# Patient Record
Sex: FEMALE | Race: WHITE | NOT HISPANIC OR LATINO
[De-identification: names, ages, dates, MRNs, and addresses within clinical notes are randomized per-mention and may not be internally consistent; named-entity substitution may affect disease eponyms.]

---

## 2020-01-29 ENCOUNTER — APPOINTMENT (OUTPATIENT)
Dept: HOME HEALTH SERVICES | Facility: HOME HEALTH | Age: 85
End: 2020-01-29
Payer: MEDICARE

## 2020-01-29 VITALS
HEART RATE: 80 BPM | SYSTOLIC BLOOD PRESSURE: 150 MMHG | TEMPERATURE: 99 F | RESPIRATION RATE: 16 BRPM | DIASTOLIC BLOOD PRESSURE: 90 MMHG | OXYGEN SATURATION: 96 %

## 2020-01-29 DIAGNOSIS — Z82.49 FAMILY HISTORY OF ISCHEMIC HEART DISEASE AND OTHER DISEASES OF THE CIRCULATORY SYSTEM: ICD-10-CM

## 2020-01-29 DIAGNOSIS — Z85.3 PERSONAL HISTORY OF MALIGNANT NEOPLASM OF BREAST: ICD-10-CM

## 2020-01-29 DIAGNOSIS — Z80.1 FAMILY HISTORY OF MALIGNANT NEOPLASM OF TRACHEA, BRONCHUS AND LUNG: ICD-10-CM

## 2020-01-29 PROCEDURE — 99497 ADVNCD CARE PLAN 30 MIN: CPT

## 2020-01-29 PROCEDURE — 99343: CPT | Mod: 25

## 2020-01-29 PROCEDURE — G0506: CPT

## 2020-01-31 ENCOUNTER — NON-APPOINTMENT (OUTPATIENT)
Age: 85
End: 2020-01-31

## 2020-02-15 PROBLEM — Z85.3 HISTORY OF BILATERAL MALIGNANT NEOPLASM OF BREAST IN FEMALE, UNSPECIFIED ESTROGEN RECEPTOR STATUS, UNSPECIFIED SITE OF BREAST: Status: RESOLVED | Noted: 2020-02-15 | Resolved: 2020-02-15

## 2020-02-15 PROBLEM — Z82.49 FAMILY HISTORY OF MYOCARDIAL INFARCTION: Status: ACTIVE | Noted: 2020-02-15

## 2020-02-15 PROBLEM — Z80.1 FAMILY HISTORY OF LUNG CANCER: Status: ACTIVE | Noted: 2020-02-15

## 2020-02-15 PROBLEM — Z85.3 HISTORY OF MALIGNANT NEOPLASM OF BREAST: Status: RESOLVED | Noted: 2020-02-15 | Resolved: 2020-02-15

## 2020-02-15 NOTE — COUNSELING
[Completed Medical Orders for Life-Sustaining Treatment] : completed medical orders for life-sustaining treatment [ - New patient with 2 or more chronic conditions; CCM discussed and patient-centered care plan established] : New patient with 2 or more chronic conditions; CCM discussed and patient-centered care plan established [Full Code] : Code Status: Full Code [Limited] : Treatment Guidelines: Limited [Trial of Bipap] : Intubation: Trial of Bipap [[facility] Date: _____] [facility] Completion/last verification date: [unfilled] [_____] : HCP: [unfilled]

## 2020-02-15 NOTE — PHYSICAL EXAM
[No Acute Distress] : no acute distress [Well Nourished] : well nourished [Well Developed] : well developed [Normal Voice/Communication] : normal voice communication [Normal Sclera/Conjunctiva] : normal sclera/conjunctiva [EOMI] : extra ocular movement intact [Normal Outer Ear/Nose] : the ears and nose were normal in appearance [Normal Oropharynx] : the oropharynx was normal [No JVD] : no jugular venous distention [Supple] : the neck was supple [No LAD] : no lymphadenopathy [No Respiratory Distress] : no respiratory distress [Clear to Auscultation] : lungs were clear to auscultation bilaterally [No Accessory Muscle Use] : no accessory muscle use [Normal Rate] : heart rate was normal  [Regular Rhythm] : with a regular rhythm [Normal S1, S2] : normal S1 and S2 [No Murmurs] : no murmurs heard [No Edema] : there was no peripheral edema [Breast Exam Declined] : patient declined to have breast exam done [Normal Bowel Sounds] : normal bowel sounds [Non Tender] : non-tender [Soft] : abdomen soft [Not Distended] : not distended [Normal Post Cervical Nodes] : no posterior cervical lymphadenopathy [Normal Anterior Cervical Nodes] : no anterior cervical lymphadenopathy [No CVA Tenderness] : no ~M costovertebral angle tenderness [No Spinal Tenderness] : no spinal tenderness [No Clubbing, Cyanosis] : no clubbing  or cyanosis of the fingernails [No Rash] : no rash [Cranial Nerves Intact] : cranial nerves 2-12 were intact [No Motor Deficits] : the motor exam was normal [No Gross Sensory Deficits] : no gross sensory deficits [Oriented x3] : oriented to person, place, and time [Normal Affect] : the affect was normal [Normal Mood] : the mood was normal [de-identified] : has significant kyphoscoliosis.

## 2020-02-15 NOTE — REASON FOR VISIT
[Initial Eval - Existing Diagnosis] : an initial evaluation of an existing diagnosis [Spouse] : spouse [Formal Caregiver] : formal caregiver [Pre-Visit Preparation] : pre-visit preparation was done [Intercurrent Specialty/Sub-specialty Visits] : the patient has no intercurrent specialty/sub-specialty visits [FreeTextEntry1] : kyphoscoliosis

## 2020-02-15 NOTE — HISTORY OF PRESENT ILLNESS
[FreeTextEntry1] : kyphoscoliosis [FreeTextEntry2] : Patient is a 83 yo woman with a history of kyphoscoliosis, and history of localized breast cancer s/p mastectomy, who is being seen today as an initial visit to the house calls program.\par \par Patient has a past medical history of breast cancer, localized.  She has a past surgical history of bilateral mastectomy - she had her R breast removed at 40 yo (s/p abnormal mmamogram); Left removed at 75 yo (s/p abnormal mammogram, pt wanted it removed) - pt is unsure if it was with lymph node removal.  \par \par Family history: mother  of mi at 75; dad  of lung ca at 80.\par Social history: never smoked; never drank alcohol, no drugs.\par [employer] [place]  6 yrs.\par 63 years  - anniversary .\par adopted son, but son is not involved.\par \par eats/drinks - fish and canned vegetable soup, turkey, sandwiches.

## 2020-02-15 NOTE — HEALTH RISK ASSESSMENT
[HRA Reviewed] : Health risk assessment reviewed [Independent] : managing finances [Some assistance needed] : housekeeping [Full assistance needed] : doing laundry [No] : The patient does not have visual impairment [No falls in past year] : Patient reported no falls in the past year [TimeGetUpGo] : 25 [de-identified] : has a walker in case she needs it.  knows to use it.

## 2020-03-18 ENCOUNTER — APPOINTMENT (OUTPATIENT)
Dept: HOME HEALTH SERVICES | Facility: HOME HEALTH | Age: 85
End: 2020-03-18

## 2020-04-07 ENCOUNTER — APPOINTMENT (OUTPATIENT)
Dept: HOME HEALTH SERVICES | Facility: HOME HEALTH | Age: 85
End: 2020-04-07

## 2020-06-09 ENCOUNTER — APPOINTMENT (OUTPATIENT)
Dept: HOME HEALTH SERVICES | Facility: HOME HEALTH | Age: 85
End: 2020-06-09

## 2020-06-23 ENCOUNTER — APPOINTMENT (OUTPATIENT)
Dept: HOME HEALTH SERVICES | Facility: HOME HEALTH | Age: 85
End: 2020-06-23
Payer: MEDICARE

## 2020-06-23 ENCOUNTER — APPOINTMENT (OUTPATIENT)
Dept: HOME HEALTH SERVICES | Facility: HOME HEALTH | Age: 85
End: 2020-06-23

## 2020-06-23 VITALS
HEART RATE: 74 BPM | OXYGEN SATURATION: 97 % | SYSTOLIC BLOOD PRESSURE: 146 MMHG | RESPIRATION RATE: 18 BRPM | DIASTOLIC BLOOD PRESSURE: 62 MMHG

## 2020-06-23 PROCEDURE — 99348 HOME/RES VST EST LOW MDM 30: CPT | Mod: 25

## 2020-06-23 PROCEDURE — 99497 ADVNCD CARE PLAN 30 MIN: CPT

## 2020-06-24 NOTE — HISTORY OF PRESENT ILLNESS
[Patient] : patient [FreeTextEntry1] : kyphoscoliosis [FreeTextEntry2] : Patient is a 85 yo woman with a history of kyphoscoliosis, and history of localized breast cancer s/p mastectomy, who is being seen today for follow-up visit.\par \par re: kyphoscoliosis - pt feels like she is bending over more.  Sometimes develops pain, and tylenol works well with the pain.  Her pain from earlier in April has significantly improved.\par \par No SOB, CP, N/V, or abd pain. \par \par eats/drinks - 2 meals a day, 2 drinks a day (she forces herself to drink).  \par U/o - normal in color, but less in frequency due to drinking less.  Urinary incontinence resolved.\par BM still regular. has 1/day or 1 every other day.\par \par no hematochezia/melena.  no dysuria/hematuria.  \par \par Pt has intermittent itchiness @ bedtime, that she scratches a lot.  It is on her arms and her legs.

## 2020-06-24 NOTE — HEALTH RISK ASSESSMENT
[HRA Reviewed] : Health risk assessment reviewed [Independent] : preparing meals [Some assistance needed] : housekeeping [Full assistance needed] : using transportation [No falls in past year] : Patient reported no falls in the past year [No] : The patient does not have visual impairment [TimeGetUpGo] : 25 [de-identified] : has a walker in case she needs it.  knows to use it.

## 2020-06-24 NOTE — REASON FOR VISIT
[Follow-Up] : a follow-up visit [Formal Caregiver] : formal caregiver [Spouse] : spouse [Pre-Visit Preparation] : pre-visit preparation was done [Intercurrent Specialty/Sub-specialty Visits] : the patient has no intercurrent specialty/sub-specialty visits [FreeTextEntry1] : kyphoscoliosis

## 2020-06-24 NOTE — PHYSICAL EXAM
[Well Developed] : well developed [Well Nourished] : well nourished [No Acute Distress] : no acute distress [Normal Voice/Communication] : normal voice communication [Normal Sclera/Conjunctiva] : normal sclera/conjunctiva [EOMI] : extra ocular movement intact [Normal Outer Ear/Nose] : the ears and nose were normal in appearance [No JVD] : no jugular venous distention [No Respiratory Distress] : no respiratory distress [Clear to Auscultation] : lungs were clear to auscultation bilaterally [Supple] : the neck was supple [Regular Rhythm] : with a regular rhythm [No Accessory Muscle Use] : no accessory muscle use [Normal Rate] : heart rate was normal  [No Murmurs] : no murmurs heard [Normal S1, S2] : normal S1 and S2 [No Edema] : there was no peripheral edema [Normal Bowel Sounds] : normal bowel sounds [Breast Exam Declined] : patient declined to have breast exam done [Soft] : abdomen soft [Non Tender] : non-tender [Not Distended] : not distended [No CVA Tenderness] : no ~M costovertebral angle tenderness [No Clubbing, Cyanosis] : no clubbing  or cyanosis of the fingernails [No Spinal Tenderness] : no spinal tenderness [No Gross Sensory Deficits] : no gross sensory deficits [No Motor Deficits] : the motor exam was normal [Cranial Nerves Intact] : cranial nerves 2-12 were intact [Oriented x3] : oriented to person, place, and time [Normal Mood] : the mood was normal [Normal Affect] : the affect was normal [de-identified] : has small red rashes - urticarial in appearance - that are about 5 mm in diameter interspersed on her b/l arms and her b/l thighs. [de-identified] : has significant kyphoscoliosis.

## 2020-06-24 NOTE — COUNSELING
[Completed Medical Orders for Life-Sustaining Treatment] : completed medical orders for life-sustaining treatment [DNR] : Code Status: DNR [Annual Discussion and review of: ___] : Annual discussion and review of [unfilled] [Limited] : Treatment Guidelines: Limited [DNI] : Intubation: DNI [[facility]: _____] [facility] Com[facility]/last verification date: [unfilled]

## 2020-08-04 ENCOUNTER — APPOINTMENT (OUTPATIENT)
Dept: HOME HEALTH SERVICES | Facility: HOME HEALTH | Age: 85
End: 2020-08-04

## 2020-09-15 ENCOUNTER — APPOINTMENT (OUTPATIENT)
Dept: HOME HEALTH SERVICES | Facility: HOME HEALTH | Age: 85
End: 2020-09-15
Payer: MEDICARE

## 2020-09-15 VITALS
OXYGEN SATURATION: 99 % | SYSTOLIC BLOOD PRESSURE: 130 MMHG | DIASTOLIC BLOOD PRESSURE: 86 MMHG | TEMPERATURE: 98.9 F | HEART RATE: 74 BPM | RESPIRATION RATE: 18 BRPM

## 2020-09-15 PROCEDURE — 99349 HOME/RES VST EST MOD MDM 40: CPT

## 2020-09-15 NOTE — COUNSELING
[Annual Discussion and review of: ___] : Annual discussion and review of [unfilled] [Completed Medical Orders for Life-Sustaining Treatment] : completed medical orders for life-sustaining treatment [DNR] : Code Status: DNR [Limited] : Treatment Guidelines: Limited [DNI] : Intubation: DNI [[facility]: _____] [facility]/last verification date: [unfilled]

## 2020-09-22 NOTE — PHYSICAL EXAM
[No Acute Distress] : no acute distress [Well Nourished] : well nourished [Well Developed] : well developed [Normal Voice/Communication] : normal voice communication [Normal Sclera/Conjunctiva] : normal sclera/conjunctiva [EOMI] : extra ocular movement intact [Normal Outer Ear/Nose] : the ears and nose were normal in appearance [No JVD] : no jugular venous distention [Supple] : the neck was supple [No Respiratory Distress] : no respiratory distress [Clear to Auscultation] : lungs were clear to auscultation bilaterally [No Accessory Muscle Use] : no accessory muscle use [Normal Rate] : heart rate was normal  [Regular Rhythm] : with a regular rhythm [Normal S1, S2] : normal S1 and S2 [No Murmurs] : no murmurs heard [No Edema] : there was no peripheral edema [Breast Exam Declined] : patient declined to have breast exam done [Normal Bowel Sounds] : normal bowel sounds [Non Tender] : non-tender [Soft] : abdomen soft [Not Distended] : not distended [No CVA Tenderness] : no ~M costovertebral angle tenderness [No Spinal Tenderness] : no spinal tenderness [No Clubbing, Cyanosis] : no clubbing  or cyanosis of the fingernails [Cranial Nerves Intact] : cranial nerves 2-12 were intact [No Motor Deficits] : the motor exam was normal [No Gross Sensory Deficits] : no gross sensory deficits [Oriented x3] : oriented to person, place, and time [Normal Affect] : the affect was normal [Normal Mood] : the mood was normal [de-identified] : severe kyphoscoliosis [de-identified] : has significant kyphoscoliosis. [de-identified] : near end of exam, pt pointed out a patch of a rash near the acute angle of her scoliosis on her R side which was mild-moderately painful.  The rash is dermatomal in nature on the R side of her back traveling to R side.  Also, b/l LEs show a yellowish plaque like rash on her bilateral shins, not painful.

## 2020-09-22 NOTE — HISTORY OF PRESENT ILLNESS
[Patient] : patient [FreeTextEntry1] : kyphoscoliosis [FreeTextEntry2] : Patient denies fever, cough, trouble breathing, rash, vomiting, diarrhea. Patient has not been in close contact with someone who is COVID positive.\par N95 mask, gloves, eye wear and gown (if indicated) used during visit: Y. \par Total face to face time with patient is 40 min.\par \par Patient is a 84 yo woman with a history of kyphoscoliosis, and history of localized breast cancer s/p mastectomy, who is being seen today for follow-up visit.\par \par re: kyphoscoliosis - pt feels like she is bending over more.  Sometimes develops pain, and tylenol works well with the pain.  Her pain from earlier in April has significantly improved.  (her neck and upper back hurts due to the kyphoscoliosis)\par \par No SOB, CP, N/V, or abd pain. \par \par eats/drinks - 2 meals a day, 2 drinks a day (she forces herself to drink).  eats canned soups\par U/o - normal in color, but less in frequency due to drinking less.  Urinary incontinence resolved.\par BM still regular. has 1/day or 1 every other day.\par \par used to have sinus headaches.  \par vision stable.\par \par no hematochezia/melena.  no dysuria/hematuria.  \par \par has sores on legs - needed to get  for bugs.  rashes on legs itch terribly, but overall getting better.

## 2020-09-22 NOTE — HEALTH RISK ASSESSMENT
[HRA Reviewed] : Health risk assessment reviewed [Independent] : managing finances [Some assistance needed] : housekeeping [Full assistance needed] : using transportation [No falls in past year] : Patient reported no falls in the past year [No] : The patient does not have visual impairment [TimeGetUpGo] : 25 [de-identified] : has a walker in case she needs it.  knows to use it.

## 2020-09-22 NOTE — REASON FOR VISIT
[Follow-Up] : a follow-up visit [Spouse] : spouse [Formal Caregiver] : formal caregiver [Pre-Visit Preparation] : pre-visit preparation was done [Intercurrent Specialty/Sub-specialty Visits] : the patient has no intercurrent specialty/sub-specialty visits [FreeTextEntry1] : kyphoscoliosis

## 2020-09-27 ENCOUNTER — TRANSCRIPTION ENCOUNTER (OUTPATIENT)
Age: 85
End: 2020-09-27

## 2020-10-27 ENCOUNTER — APPOINTMENT (OUTPATIENT)
Dept: HOME HEALTH SERVICES | Facility: HOME HEALTH | Age: 85
End: 2020-10-27

## 2020-12-10 ENCOUNTER — APPOINTMENT (OUTPATIENT)
Dept: HOME HEALTH SERVICES | Facility: HOME HEALTH | Age: 85
End: 2020-12-10

## 2021-01-14 ENCOUNTER — APPOINTMENT (OUTPATIENT)
Dept: HOME HEALTH SERVICES | Facility: HOME HEALTH | Age: 86
End: 2021-01-14

## 2021-01-15 RX ORDER — CAPSAICIN 0.1 G/100G
0.1 CREAM TOPICAL
Qty: 1 | Refills: 3 | Status: DISCONTINUED | COMMUNITY
Start: 2020-09-18 | End: 2021-01-15

## 2021-01-15 RX ORDER — VALACYCLOVIR 1 G/1
1 TABLET, FILM COATED ORAL 3 TIMES DAILY
Qty: 21 | Refills: 0 | Status: DISCONTINUED | COMMUNITY
Start: 2020-09-18 | End: 2021-01-15

## 2021-01-21 ENCOUNTER — NON-APPOINTMENT (OUTPATIENT)
Age: 86
End: 2021-01-21

## 2021-02-25 ENCOUNTER — APPOINTMENT (OUTPATIENT)
Dept: HOME HEALTH SERVICES | Facility: HOME HEALTH | Age: 86
End: 2021-02-25

## 2021-03-18 ENCOUNTER — NON-APPOINTMENT (OUTPATIENT)
Age: 86
End: 2021-03-18

## 2021-03-30 ENCOUNTER — NON-APPOINTMENT (OUTPATIENT)
Age: 86
End: 2021-03-30

## 2021-04-28 ENCOUNTER — NON-APPOINTMENT (OUTPATIENT)
Age: 86
End: 2021-04-28

## 2021-04-29 ENCOUNTER — APPOINTMENT (OUTPATIENT)
Dept: HOME HEALTH SERVICES | Facility: HOME HEALTH | Age: 86
End: 2021-04-29
Payer: MEDICARE

## 2021-04-29 VITALS
SYSTOLIC BLOOD PRESSURE: 162 MMHG | OXYGEN SATURATION: 97 % | TEMPERATURE: 98.6 F | HEART RATE: 75 BPM | RESPIRATION RATE: 18 BRPM | DIASTOLIC BLOOD PRESSURE: 78 MMHG

## 2021-04-29 DIAGNOSIS — Z86.19 PERSONAL HISTORY OF OTHER INFECTIOUS AND PARASITIC DISEASES: ICD-10-CM

## 2021-04-29 PROCEDURE — 99497 ADVNCD CARE PLAN 30 MIN: CPT

## 2021-04-29 PROCEDURE — 99349 HOME/RES VST EST MOD MDM 40: CPT | Mod: 25

## 2021-05-10 ENCOUNTER — APPOINTMENT (OUTPATIENT)
Dept: HOME HEALTH SERVICES | Facility: HOME HEALTH | Age: 86
End: 2021-05-10

## 2021-05-29 ENCOUNTER — TRANSCRIPTION ENCOUNTER (OUTPATIENT)
Age: 86
End: 2021-05-29

## 2021-05-29 ENCOUNTER — NON-APPOINTMENT (OUTPATIENT)
Age: 86
End: 2021-05-29

## 2021-06-01 ENCOUNTER — NON-APPOINTMENT (OUTPATIENT)
Age: 86
End: 2021-06-01

## 2021-06-02 ENCOUNTER — TRANSCRIPTION ENCOUNTER (OUTPATIENT)
Age: 86
End: 2021-06-02

## 2021-06-04 ENCOUNTER — TRANSCRIPTION ENCOUNTER (OUTPATIENT)
Age: 86
End: 2021-06-04

## 2021-06-04 ENCOUNTER — NON-APPOINTMENT (OUTPATIENT)
Age: 86
End: 2021-06-04

## 2021-06-04 ENCOUNTER — APPOINTMENT (OUTPATIENT)
Dept: HOME HEALTH SERVICES | Facility: HOME HEALTH | Age: 86
End: 2021-06-04
Payer: MEDICARE

## 2021-06-04 VITALS
OXYGEN SATURATION: 96 % | HEART RATE: 88 BPM | RESPIRATION RATE: 16 BRPM | DIASTOLIC BLOOD PRESSURE: 72 MMHG | TEMPERATURE: 98.8 F | SYSTOLIC BLOOD PRESSURE: 144 MMHG

## 2021-06-04 PROCEDURE — 99348 HOME/RES VST EST LOW MDM 30: CPT | Mod: 25

## 2021-06-04 PROCEDURE — 69210 REMOVE IMPACTED EAR WAX UNI: CPT

## 2021-06-10 ENCOUNTER — APPOINTMENT (OUTPATIENT)
Dept: HOME HEALTH SERVICES | Facility: HOME HEALTH | Age: 86
End: 2021-06-10

## 2021-06-12 NOTE — COUNSELING
[Advanced Directives discussed: ____] : Advanced directives discussed: [unfilled] [Annual Discussion and review of: ___] : Annual discussion and review of [unfilled] [Full Code] : Code Status: Full Code [Limited] : Treatment Guidelines: Limited [Trial of Bipap] : Intubation: Trial of Bipap [[facility]: _____[facility]/last verification date: [unfilled] [_____] : HCP: [unfilled]

## 2021-06-12 NOTE — HEALTH RISK ASSESSMENT
[HRA Reviewed] : Health risk assessment reviewed [Some assistance needed] : bathing [Independent] : preparing meals [Full assistance needed] : managing finances [No falls in past year] : Patient reported no falls in the past year [Yes] : The patient does have visual impairment [FreeTextEntry1] : has shower chair; can do front, but needs help with back. [TimeGetUpGo] : 15

## 2021-06-12 NOTE — HISTORY OF PRESENT ILLNESS
[Patient] : patient [FreeTextEntry1] : kyphoscoliosis [FreeTextEntry2] : Patient denies fever, cough, trouble breathing, rash, vomiting, diarrhea. Patient has not been in close contact with someone who is COVID positive.\par N95 mask, gloves, eye wear and gown (if indicated) used during visit: Y. \par Total face to face time with patient is 40 min.\par \par Patient is a 87 yo woman with a history of kyphoscoliosis, and history of localized breast cancer s/p mastectomy, who is being seen today for follow-up visit.\par \par re: kyphoscoliosis - pt feels like she is bending over more.  Sometimes develops pain, and tylenol works well with the pain.  (her neck and upper back hurts due to the kyphoscoliosis).  "I want it fixed"  discussed this in detail, but pt doesn't want to leave home and doesn't want to see specialist.  discussed the chronic nature of this.  \par per  - runs to the couch less than before.  "yeah, but now it's getting worse!" which led to disagreements between her and her .\par Pt revisited her recliner: she didn't want recliner in the past (see january) but wanted a new one, but an adjustable one.  Advised that she needs to purchase one on her own.\par \par No SOB, CP, N/V, or abd pain. \par \par eats/drinks - 2 meals a day, 2 drinks a day (she forces herself to drink).  eats canned soups\par U/o - normal in color, but less in frequency due to drinking less.  Urinary incontinence resolved.\par BM still regular. has 1/day or 1 every other day.\par \par vision stable.  glares.  ? cataracts.\par \par sleeps - 7 pm - 7am (about 8 hrs straight).\par \par no hematochezia/melena.  no dysuria/hematuria.  \par \par has sores on legs - needed to get  for bugs.  rashes on legs improved, but intermittently itches - however, overall getting better.  washes, but puts no cream on them.

## 2021-06-14 NOTE — COUNSELING
[Advanced Directives discussed: ____] : Advanced directives discussed: [unfilled] [Annual Discussion and review of: ___] : Annual discussion and review of [unfilled] [Full Code] : Code Status: Full Code [Limited] : Treatment Guidelines: Limited [Trial of Bipap] : Intubation: Trial of Bipap [Last Ver[facility] Date: _____] [facility] Completion/last verification date: [unfilled] [_____] : HCP: [unfilled]

## 2021-06-27 NOTE — HISTORY OF PRESENT ILLNESS
[Patient] : patient [FreeTextEntry1] : kyphoscoliosis [FreeTextEntry2] : Patient is a 85 yo woman with a history of kyphoscoliosis, and history of localized breast cancer s/p mastectomy, who is being seen today for acute visit to clean ears. \par \par Patient states she is having trouble hearing - feels her ears are clogged. She is usually able to hear her  when he is yelling to her on the couch. \par \par No SOB, CP, N/V, or abd pain. \par \par Patient denies fever, cough, trouble breathing, rash, vomiting and diarrhea. Patient has not been in close contact with someone covid positive. \par N95 mask, gloves, eye wear and gown used during visit: Y. Total face to face time with patient is 25min.\par \par

## 2021-06-27 NOTE — PHYSICAL EXAM
[No Acute Distress] : no acute distress [Well Nourished] : well nourished [Well Developed] : well developed [Normal Voice/Communication] : normal voice communication [Normal Sclera/Conjunctiva] : normal sclera/conjunctiva [EOMI] : extra ocular movement intact [Normal Outer Ear/Nose] : the ears and nose were normal in appearance [No JVD] : no jugular venous distention [Supple] : the neck was supple [No Respiratory Distress] : no respiratory distress [Clear to Auscultation] : lungs were clear to auscultation bilaterally [No Accessory Muscle Use] : no accessory muscle use [Normal Rate] : heart rate was normal  [Regular Rhythm] : with a regular rhythm [Normal S1, S2] : normal S1 and S2 [No Murmurs] : no murmurs heard [No Edema] : there was no peripheral edema [Breast Exam Declined] : patient declined to have breast exam done [Normal Bowel Sounds] : normal bowel sounds [Non Tender] : non-tender [Soft] : abdomen soft [Not Distended] : not distended [No CVA Tenderness] : no ~M costovertebral angle tenderness [No Spinal Tenderness] : no spinal tenderness [No Clubbing, Cyanosis] : no clubbing  or cyanosis of the fingernails [Cranial Nerves Intact] : cranial nerves 2-12 were intact [No Motor Deficits] : the motor exam was normal [No Gross Sensory Deficits] : no gross sensory deficits [Oriented x3] : oriented to person, place, and time [Normal Affect] : the affect was normal [Normal Mood] : the mood was normal [de-identified] : severe kyphoscoliosis [de-identified] : bilateral cerumen impaction [de-identified] : has severe kyphoscoliosis. [de-identified] : b/l LEs show a yellowish plaque like rash on her bilateral shins, not painful.  Pt had rashes in her legs before but not at this exam.

## 2021-06-27 NOTE — ASSESSMENT
[FreeTextEntry1] : ACP - pt would want to die at home with hospice.  wants resuscitation and NIPPV attempted but not prolonged.\par even IVF would not be long term just short term fixes.\par if something could be fixed in the hospital, she'd want to go there.  if incurable status or can't be fixed in the hospital, then would want to stay home.

## 2021-07-28 ENCOUNTER — NON-APPOINTMENT (OUTPATIENT)
Age: 86
End: 2021-07-28

## 2021-07-30 ENCOUNTER — APPOINTMENT (OUTPATIENT)
Dept: HOME HEALTH SERVICES | Facility: HOME HEALTH | Age: 86
End: 2021-07-30
Payer: MEDICARE

## 2021-07-30 VITALS
RESPIRATION RATE: 16 BRPM | SYSTOLIC BLOOD PRESSURE: 122 MMHG | TEMPERATURE: 97.4 F | OXYGEN SATURATION: 97 % | DIASTOLIC BLOOD PRESSURE: 82 MMHG | HEART RATE: 70 BPM

## 2021-07-30 PROCEDURE — 99349 HOME/RES VST EST MOD MDM 40: CPT

## 2021-09-03 ENCOUNTER — EMERGENCY (EMERGENCY)
Facility: HOSPITAL | Age: 86
LOS: 1 days | Discharge: HOME | End: 2021-09-03
Attending: EMERGENCY MEDICINE | Admitting: EMERGENCY MEDICINE
Payer: MEDICARE

## 2021-09-03 ENCOUNTER — TRANSCRIPTION ENCOUNTER (OUTPATIENT)
Age: 86
End: 2021-09-03

## 2021-09-03 VITALS
SYSTOLIC BLOOD PRESSURE: 186 MMHG | TEMPERATURE: 98 F | WEIGHT: 100.09 LBS | HEART RATE: 74 BPM | RESPIRATION RATE: 16 BRPM | HEIGHT: 60 IN | OXYGEN SATURATION: 98 % | DIASTOLIC BLOOD PRESSURE: 91 MMHG

## 2021-09-03 VITALS
RESPIRATION RATE: 16 BRPM | SYSTOLIC BLOOD PRESSURE: 174 MMHG | HEART RATE: 80 BPM | OXYGEN SATURATION: 97 % | DIASTOLIC BLOOD PRESSURE: 88 MMHG | TEMPERATURE: 98 F

## 2021-09-03 PROCEDURE — 72125 CT NECK SPINE W/O DYE: CPT | Mod: 26,MH

## 2021-09-03 PROCEDURE — 99284 EMERGENCY DEPT VISIT MOD MDM: CPT

## 2021-09-03 PROCEDURE — 70450 CT HEAD/BRAIN W/O DYE: CPT | Mod: 26,MH

## 2021-09-03 PROCEDURE — 70450 CT HEAD/BRAIN W/O DYE: CPT

## 2021-09-03 PROCEDURE — 99284 EMERGENCY DEPT VISIT MOD MDM: CPT | Mod: 25

## 2021-09-03 PROCEDURE — 72125 CT NECK SPINE W/O DYE: CPT

## 2021-09-03 NOTE — ED ADULT NURSE NOTE - OBJECTIVE STATEMENT
Patient brought in by ambulance from home as reported fell from standing complaining of upper rib pain has history of scoliosis no on blood thinner waiting for MD evaluation. Patient noted to have dried scab to mid sacral area and right lower leg.

## 2021-09-03 NOTE — ED PROVIDER NOTE - CARE PROVIDER_API CALL
Jose Croft  ORTHOPAEDIC SURGERY  [facility], [address] [place] [address]  Phone: [phone]  Fax: [phone]  Follow Up Time:

## 2021-09-03 NOTE — ED PROVIDER NOTE - OBJECTIVE STATEMENT
85 yo white female trip mechanical non syncopal trip and fall short while ago at home here now via EMS for evaluation of mild headache and lower posterior neck pain. No LOC. No weakness. No paraesthesia. No chest/abdominal/back or hip pains. Has been feeling well recently.

## 2021-09-03 NOTE — ED PROVIDER NOTE - NSFOLLOWUPINSTRUCTIONS_ED_ALL_ED_FT
Rest  Take MOTRIN or TYLENOL for pain if needed  Follow-up with your doctor this week  Return here if needed

## 2021-09-03 NOTE — ED CLERICAL - NS ED CLERK NOTE PRE-ARRIVAL INFORMATION; ADDITIONAL PRE-ARRIVAL INFORMATION

## 2021-09-03 NOTE — ED PROVIDER NOTE - CONSTITUTIONAL, MLM
normal... Well appearing, thin elderly white female, awake, alert, oriented to person, place, time/situation and in no apparent distress.

## 2021-09-03 NOTE — ED PROVIDER NOTE - PATIENT PORTAL LINK FT
You can access the FollowMyHealth Patient Portal offered by [facility] by registering at the following website: [email or web address]. By joining [facility]’s FollowMyHealth portal, you will also be able to view your health information using other applications (apps) compatible with our system.

## 2021-09-03 NOTE — ED ADULT NURSE NOTE - MODE OF DISCHARGE
Mother reports that patient has an ear infection but is on an antibiotic, patient has not had a fever in 2 days, notified Dr Rebeca Oliveira and he advised if patient only has ear infection he should be OK for procedure, mother confirmed her understanding.
Ambulatory

## 2021-09-03 NOTE — ED ADULT NURSE REASSESSMENT NOTE - NS ED NURSE REASSESS COMMENT FT1
Received pt alert pt is in stretcher. Pt denies SOB abd pain and chest pain. Call bell within reach. Freq rounding performed. Will continue to monitor. Safety/Comfort maintained. Pt went to CT scan.

## 2021-09-03 NOTE — ED ADULT TRIAGE NOTE - CHIEF COMPLAINT QUOTE
Patient brought in by ambulance from home as reported had mechanical fall from standing complaining of right rib pain on Aspirin

## 2021-09-05 ENCOUNTER — TRANSCRIPTION ENCOUNTER (OUTPATIENT)
Age: 86
End: 2021-09-05

## 2021-09-05 ENCOUNTER — NON-APPOINTMENT (OUTPATIENT)
Age: 86
End: 2021-09-05

## 2021-09-05 ENCOUNTER — APPOINTMENT (OUTPATIENT)
Dept: HOME HEALTH SERVICES | Facility: HOME HEALTH | Age: 86
End: 2021-09-05

## 2021-09-05 ENCOUNTER — APPOINTMENT (OUTPATIENT)
Dept: HOME HEALTH SERVICES | Facility: HOME HEALTH | Age: 86
End: 2021-09-05
Payer: MEDICARE

## 2021-09-05 DIAGNOSIS — R73.9 HYPERGLYCEMIA, UNSPECIFIED: ICD-10-CM

## 2021-09-05 DIAGNOSIS — I49.8 OTHER SPECIFIED CARDIAC ARRHYTHMIAS: ICD-10-CM

## 2021-09-05 PROCEDURE — 99349 HOME/RES VST EST MOD MDM 40: CPT | Mod: 95

## 2021-09-07 ENCOUNTER — FORM ENCOUNTER (OUTPATIENT)
Age: 86
End: 2021-09-07

## 2021-09-07 PROBLEM — I10 ESSENTIAL (PRIMARY) HYPERTENSION: Chronic | Status: ACTIVE | Noted: 2021-09-03

## 2021-09-09 LAB
ALBUMIN SERPL ELPH-MCNC: 3.9 G/DL
ALP BLD-CCNC: 77 U/L
ALT SERPL-CCNC: 11 U/L
ANION GAP SERPL CALC-SCNC: 10 MMOL/L
AST SERPL-CCNC: 17 U/L
BASOPHILS # BLD AUTO: 0.03 K/UL
BASOPHILS NFR BLD AUTO: 0.4 %
BILIRUB SERPL-MCNC: 0.3 MG/DL
BUN SERPL-MCNC: 23 MG/DL
CALCIUM SERPL-MCNC: 9 MG/DL
CHLORIDE SERPL-SCNC: 104 MMOL/L
CO2 SERPL-SCNC: 29 MMOL/L
CREAT SERPL-MCNC: 0.48 MG/DL
EOSINOPHIL # BLD AUTO: 0.2 K/UL
EOSINOPHIL NFR BLD AUTO: 2.5 %
ESTIMATED AVERAGE GLUCOSE: 114 MG/DL
GLUCOSE SERPL-MCNC: 213 MG/DL
HBA1C MFR BLD HPLC: 5.6 %
HCT VFR BLD CALC: 35.5 %
HGB BLD-MCNC: 11.5 G/DL
IMM GRANULOCYTES NFR BLD AUTO: 0.4 %
LYMPHOCYTES # BLD AUTO: 1.12 K/UL
LYMPHOCYTES NFR BLD AUTO: 14.3 %
MAN DIFF?: NORMAL
MCHC RBC-ENTMCNC: 32.4 GM/DL
MCHC RBC-ENTMCNC: 34.6 PG
MCV RBC AUTO: 106.9 FL
MONOCYTES # BLD AUTO: 0.45 K/UL
MONOCYTES NFR BLD AUTO: 5.7 %
NEUTROPHILS # BLD AUTO: 6.02 K/UL
NEUTROPHILS NFR BLD AUTO: 76.7 %
PLATELET # BLD AUTO: 205 K/UL
POTASSIUM SERPL-SCNC: 3.5 MMOL/L
PROT SERPL-MCNC: 6.4 G/DL
RBC # BLD: 3.32 M/UL
RBC # FLD: 12.4 %
SODIUM SERPL-SCNC: 143 MMOL/L
WBC # FLD AUTO: 7.85 K/UL

## 2021-09-10 ENCOUNTER — NON-APPOINTMENT (OUTPATIENT)
Age: 86
End: 2021-09-10

## 2021-09-13 ENCOUNTER — APPOINTMENT (OUTPATIENT)
Dept: HOME HEALTH SERVICES | Facility: HOME HEALTH | Age: 86
End: 2021-09-13

## 2021-09-13 NOTE — HISTORY OF PRESENT ILLNESS
[FreeTextEntry2] : Patient denies fever, cough, trouble breathing, rash, vomiting, diarrhea. Patient has not been in close contact with someone who is COVID positive.\par N95 mask, gloves, eye wear and gown (if indicated) used during visit: Y. \par Total face to face time with patient is 40 min.\par \par Patient is a 87 yo woman with a history of kyphoscoliosis, and history of localized breast cancer s/p mastectomy, who is being seen today for follow-up visit.\par \par scoliosis is still an area of concern.\par feels better except for that.  pain is better, and only intermittent.\par sleeps supine in bed.\par no trouble breathing, no CP.  no insomnia.\par doesn't take any pain kills.\par no falls\par appetite is good \par urinating and BM - goes regularly, no issues.\par \par scoliosis - unsure if getting worse or same.\par before would sleep in couch, now sleeps in bed.\par sometimes gets pain - shoulder and hips.\par \par sleeps - 7 pm - 7am (about 8 hrs straight).\par \par no hematochezia/melena. no dysuria/hematuria. \par \par has sores on legs - needed to get  for bugs. rashes on legs improved, but intermittently itches - however, overall getting better. washes, but puts no cream on them. \par

## 2021-09-17 PROBLEM — R73.9 BLOOD GLUCOSE ELEVATED: Status: ACTIVE | Noted: 2021-09-05

## 2021-09-17 PROBLEM — I49.8 SINUS ARRHYTHMIA: Status: ACTIVE | Noted: 2021-09-05

## 2021-10-01 ENCOUNTER — TRANSCRIPTION ENCOUNTER (OUTPATIENT)
Age: 86
End: 2021-10-01

## 2021-10-01 ENCOUNTER — NON-APPOINTMENT (OUTPATIENT)
Age: 86
End: 2021-10-01

## 2021-10-11 ENCOUNTER — TRANSCRIPTION ENCOUNTER (OUTPATIENT)
Age: 86
End: 2021-10-11

## 2021-10-13 ENCOUNTER — NON-APPOINTMENT (OUTPATIENT)
Age: 86
End: 2021-10-13

## 2021-10-13 DIAGNOSIS — K11.7 DISTURBANCES OF SALIVARY SECRETION: ICD-10-CM

## 2021-10-16 ENCOUNTER — NON-APPOINTMENT (OUTPATIENT)
Age: 86
End: 2021-10-16

## 2021-10-19 ENCOUNTER — NON-APPOINTMENT (OUTPATIENT)
Age: 86
End: 2021-10-19

## 2021-10-20 ENCOUNTER — NON-APPOINTMENT (OUTPATIENT)
Age: 86
End: 2021-10-20

## 2021-10-21 ENCOUNTER — APPOINTMENT (OUTPATIENT)
Dept: HOME HEALTH SERVICES | Facility: HOME HEALTH | Age: 86
End: 2021-10-21
Payer: MEDICARE

## 2021-10-21 VITALS
SYSTOLIC BLOOD PRESSURE: 110 MMHG | TEMPERATURE: 98.4 F | OXYGEN SATURATION: 100 % | DIASTOLIC BLOOD PRESSURE: 70 MMHG | RESPIRATION RATE: 18 BRPM | HEART RATE: 98 BPM

## 2021-10-21 DIAGNOSIS — Z23 ENCOUNTER FOR IMMUNIZATION: ICD-10-CM

## 2021-10-21 DIAGNOSIS — Z20.822 CONTACT WITH AND (SUSPECTED) EXPOSURE TO COVID-19: ICD-10-CM

## 2021-10-21 DIAGNOSIS — M54.9 DORSALGIA, UNSPECIFIED: ICD-10-CM

## 2021-10-21 PROCEDURE — 99349 HOME/RES VST EST MOD MDM 40: CPT

## 2021-11-09 PROBLEM — M54.9 ACUTE BACK PAIN: Status: ACTIVE | Noted: 2021-11-09

## 2021-11-09 PROBLEM — Z20.822 SUSPECTED COVID-19 VIRUS INFECTION: Status: RESOLVED | Noted: 2021-10-13 | Resolved: 2021-11-09

## 2021-11-09 PROBLEM — Z23 NEED FOR INFLUENZA VACCINATION: Status: ACTIVE | Noted: 2020-09-22

## 2021-11-09 NOTE — HEALTH RISK ASSESSMENT
[HRA Reviewed] : Health risk assessment reviewed [Independent] : using telephone [Some assistance needed] : managing medications [Full assistance needed] : managing finances [Any fall with injury in past year] : Patient reported fall with injury in the past year [Yes] : The patient does have visual impairment [TimeGetUpGo] : 20 [de-identified] : needs assistance with walker and holding on to walls.

## 2021-11-09 NOTE — PHYSICAL EXAM
[No Acute Distress] : no acute distress [Well Nourished] : well nourished [Well Developed] : well developed [Normal Voice/Communication] : normal voice communication [Normal Sclera/Conjunctiva] : normal sclera/conjunctiva [EOMI] : extra ocular movement intact [Normal Outer Ear/Nose] : the ears and nose were normal in appearance [No JVD] : no jugular venous distention [Supple] : the neck was supple [No Respiratory Distress] : no respiratory distress [Clear to Auscultation] : lungs were clear to auscultation bilaterally [No Accessory Muscle Use] : no accessory muscle use [Normal Rate] : heart rate was normal  [Regular Rhythm] : with a regular rhythm [Normal S1, S2] : normal S1 and S2 [No Murmurs] : no murmurs heard [No Edema] : there was no peripheral edema [Breast Exam Declined] : patient declined to have breast exam done [Normal Bowel Sounds] : normal bowel sounds [Non Tender] : non-tender [Soft] : abdomen soft [Not Distended] : not distended [No CVA Tenderness] : no ~M costovertebral angle tenderness [No Spinal Tenderness] : no spinal tenderness [No Clubbing, Cyanosis] : no clubbing  or cyanosis of the fingernails [Cranial Nerves Intact] : cranial nerves 2-12 were intact [No Motor Deficits] : the motor exam was normal [No Gross Sensory Deficits] : no gross sensory deficits [Oriented x3] : oriented to person, place, and time [Normal Affect] : the affect was normal [Normal Mood] : the mood was normal [de-identified] : severe kyphoscoliosis [de-identified] : has severe kyphoscoliosis. [de-identified] : has keratotic horn of RLE that's pruritic.

## 2021-11-09 NOTE — HISTORY OF PRESENT ILLNESS
[FreeTextEntry2] : Patient denies fever, cough, trouble breathing, rash, vomiting, diarrhea. Patient has not been in close contact with someone who is COVID positive.\par N95 mask, gloves, eye wear and gown (if indicated) used during visit: Y. \par Total face to face time with patient is 40 min.\par \par Patient is a 85 yo woman with a history of kyphoscoliosis, and history of localized breast cancer s/p mastectomy, who is being seen today for follow-up visit.\par \par pt doesn't feel well.  fell and now has pain in R side.\par was doing well with hospital bed x 2-3 days, but now hurts\par \par scoliosis is still an area of concern.  feels better except for that.  pain is better, and only intermittent.\par sleeps supine in bed.\par no trouble breathing, no CP.  no insomnia.\par doesn't take any pain meds.\par appetite is good \par \par urinating and BM - goes regularly, no issues.\par no hematochezia/melena. no dysuria/hematuria.

## 2021-11-09 NOTE — COMPREHENSIVE ASSESSMENT
[Comprehensive Assessment Reviewed] : Comprehensive assessment reviewed [No Action Needed] : No action needed High Dose Vitamin A Pregnancy And Lactation Text: High dose vitamin A therapy is contraindicated during pregnancy and breast feeding.

## 2021-11-09 NOTE — COUNSELING
[Full Code] : Code Status: Full Code [Limited] : Treatment Guidelines: Limited [Trial of Bipap] : Intubation: Trial of Bipap [Last Verification Date: _____] : [facility] Completion/last verification date: [unfilled]

## 2021-11-29 ENCOUNTER — APPOINTMENT (OUTPATIENT)
Dept: HOME HEALTH SERVICES | Facility: HOME HEALTH | Age: 86
End: 2021-11-29

## 2022-01-18 ENCOUNTER — NON-APPOINTMENT (OUTPATIENT)
Age: 87
End: 2022-01-18

## 2022-01-19 ENCOUNTER — APPOINTMENT (OUTPATIENT)
Dept: HOME HEALTH SERVICES | Facility: HOME HEALTH | Age: 87
End: 2022-01-19
Payer: MEDICARE

## 2022-01-19 VITALS
OXYGEN SATURATION: 97 % | RESPIRATION RATE: 18 BRPM | TEMPERATURE: 98.6 F | DIASTOLIC BLOOD PRESSURE: 70 MMHG | SYSTOLIC BLOOD PRESSURE: 140 MMHG | HEART RATE: 84 BPM

## 2022-01-19 DIAGNOSIS — L60.2 ONYCHOGRYPHOSIS: ICD-10-CM

## 2022-01-19 PROCEDURE — 99349 HOME/RES VST EST MOD MDM 40: CPT

## 2022-02-23 ENCOUNTER — APPOINTMENT (OUTPATIENT)
Dept: HOME HEALTH SERVICES | Facility: HOME HEALTH | Age: 87
End: 2022-02-23

## 2022-03-14 NOTE — ASSESSMENT
-- DO NOT REPLY / DO NOT REPLY ALL --  -- Message is from the [facility]--    General Patient Message      Reason for Call: Patient would like a call back regarding the call she received today.    Caller Information       Type Contact Phone    03/14/2022 03:30 PM CDT Phone (Incoming) Sakina Bacon (Self) [phone] (M)          Alternative phone number: NONE    Turnaround time given to caller:   \"This message will be sent to [state Provider's name]. The clinical team will fulfill your request as soon as they review your message.\"     [FreeTextEntry1] : ACP - pt would want to die at home with hospice.  wants resuscitation and NIPPV attempted but not prolonged.\par even IVF would not be long term just short term fixes.\par if something could be fixed in the hospital, she'd want to go there.  if incurable status or can't be fixed in the hospital, then would want to stay home.

## 2022-03-15 PROBLEM — L60.2 ONYCHOGRYPHOSIS: Status: ACTIVE | Noted: 2022-03-15

## 2022-03-15 NOTE — COUNSELING
[Full Code] : Code Status: Full Code [Limited] : Treatment Guidelines: Limited [Trial of Bipap] : Intubation: Trial of Bipap [[facility] Date: _____] [facility] Completion/last verification date: [unfilled]

## 2022-03-15 NOTE — PHYSICAL EXAM
[No Acute Distress] : no acute distress [Well Nourished] : well nourished [Well Developed] : well developed [Normal Voice/Communication] : normal voice communication [Normal Sclera/Conjunctiva] : normal sclera/conjunctiva [EOMI] : extra ocular movement intact [Normal Outer Ear/Nose] : the ears and nose were normal in appearance [No JVD] : no jugular venous distention [Supple] : the neck was supple [No Respiratory Distress] : no respiratory distress [Clear to Auscultation] : lungs were clear to auscultation bilaterally [No Accessory Muscle Use] : no accessory muscle use [Normal Rate] : heart rate was normal  [Regular Rhythm] : with a regular rhythm [Normal S1, S2] : normal S1 and S2 [No Murmurs] : no murmurs heard [No Edema] : there was no peripheral edema [Breast Exam Declined] : patient declined to have breast exam done [Normal Bowel Sounds] : normal bowel sounds [Non Tender] : non-tender [Soft] : abdomen soft [Not Distended] : not distended [No CVA Tenderness] : no ~M costovertebral angle tenderness [No Spinal Tenderness] : no spinal tenderness [No Clubbing, Cyanosis] : no clubbing  or cyanosis of the fingernails [Cranial Nerves Intact] : cranial nerves 2-12 were intact [No Motor Deficits] : the motor exam was normal [No Gross Sensory Deficits] : no gross sensory deficits [Oriented x3] : oriented to person, place, and time [Normal Mood] : the mood was normal [Normal Affect] : the affect was normal [de-identified] : severe kyphoscoliosis [de-identified] : has severe kyphoscoliosis. [de-identified] : legs - onychogryphosis b/l all toenails; R leg has an open sore on shin.  sore comes and goes.

## 2022-03-15 NOTE — HISTORY OF PRESENT ILLNESS
[FreeTextEntry2] : Patient denies fever, cough, trouble breathing, rash, vomiting, diarrhea. Patient has not been in close contact with someone who is COVID positive.\par N95 mask, gloves, eye wear and gown (if indicated) used during visit: Y. \par Total face to face time with patient is 40 min.\par \par Patient is a 87 yo woman with a history of kyphoscoliosis, and history of localized breast cancer s/p mastectomy, who is being seen today for follow-up visit.\par \par updates: \par same old scoliosis problem. feels better except for that.  pain is better, and only intermittent.\par sleeps supine in bed, and sleeps well.\par sore on R leg\par \par eating and drinking - ok\par \par adls - assistance with toilet (commode) and showering and dressing\par no falls since last visit\par \par R leg has an open sore on shin.  sore comes and goes.  no neosporin in house, so use A+D that they have.\par \par no trouble breathing, no CP.  no insomnia.\par doesn't take any pain meds.\par appetite is good \par \par urinating and BM - goes regularly, no issues.\par no hematochezia/melena. no dysuria/hematuria.

## 2022-03-15 NOTE — HEALTH RISK ASSESSMENT
[HRA Reviewed] : Health risk assessment reviewed [Independent] : using telephone [Some assistance needed] : managing medications [Full assistance needed] : managing finances [Any fall with injury in past year] : Patient reported fall with injury in the past year [Yes] : The patient does have visual impairment [TimeGetUpGo] : 20 [de-identified] : needs assistance with walker and holding on to walls.

## 2022-03-19 ENCOUNTER — NON-APPOINTMENT (OUTPATIENT)
Age: 87
End: 2022-03-19

## 2022-03-19 LAB
ALBUMIN SERPL ELPH-MCNC: 4.3 G/DL
ANION GAP SERPL CALC-SCNC: 11 MMOL/L
BASOPHILS # BLD AUTO: 0.04 K/UL
BASOPHILS NFR BLD AUTO: 0.6 %
BUN SERPL-MCNC: 21 MG/DL
CALCIUM SERPL-MCNC: 9.7 MG/DL
CHLORIDE SERPL-SCNC: 102 MMOL/L
CO2 SERPL-SCNC: 30 MMOL/L
CREAT SERPL-MCNC: 0.5 MG/DL
EGFR: 91 ML/MIN/1.73M2
EOSINOPHIL # BLD AUTO: 0.08 K/UL
EOSINOPHIL NFR BLD AUTO: 1.2 %
ESTIMATED AVERAGE GLUCOSE: 111 MG/DL
GLUCOSE SERPL-MCNC: 101 MG/DL
HBA1C MFR BLD HPLC: 5.5 %
HCT VFR BLD CALC: 41.1 %
HGB BLD-MCNC: 13 G/DL
IMM GRANULOCYTES NFR BLD AUTO: 0.4 %
LYMPHOCYTES # BLD AUTO: 1.09 K/UL
LYMPHOCYTES NFR BLD AUTO: 16 %
MAN DIFF?: NORMAL
MCHC RBC-ENTMCNC: 31.6 GM/DL
MCHC RBC-ENTMCNC: 34.9 PG
MCV RBC AUTO: 110.2 FL
MONOCYTES # BLD AUTO: 0.52 K/UL
MONOCYTES NFR BLD AUTO: 7.6 %
NEUTROPHILS # BLD AUTO: 5.07 K/UL
NEUTROPHILS NFR BLD AUTO: 74.2 %
PHOSPHATE SERPL-MCNC: 3.7 MG/DL
PLATELET # BLD AUTO: 224 K/UL
POTASSIUM SERPL-SCNC: 4.4 MMOL/L
RBC # BLD: 3.73 M/UL
RBC # FLD: 11.7 %
SODIUM SERPL-SCNC: 144 MMOL/L
TSH SERPL-ACNC: 1.01 UIU/ML
WBC # FLD AUTO: 6.83 K/UL

## 2022-05-04 ENCOUNTER — APPOINTMENT (OUTPATIENT)
Dept: HOME HEALTH SERVICES | Facility: HOME HEALTH | Age: 87
End: 2022-05-04
Payer: MEDICARE

## 2022-05-04 VITALS
DIASTOLIC BLOOD PRESSURE: 72 MMHG | HEART RATE: 84 BPM | OXYGEN SATURATION: 98 % | RESPIRATION RATE: 16 BRPM | SYSTOLIC BLOOD PRESSURE: 128 MMHG | TEMPERATURE: 98.2 F

## 2022-05-04 DIAGNOSIS — L28.2 OTHER PRURIGO: ICD-10-CM

## 2022-05-04 PROCEDURE — 99348 HOME/RES VST EST LOW MDM 30: CPT

## 2022-05-05 ENCOUNTER — EMERGENCY (EMERGENCY)
Facility: HOSPITAL | Age: 87
LOS: 1 days | Discharge: HOME | End: 2022-05-05
Attending: EMERGENCY MEDICINE | Admitting: EMERGENCY MEDICINE
Payer: MEDICARE

## 2022-05-05 ENCOUNTER — TRANSCRIPTION ENCOUNTER (OUTPATIENT)
Age: 87
End: 2022-05-05

## 2022-05-05 VITALS
TEMPERATURE: 97 F | RESPIRATION RATE: 16 BRPM | HEIGHT: 60 IN | HEART RATE: 72 BPM | DIASTOLIC BLOOD PRESSURE: 88 MMHG | SYSTOLIC BLOOD PRESSURE: 160 MMHG | WEIGHT: 110.01 LBS | OXYGEN SATURATION: 97 %

## 2022-05-05 VITALS
OXYGEN SATURATION: 100 % | DIASTOLIC BLOOD PRESSURE: 80 MMHG | HEART RATE: 80 BPM | TEMPERATURE: 98 F | SYSTOLIC BLOOD PRESSURE: 138 MMHG | RESPIRATION RATE: 18 BRPM

## 2022-05-05 LAB
ALBUMIN SERPL ELPH-MCNC: 3.6 G/DL — SIGNIFICANT CHANGE UP (ref 3.3–5)
ALP SERPL-CCNC: 82 U/L — SIGNIFICANT CHANGE UP (ref 40–120)
ALT FLD-CCNC: 20 U/L — SIGNIFICANT CHANGE UP (ref 12–78)
ANION GAP SERPL CALC-SCNC: 6 MMOL/L — SIGNIFICANT CHANGE UP (ref 5–17)
APPEARANCE UR: CLEAR — SIGNIFICANT CHANGE UP
AST SERPL-CCNC: 18 U/L — SIGNIFICANT CHANGE UP (ref 15–37)
BASOPHILS # BLD AUTO: 0.05 K/UL — SIGNIFICANT CHANGE UP (ref 0–0.2)
BASOPHILS NFR BLD AUTO: 0.3 % — SIGNIFICANT CHANGE UP (ref 0–2)
BILIRUB SERPL-MCNC: 0.5 MG/DL — SIGNIFICANT CHANGE UP (ref 0.2–1.2)
BILIRUB UR-MCNC: NEGATIVE — SIGNIFICANT CHANGE UP
BUN SERPL-MCNC: 18 MG/DL — SIGNIFICANT CHANGE UP (ref 7–23)
CALCIUM SERPL-MCNC: 9.4 MG/DL — SIGNIFICANT CHANGE UP (ref 8.5–10.1)
CHLORIDE SERPL-SCNC: 105 MMOL/L — SIGNIFICANT CHANGE UP (ref 96–108)
CO2 SERPL-SCNC: 31 MMOL/L — SIGNIFICANT CHANGE UP (ref 22–31)
COLOR SPEC: SIGNIFICANT CHANGE UP
CREAT SERPL-MCNC: 0.48 MG/DL — LOW (ref 0.5–1.3)
DIFF PNL FLD: ABNORMAL
EGFR: 92 ML/MIN/1.73M2 — SIGNIFICANT CHANGE UP
EOSINOPHIL # BLD AUTO: 0.01 K/UL — SIGNIFICANT CHANGE UP (ref 0–0.5)
EOSINOPHIL NFR BLD AUTO: 0.1 % — SIGNIFICANT CHANGE UP (ref 0–6)
GLUCOSE SERPL-MCNC: 147 MG/DL — HIGH (ref 70–99)
GLUCOSE UR QL: NEGATIVE — SIGNIFICANT CHANGE UP
HCT VFR BLD CALC: 40.9 % — SIGNIFICANT CHANGE UP (ref 34.5–45)
HGB BLD-MCNC: 13.9 G/DL — SIGNIFICANT CHANGE UP (ref 11.5–15.5)
IMM GRANULOCYTES NFR BLD AUTO: 0.4 % — SIGNIFICANT CHANGE UP (ref 0–1.5)
KETONES UR-MCNC: ABNORMAL
LEUKOCYTE ESTERASE UR-ACNC: NEGATIVE — SIGNIFICANT CHANGE UP
LYMPHOCYTES # BLD AUTO: 0.6 K/UL — LOW (ref 1–3.3)
LYMPHOCYTES # BLD AUTO: 3.8 % — LOW (ref 13–44)
MCHC RBC-ENTMCNC: 34 GM/DL — SIGNIFICANT CHANGE UP (ref 32–36)
MCHC RBC-ENTMCNC: 34.8 PG — HIGH (ref 27–34)
MCV RBC AUTO: 102.3 FL — HIGH (ref 80–100)
MONOCYTES # BLD AUTO: 0.53 K/UL — SIGNIFICANT CHANGE UP (ref 0–0.9)
MONOCYTES NFR BLD AUTO: 3.3 % — SIGNIFICANT CHANGE UP (ref 2–14)
NEUTROPHILS # BLD AUTO: 14.61 K/UL — HIGH (ref 1.8–7.4)
NEUTROPHILS NFR BLD AUTO: 92.1 % — HIGH (ref 43–77)
NITRITE UR-MCNC: NEGATIVE — SIGNIFICANT CHANGE UP
NRBC # BLD: 0 /100 WBCS — SIGNIFICANT CHANGE UP (ref 0–0)
PH UR: 6.5 — SIGNIFICANT CHANGE UP (ref 5–8)
PLATELET # BLD AUTO: 258 K/UL — SIGNIFICANT CHANGE UP (ref 150–400)
POTASSIUM SERPL-MCNC: 3.6 MMOL/L — SIGNIFICANT CHANGE UP (ref 3.5–5.3)
POTASSIUM SERPL-SCNC: 3.6 MMOL/L — SIGNIFICANT CHANGE UP (ref 3.5–5.3)
PROT SERPL-MCNC: 7.4 G/DL — SIGNIFICANT CHANGE UP (ref 6–8.3)
PROT UR-MCNC: 15
RBC # BLD: 4 M/UL — SIGNIFICANT CHANGE UP (ref 3.8–5.2)
RBC # FLD: 11.1 % — SIGNIFICANT CHANGE UP (ref 10.3–14.5)
SODIUM SERPL-SCNC: 142 MMOL/L — SIGNIFICANT CHANGE UP (ref 135–145)
SP GR SPEC: 1.01 — SIGNIFICANT CHANGE UP (ref 1.01–1.02)
UROBILINOGEN FLD QL: NEGATIVE — SIGNIFICANT CHANGE UP
WBC # BLD: 15.87 K/UL — HIGH (ref 3.8–10.5)
WBC # FLD AUTO: 15.87 K/UL — HIGH (ref 3.8–10.5)

## 2022-05-05 PROCEDURE — 70486 CT MAXILLOFACIAL W/O DYE: CPT | Mod: MA

## 2022-05-05 PROCEDURE — 70450 CT HEAD/BRAIN W/O DYE: CPT | Mod: 26,MA

## 2022-05-05 PROCEDURE — 80053 COMPREHEN METABOLIC PANEL: CPT

## 2022-05-05 PROCEDURE — 99284 EMERGENCY DEPT VISIT MOD MDM: CPT

## 2022-05-05 PROCEDURE — 81001 URINALYSIS AUTO W/SCOPE: CPT

## 2022-05-05 PROCEDURE — 87086 URINE CULTURE/COLONY COUNT: CPT

## 2022-05-05 PROCEDURE — 97162 PT EVAL MOD COMPLEX 30 MIN: CPT

## 2022-05-05 PROCEDURE — 70450 CT HEAD/BRAIN W/O DYE: CPT | Mod: MA

## 2022-05-05 PROCEDURE — 70486 CT MAXILLOFACIAL W/O DYE: CPT | Mod: 26,MA

## 2022-05-05 PROCEDURE — 85025 COMPLETE CBC W/AUTO DIFF WBC: CPT

## 2022-05-05 PROCEDURE — 36415 COLL VENOUS BLD VENIPUNCTURE: CPT

## 2022-05-05 PROCEDURE — 93010 ELECTROCARDIOGRAM REPORT: CPT

## 2022-05-05 PROCEDURE — 72125 CT NECK SPINE W/O DYE: CPT | Mod: MA

## 2022-05-05 PROCEDURE — 72125 CT NECK SPINE W/O DYE: CPT | Mod: 26,MA

## 2022-05-05 PROCEDURE — 99285 EMERGENCY DEPT VISIT HI MDM: CPT | Mod: 25

## 2022-05-05 PROCEDURE — 93005 ELECTROCARDIOGRAM TRACING: CPT

## 2022-05-05 RX ORDER — SODIUM CHLORIDE 9 MG/ML
1000 INJECTION INTRAMUSCULAR; INTRAVENOUS; SUBCUTANEOUS ONCE
Refills: 0 | Status: COMPLETED | OUTPATIENT
Start: 2022-05-05 | End: 2022-05-05

## 2022-05-05 RX ADMIN — SODIUM CHLORIDE 1000 MILLILITER(S): 9 INJECTION INTRAMUSCULAR; INTRAVENOUS; SUBCUTANEOUS at 08:13

## 2022-05-05 NOTE — ED PROVIDER NOTE - NSFOLLOWUPINSTRUCTIONS_ED_ALL_ED_FT
-- Return to the ER for worsening or persistent symptoms, and/or ANY NEW OR CONCERNING SYMPTOMS.    -- If you have difficulty following up, please call: [phone] to obtain a [facility] doctor or specialist who takes your insurance in your area.

## 2022-05-05 NOTE — ED CLERICAL - NS ED CLERK NOTE PRE-ARRIVAL INFORMATION; ADDITIONAL PRE-ARRIVAL INFORMATION

## 2022-05-05 NOTE — ED ADULT TRIAGE NOTE - CHIEF COMPLAINT QUOTE
witnessed fall out of bed, head injury. NO LOC. NO blood thinners. left shoulder and side pain witnessed fall out of bed, head injury. NO LOC. NO blood thinners. left shoulder and side pain + dizziness and nausea

## 2022-05-05 NOTE — ED ADULT NURSE REASSESSMENT NOTE - NS ED NURSE REASSESS COMMENT FT1
Pt received in bed alert and oriented in report JAROD Rinaldi. Pt s/p fall this am and c/o left side face swelling and pain. Pt also has left rib bruising and tenderness. Pt s/p testing and awaiting re-eval. Nursing care ongoing and safety maintained.

## 2022-05-05 NOTE — PHYSICAL THERAPY INITIAL EVALUATION ADULT - ADDITIONAL COMMENTS
Patient reports that she lives with her  in a private house with no MICHAEL, bed/bath on main level. Reports having a 24/7 aide that she shares with her . Has assist for all ADLs, typically ambulates without device however has RW available for use at home.

## 2022-05-05 NOTE — ED PROVIDER NOTE - OBJECTIVE STATEMENT
pt states that she fell out of bed this morning and c/o swelling L the L face around the eye.  denies any headaches, not on any blood thinners, denies any neck pain. pt followed by house calls service.  pt reports having some mild dizziness after the fall. no vomiting. usually walks without assistance as per pt.

## 2022-05-05 NOTE — ED PROVIDER NOTE - PHYSICAL EXAMINATION
Gen: alert, NAD  HEENT:  swelling and hematoma around the L eye with eyelids swollen shut, no pain with EOM  CV:  well perfused, rrr   Pulm:  normal RR, I/E ratio and chest excursion  Abd: s/nt/nd  MSK: moving all extremities, some mild bruising to L knee but otherwise FROM without pain.  Neuro:  non-focal  Skin:  visualized areas intact  Psych: AOx3

## 2022-05-05 NOTE — ED ADULT NURSE NOTE - NSSUHOSCREENINGYN_ED_ALL_ED

## 2022-05-05 NOTE — PHYSICAL THERAPY INITIAL EVALUATION ADULT - ACTIVE RANGE OF MOTION EXAMINATION, REHAB EVAL
kyphotic posture noted, +scoliosis/bilateral upper extremity Active ROM was WFL (within functional limits)/bilateral  lower extremity Active ROM was WFL (within functional limits)

## 2022-05-05 NOTE — ED PROVIDER NOTE - PATIENT PORTAL LINK FT
You can access the FollowMyHealth Patient Portal offered by [facility] by registering at the following website: [email or web address]. By joining [employer]’s FollowMyHealth portal, you will also be able to view your health information using other applications (apps) compatible with our system.

## 2022-05-05 NOTE — ED ADULT NURSE NOTE - OBJECTIVE STATEMENT
Pt BIBEMS c/o dizziness and pain to left ribs and face s/p falling out of bed at approx. 3am.  Pt noted to have bruising and swelling around left orbit.  Fall witnessed by private aide.  No LOC, Pt denies taking blood thinners.  Pt c/o nausea and heartburn at this time, dry heaving at times.  Denies any chest pain or SOB.  Will continue to monitor.

## 2022-05-06 PROBLEM — L28.2 PRURITIC RASH: Status: ACTIVE | Noted: 2020-06-24

## 2022-05-06 LAB
CULTURE RESULTS: NO GROWTH — SIGNIFICANT CHANGE UP
SPECIMEN SOURCE: SIGNIFICANT CHANGE UP

## 2022-05-06 NOTE — COUNSELING
[Full Code] : Code Status: Full Code [Limited] : Treatment Guidelines: Limited [Trial of Bipap] : Intubation: Trial of Bipap [Last Verification Date: _____] : [facility] Completion/last verification date: [unfilled] [Sodium restriction 2gm recommended] : sodium restriction 2 gm recommended [Non - Smoker] : non-smoker [Use assistive device to avoid falls] : use assistive device to avoid falls [Remove clutter and unsafe carpeting to avoid falls] : remove clutter and unsafe carpeting to avoid falls

## 2022-05-06 NOTE — PHYSICAL EXAM
[No Acute Distress] : no acute distress [Well Nourished] : well nourished [Well Developed] : well developed [Normal Voice/Communication] : normal voice communication [Normal Sclera/Conjunctiva] : normal sclera/conjunctiva [EOMI] : extra ocular movement intact [Normal Outer Ear/Nose] : the ears and nose were normal in appearance [No JVD] : no jugular venous distention [Supple] : the neck was supple [No Respiratory Distress] : no respiratory distress [Clear to Auscultation] : lungs were clear to auscultation bilaterally [No Accessory Muscle Use] : no accessory muscle use [Normal Rate] : heart rate was normal  [Regular Rhythm] : with a regular rhythm [Normal S1, S2] : normal S1 and S2 [No Murmurs] : no murmurs heard [No Edema] : there was no peripheral edema [Breast Exam Declined] : patient declined to have breast exam done [Normal Bowel Sounds] : normal bowel sounds [Non Tender] : non-tender [Soft] : abdomen soft [Not Distended] : not distended [No CVA Tenderness] : no ~M costovertebral angle tenderness [No Spinal Tenderness] : no spinal tenderness [No Clubbing, Cyanosis] : no clubbing  or cyanosis of the fingernails [Cranial Nerves Intact] : cranial nerves 2-12 were intact [No Motor Deficits] : the motor exam was normal [No Gross Sensory Deficits] : no gross sensory deficits [Oriented x3] : oriented to person, place, and time [Normal Affect] : the affect was normal [Normal Mood] : the mood was normal [de-identified] : severe kyphoscoliosis [de-identified] : has severe kyphoscoliosis. [de-identified] : legs - onychogryphosis b/l all toenails; R leg has an open sore on shin.  sore comes and goes.

## 2022-05-06 NOTE — HEALTH RISK ASSESSMENT
[HRA Reviewed] : Health risk assessment reviewed [Independent] : using telephone [Some assistance needed] : managing medications [Full assistance needed] : managing finances [Any fall with injury in past year] : Patient reported fall with injury in the past year [Yes] : The patient does have visual impairment [TimeGetUpGo] : 20 [de-identified] : needs assistance with walker and holding on to walls.

## 2022-05-06 NOTE — HISTORY OF PRESENT ILLNESS
[Patient] : patient [FreeTextEntry2] : Patient denies fever, cough, trouble breathing, rash, vomiting, diarrhea. Patient has not been in close contact with someone who is COVID positive.\par N95 mask, gloves, eye wear and gown (if indicated) used during visit: Y. \par Total face to face time with patient is 30 min.\par \par Patient is a 86 yo woman with a history of kyphoscoliosis, and history of localized breast cancer s/p mastectomy, who is being seen today for follow-up visit.\par \par updates: \par Patient lives with her  and HHA. Patient's  can be difficult - she is not sleeping well because he is yelling out and waking her up. \par same old scoliosis problem. feels better except for that.  pain is better, and only intermittent.\par sleeps supine in bed, and sleeps well.\par \par \par eating and drinking - ok\par \par adls - assistance with toilet (commode) and showering and dressing\par no falls since last visit\par no trouble breathing, no CP.  no insomnia.\par doesn't take any pain meds.\par appetite is good \par \par urinating and BM - goes regularly, no issues.\par no hematochezia/melena. no dysuria/hematuria.

## 2022-05-17 ENCOUNTER — APPOINTMENT (OUTPATIENT)
Dept: HOME HEALTH SERVICES | Facility: HOME HEALTH | Age: 87
End: 2022-05-17

## 2022-05-17 ENCOUNTER — NON-APPOINTMENT (OUTPATIENT)
Age: 87
End: 2022-05-17

## 2022-05-19 ENCOUNTER — NON-APPOINTMENT (OUTPATIENT)
Age: 87
End: 2022-05-19

## 2022-06-07 ENCOUNTER — APPOINTMENT (OUTPATIENT)
Dept: HOME HEALTH SERVICES | Facility: HOME HEALTH | Age: 87
End: 2022-06-07
Payer: MEDICARE

## 2022-06-07 VITALS
OXYGEN SATURATION: 100 % | DIASTOLIC BLOOD PRESSURE: 60 MMHG | SYSTOLIC BLOOD PRESSURE: 152 MMHG | TEMPERATURE: 97.2 F | RESPIRATION RATE: 14 BRPM | HEART RATE: 62 BPM

## 2022-06-07 DIAGNOSIS — Z87.898 PERSONAL HISTORY OF OTHER SPECIFIED CONDITIONS: ICD-10-CM

## 2022-06-07 PROCEDURE — 99348 HOME/RES VST EST LOW MDM 30: CPT

## 2022-06-07 NOTE — PHYSICAL EXAM
[No Acute Distress] : no acute distress [Well Nourished] : well nourished [Well Developed] : well developed [Normal Voice/Communication] : normal voice communication [Normal Sclera/Conjunctiva] : normal sclera/conjunctiva [EOMI] : extra ocular movement intact [Normal Outer Ear/Nose] : the ears and nose were normal in appearance [No JVD] : no jugular venous distention [Supple] : the neck was supple [No Respiratory Distress] : no respiratory distress [Clear to Auscultation] : lungs were clear to auscultation bilaterally [No Accessory Muscle Use] : no accessory muscle use [Normal Rate] : heart rate was normal  [Regular Rhythm] : with a regular rhythm [Normal S1, S2] : normal S1 and S2 [No Murmurs] : no murmurs heard [No Edema] : there was no peripheral edema [Breast Exam Declined] : patient declined to have breast exam done [Normal Bowel Sounds] : normal bowel sounds [Non Tender] : non-tender [Soft] : abdomen soft [Not Distended] : not distended [No CVA Tenderness] : no ~M costovertebral angle tenderness [No Spinal Tenderness] : no spinal tenderness [No Clubbing, Cyanosis] : no clubbing  or cyanosis of the fingernails [Cranial Nerves Intact] : cranial nerves 2-12 were intact [No Motor Deficits] : the motor exam was normal [No Gross Sensory Deficits] : no gross sensory deficits [Oriented x3] : oriented to person, place, and time [Normal Affect] : the affect was normal [Normal Mood] : the mood was normal [de-identified] : severe kyphoscoliosis [de-identified] : has severe kyphoscoliosis. [de-identified] : legs - onychogryphosis b/l all toenails; R leg has an open sore on shin.  sore comes and goes.

## 2022-06-07 NOTE — HISTORY OF PRESENT ILLNESS
[Patient] : patient [FreeTextEntry1] : scoliosis/kyphoscoliosis  [FreeTextEntry2] : Patient denies fever, cough, trouble breathing, rash, vomiting, diarrhea. Patient has not been in close contact with someone who is COVID positive.\par N95 mask, gloves, eye wear and gown (if indicated) used during visit: Y. \par Total face to face time with patient is 30 min.\par \par Patient is a 88 yo woman with a history of kyphoscoliosis, and history of localized breast cancer s/p mastectomy, who is being seen today for acute visit. \par \par updates: \par Patient lives with her  and HHA. \par same old scoliosis problem. feels better except for that.  pain is better, and only intermittent. Patient is now looking for comfortable chair - there is no comfortable place to sit in the house. When she is tired of walking/standing - she will lay in the bed \par sleeps supine in bed, and sleeps well.\par \par \par eating and drinking - ok. makes her own meals.  Lisa huggins - who helps coordinate the aides, will do the food shopping. \par She did have a fall recently- went to ED, imaging negative. \par \par adls - assistance with toilet (commode) and showering and dressing\par \par no trouble breathing, no CP.  no insomnia.\par doesn't take any pain meds - will take tylenol if needed. \par appetite is good \par \par urinating and BM - goes regularly, no issues.\par no hematochezia/melena. no dysuria/hematuria.

## 2022-06-09 ENCOUNTER — TRANSCRIPTION ENCOUNTER (OUTPATIENT)
Age: 87
End: 2022-06-09

## 2022-06-30 ENCOUNTER — NON-APPOINTMENT (OUTPATIENT)
Age: 87
End: 2022-06-30

## 2022-07-19 ENCOUNTER — NON-APPOINTMENT (OUTPATIENT)
Age: 87
End: 2022-07-19

## 2022-07-21 ENCOUNTER — NON-APPOINTMENT (OUTPATIENT)
Age: 87
End: 2022-07-21

## 2022-07-26 ENCOUNTER — NON-APPOINTMENT (OUTPATIENT)
Age: 87
End: 2022-07-26

## 2022-07-27 ENCOUNTER — NON-APPOINTMENT (OUTPATIENT)
Age: 87
End: 2022-07-27

## 2022-08-05 ENCOUNTER — APPOINTMENT (OUTPATIENT)
Dept: HOME HEALTH SERVICES | Facility: HOME HEALTH | Age: 87
End: 2022-08-05

## 2022-10-10 NOTE — PHYSICAL THERAPY INITIAL EVALUATION ADULT - PHYSICAL ASSIST/NONPHYSICAL ASSIST: SIT/SUPINE, REHAB EVAL
[facility]   HOSPITALIST HISTORY AND PHYSICAL  Date: 10/10/2022   Patient Name: Wood Desai  : 1941  MRN: [id number]  Primary Care Physician:  Lea Fallon MD  Date of admission: 10/9/2022    Subjective   Subjective     Chief Complaint: Fatigue    HPI:    Wood Desai is a 81 y.o. 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  Denies any chest pain, shortness of breath, nausea, vomiting, diarrhea, dysuria.  Patient was drowsy and sleepy during my interview and has to wake him up multiple times.  So history is limited due to that.  I spoke to the nurse taking care of the patient in the ED and as per her he is very awake and talking but when he sleeps is difficult to make make him awake.    In the ED, ABG showed 7.4  on room air, normal troponin, normal ammonia, lactic acid, no significant abnormalities on CMP, slightly elevated white count of 11.54 chest x-ray showed small left pleural effusion and left basilar opacity concerning for atelectasis or pneumonia.  When compared with the previous chest x-ray from 2022 it has improved.  During that time patient has been treated for left lower lobe pneumonia, these findings could be from the previous pneumonia.  CT head did not show any acute abnormalities.      Personal History     Past Medical History:   Diagnosis Date   • Acute cystitis 2018   • BPH (benign prostatic hyperplasia)    • Brain bleed (HCC)    • Broken ankle    • Broken neck (HCC)    • Coronary artery disease involving native heart without angina pectoris 2022   • Gout    • High blood pressure    • Myocardial infarction (HCC)          Past Surgical History:   Procedure Laterality Date   • COLON SURGERY      • CRANIOTOMY     • CYSTOSCOPY     • EXTERNAL EAR SURGERY     • HERNIA REPAIR     • NECK SURGERY     • NOSE SURGERY     • SHOULDER SURGERY     • SKIN CANCER EXCISION     • SPLENECTOMY           Family History   Problem Relation Age of Onset   • Ulcers Mother    • Lung cancer Father          Social History     Socioeconomic History   • Marital status:    Tobacco Use   • Smoking status: Never   • Smokeless tobacco: Never   Substance and Sexual Activity   • Alcohol use: Never         Home Medications:  Metoprolol Tartrate, QUEtiapine, aspirin, finasteride, gabapentin, lactulose, levothyroxine, melatonin, and primidone    Allergies:  No Known Allergies    Review of Systems   Complete review of systems was unable to obtain due to the patient drowsiness    Objective   Objective     Vitals:   Temp:  [97.8 °F (36.6 °C)] 97.8 °F (36.6 °C)  Heart Rate:  [53-72] 72  Resp:  [18] 18  BP: (111-130)/(55-66) 130/55    Physical Exam    Constitutional: Awake, drowsy, lethargic, on nasal cannula   Eyes: Pupils equal, sclerae anicteric, no conjunctival injection   HENT: NCAT, mucous membranes dry   Neck: Supple, no thyromegaly, no lymphadenopathy, trachea midline   Respiratory: Clear to auscultation bilaterally, nonlabored respirations    Cardiovascular: RRR, no murmurs, rubs, or gallops   Gastrointestinal: Positive bowel sounds, soft, nontender, nondistended   Musculoskeletal: No bilateral ankle edema, no clubbing or cyanosis to extremities   Psychiatric: Appropriate affect, cooperative   Neurologic: Unable to evaluate for orientation, patient was able to open his eyes with voice and occasionally with touch.  Drowsy and sleepy during my evaluation, strength    Skin: No rashes     Result Review    Result Review:  I have personally reviewed the results from the time of this admission to 10/10/2022 01:58 EDT and agree with these findings:  [x]  Laboratory  []  Microbiology  [x]  Radiology  [x]  EKG/Telemetry   []   Cardiology/Vascular   []  Pathology  []  Old records  []  Other:    Imaging Results (Last 24 Hours)     Procedure Component Value Units Date/Time    CT Head Without Contrast [[id number]] Collected: 10/09/22 2240     Updated: 10/09/22 2243    Narrative:      PROCEDURE: CT HEAD WO CONTRAST     COMPARISONS: 9/15/2022; 9/14/2022; 2/15/2022.     INDICATIONS: headache, today.     PROTOCOL:   Standard CT imaging protocol performed.      RADIATION:   Total DLP: 1,714.2 mGy*cm.    MA and/or KV were/was adjusted to minimize radiation dose.       TECHNIQUE: After obtaining the patient's consent, 126 CT images were obtained without non-ionic   intravenous contrast material.      FINDINGS:   No acute intracranial hemorrhage is seen.  No acute skull fracture.  No midline shift or acute   intracranial herniation syndrome.  No acute infarct.  The other incidental nonemergent findings are   as described in prior recent CT exam reports.  Please see those reports for further detail.       Impression:         No acute brain abnormality is appreciated.             Please note that portions of this note were completed with a voice recognition program.     ORESTES MALCOLM JR, MD         Electronically Signed and Approved By: ORESTES MALCOLM JR, MD on 10/09/2022 at 22:39                        XR Chest 1 View [[id number]] Collected: 10/09/22 1619     Updated: 10/09/22 1622    Narrative:      PROCEDURE: XR CHEST 1 VW     COMPARISON: [facility], CT, CT CHEST W CONTRAST DIAGNOSTIC, 9/14/2022, 22:12.  [facility], CR, XR CHEST 1 VW, 9/14/2022, 20:17.     INDICATIONS: Weak/Dizzy/AMS triage protocol     FINDINGS:   There is a small left pleural effusion with left basilar opacity.  The heart and mediastinal   contours appear normal.  The pulmonary vasculature appears normal.  Surgical hardware is noted   within the visualized neck.       Impression:         1. Small left pleural effusion.  2. Left basilar opacity, which  could reflect atelectasis or pneumonia.                  ELOISA CHEUNG MD         Electronically Signed and Approved By: ELOISA CHEUNG MD on 10/09/2022 at 16:19                            Assessment & Plan   Assessment / Plan     Assessment/Plan:   Diagnosis  Generalized weakness/fatigue  Dehydration due to poor p.o. intake  Small left pleural effusion  Left lower lobe consolidation, questionable new finding versus previous lesion from pneumonia  BPH  Hypothyroidism  History of TBI  History of constipation    Plan  Continue IV fluids at 100 cc/h for next 12 hours  Check TSH and T4 in the a.m.  Less likely pneumonia and the findings on the chest x-ray could be likely secondary to the previous infection.  Gave 1 dose of IV ceftriaxone, will check Pro-Won if elevated will continue treatment for pneumonia  Aspirin 81 mg daily, levothyroxine, primidone  Close as needed for constipation  N.p.o. sips with meds  SLP eval in the a.m.  PT OT      DVT prophylaxis:  No DVT prophylaxis order currently exists.    CODE STATUS:         Admission Status:  I believe this patient meets observation status.    Chey Salinas MD              verbal cues/1 person assist

## 2022-10-31 ENCOUNTER — APPOINTMENT (OUTPATIENT)
Dept: HOME HEALTH SERVICES | Facility: HOME HEALTH | Age: 87
End: 2022-10-31

## 2022-12-09 ENCOUNTER — NON-APPOINTMENT (OUTPATIENT)
Age: 87
End: 2022-12-09

## 2022-12-14 ENCOUNTER — APPOINTMENT (OUTPATIENT)
Dept: HOME HEALTH SERVICES | Facility: HOME HEALTH | Age: 87
End: 2022-12-14

## 2022-12-14 VITALS
OXYGEN SATURATION: 97 % | RESPIRATION RATE: 14 BRPM | DIASTOLIC BLOOD PRESSURE: 64 MMHG | HEART RATE: 89 BPM | SYSTOLIC BLOOD PRESSURE: 122 MMHG

## 2022-12-14 DIAGNOSIS — R32 UNSPECIFIED URINARY INCONTINENCE: ICD-10-CM

## 2022-12-14 PROCEDURE — 99348 HOME/RES VST EST LOW MDM 30: CPT

## 2022-12-14 NOTE — HISTORY OF PRESENT ILLNESS
[Patient] : patient [FreeTextEntry2] : Patient denies fever, cough, trouble breathing, rash, vomiting, diarrhea. Patient has not been in close contact with someone who is COVID positive.\par N95 mask, gloves, eye wear and gown (if indicated) used during visit: Y. \par Total face to face time with patient is 30 min.\par \par Patient is a 86 yo woman with a history of kyphoscoliosis, and history of localized breast cancer s/p mastectomy, who is being seen today for follow-up visit.\par \par updates: \par patient's  passed this year. \par patient also called regarding her ongoing scoliosis pain. \par \par At today's visit, patient seen sitting at kitchen table. Patient has aides 24HHA - she is never alone. she prepares her own meals -- doesn’t cook, but will make sandwiches Aides help her shower. \par eating and drinking - ok\par \par adls - assistance with toilet (commode) and showering and dressing\par no falls since last visit\par no trouble breathing, no CP.  no insomnia.\par will sometimes take Tylenol for her scoliosis \par appetite is good \par \par urinating and BM - goes regularly, no issues.\par no hematochezia/melena. no dysuria/hematuria.

## 2022-12-14 NOTE — PHYSICAL EXAM
[No Acute Distress] : no acute distress [Well Nourished] : well nourished [Well Developed] : well developed [Normal Voice/Communication] : normal voice communication [Normal Sclera/Conjunctiva] : normal sclera/conjunctiva [EOMI] : extra ocular movement intact [Normal Outer Ear/Nose] : the ears and nose were normal in appearance [No JVD] : no jugular venous distention [Supple] : the neck was supple [No Respiratory Distress] : no respiratory distress [Clear to Auscultation] : lungs were clear to auscultation bilaterally [No Accessory Muscle Use] : no accessory muscle use [Normal Rate] : heart rate was normal  [Regular Rhythm] : with a regular rhythm [Normal S1, S2] : normal S1 and S2 [No Murmurs] : no murmurs heard [No Edema] : there was no peripheral edema [Breast Exam Declined] : patient declined to have breast exam done [Normal Bowel Sounds] : normal bowel sounds [Non Tender] : non-tender [Soft] : abdomen soft [Not Distended] : not distended [No CVA Tenderness] : no ~M costovertebral angle tenderness [No Spinal Tenderness] : no spinal tenderness [No Clubbing, Cyanosis] : no clubbing  or cyanosis of the fingernails [Cranial Nerves Intact] : cranial nerves 2-12 were intact [No Motor Deficits] : the motor exam was normal [No Gross Sensory Deficits] : no gross sensory deficits [Oriented x3] : oriented to person, place, and time [Normal Affect] : the affect was normal [Normal Mood] : the mood was normal [de-identified] : severe kyphoscoliosis [de-identified] : has severe kyphoscoliosis. [de-identified] : legs - onychogryphosis b/l all toenails; R leg has an open sore on shin.  sore comes and goes.

## 2022-12-14 NOTE — HEALTH RISK ASSESSMENT
[HRA Reviewed] : Health risk assessment reviewed [Independent] : using telephone [Some assistance needed] : managing medications [Full assistance needed] : managing finances [Any fall with injury in past year] : Patient reported fall with injury in the past year [Yes] : The patient does have visual impairment [TimeGetUpGo] : 20 [de-identified] : needs assistance with walker and holding on to walls.

## 2022-12-14 NOTE — COUNSELING
[Sodium restriction 2gm recommended] : sodium restriction 2 gm recommended [Non - Smoker] : non-smoker [Use assistive device to avoid falls] : use assistive device to avoid falls [Remove clutter and unsafe carpeting to avoid falls] : remove clutter and unsafe carpeting to avoid falls [Limited] : Treatment Guidelines: Limited [[facility] Ver[facility] Date: _____] [facility] [place] Completion/last verification date: [unfilled] [Decrease hospital use] : decrease hospital use [Minimize unnecessary interventions] : minimize unnecessary interventions [Completed DNR] : completed DNR [Completed Medical Orders for Life-Sustaining Treatment] : completed medical orders for life-sustaining treatment [DNR] : Code Status: DNR [DNI] : Intubation: DNI

## 2023-03-07 ENCOUNTER — APPOINTMENT (OUTPATIENT)
Dept: HOME HEALTH SERVICES | Facility: HOME HEALTH | Age: 88
End: 2023-03-07
Payer: MEDICARE

## 2023-03-07 VITALS
DIASTOLIC BLOOD PRESSURE: 70 MMHG | TEMPERATURE: 98.78 F | OXYGEN SATURATION: 98 % | HEART RATE: 88 BPM | SYSTOLIC BLOOD PRESSURE: 118 MMHG | RESPIRATION RATE: 17 BRPM

## 2023-03-07 DIAGNOSIS — H91.90 UNSPECIFIED HEARING LOSS, UNSPECIFIED EAR: ICD-10-CM

## 2023-03-07 PROCEDURE — 99348 HOME/RES VST EST LOW MDM 30: CPT

## 2023-03-07 NOTE — HEALTH RISK ASSESSMENT
[HRA Reviewed] : Health risk assessment reviewed [Independent] : using telephone [Some assistance needed] : managing medications [Full assistance needed] : managing finances [Any fall with injury in past year] : Patient reported fall with injury in the past year [Yes] : The patient does have visual impairment [TimeGetUpGo] : 20 [de-identified] : needs assistance with walker and holding on to walls.

## 2023-03-07 NOTE — PHYSICAL EXAM
[No Acute Distress] : no acute distress [Well Nourished] : well nourished [Well Developed] : well developed [Normal Voice/Communication] : normal voice communication [Normal Sclera/Conjunctiva] : normal sclera/conjunctiva [EOMI] : extra ocular movement intact [Normal Outer Ear/Nose] : the ears and nose were normal in appearance [No JVD] : no jugular venous distention [Supple] : the neck was supple [No Respiratory Distress] : no respiratory distress [Clear to Auscultation] : lungs were clear to auscultation bilaterally [No Accessory Muscle Use] : no accessory muscle use [Normal Rate] : heart rate was normal  [Regular Rhythm] : with a regular rhythm [Normal S1, S2] : normal S1 and S2 [No Murmurs] : no murmurs heard [No Edema] : there was no peripheral edema [Breast Exam Declined] : patient declined to have breast exam done [Normal Bowel Sounds] : normal bowel sounds [Non Tender] : non-tender [Soft] : abdomen soft [Not Distended] : not distended [No CVA Tenderness] : no ~M costovertebral angle tenderness [No Spinal Tenderness] : no spinal tenderness [No Clubbing, Cyanosis] : no clubbing  or cyanosis of the fingernails [Cranial Nerves Intact] : cranial nerves 2-12 were intact [No Motor Deficits] : the motor exam was normal [No Gross Sensory Deficits] : no gross sensory deficits [Oriented x3] : oriented to person, place, and time [Normal Affect] : the affect was normal [Normal Mood] : the mood was normal [de-identified] : severe kyphoscoliosis [de-identified] : has severe kyphoscoliosis. [de-identified] : legs - onychogryphosis b/l all toenails; R leg has an open sore on shin.  sore comes and goes.

## 2023-03-07 NOTE — COUNSELING
[Sodium restriction 2gm recommended] : sodium restriction 2 gm recommended [Non - Smoker] : non-smoker [Use assistive device to avoid falls] : use assistive device to avoid falls [Remove clutter and unsafe carpeting to avoid falls] : remove clutter and unsafe carpeting to avoid falls [Decrease hospital use] : decrease hospital use [Minimize unnecessary interventions] : minimize unnecessary interventions [Completed DNR] : completed DNR [Completed Medical Orders for Life-Sustaining Treatment] : completed medical orders for life-sustaining treatment [DNR] : Code Status: DNR [Limited] : Treatment Guidelines: Limited [DNI] : Intubation: DNI [facility]: [facility]/last verification date: [unfilled]

## 2023-03-07 NOTE — REVIEW OF SYSTEMS
[Negative] : Heme/Lymph [Hearing Loss] : hearing loss [Joint Stiffness] : joint stiffness [Back Pain] : back pain

## 2023-03-15 ENCOUNTER — TRANSCRIPTION ENCOUNTER (OUTPATIENT)
Age: 88
End: 2023-03-15

## 2023-03-20 ENCOUNTER — APPOINTMENT (OUTPATIENT)
Dept: HOME HEALTH SERVICES | Facility: HOME HEALTH | Age: 88
End: 2023-03-20
Payer: MEDICARE

## 2023-03-20 VITALS
RESPIRATION RATE: 16 BRPM | HEART RATE: 84 BPM | OXYGEN SATURATION: 98 % | SYSTOLIC BLOOD PRESSURE: 126 MMHG | TEMPERATURE: 97.16 F | DIASTOLIC BLOOD PRESSURE: 72 MMHG

## 2023-03-20 PROCEDURE — 69210 REMOVE IMPACTED EAR WAX UNI: CPT

## 2023-03-20 PROCEDURE — 99348 HOME/RES VST EST LOW MDM 30: CPT | Mod: 25

## 2023-03-20 NOTE — REVIEW OF SYSTEMS
[Hearing Loss] : hearing loss [Joint Stiffness] : joint stiffness [Back Pain] : back pain [Negative] : Heme/Lymph

## 2023-04-17 ENCOUNTER — APPOINTMENT (OUTPATIENT)
Dept: HOME HEALTH SERVICES | Facility: HOME HEALTH | Age: 88
End: 2023-04-17

## 2023-04-18 NOTE — PHYSICAL EXAM
[No Acute Distress] : no acute distress [Well Nourished] : well nourished [Well Developed] : well developed [Normal Voice/Communication] : normal voice communication [Normal Sclera/Conjunctiva] : normal sclera/conjunctiva [EOMI] : extra ocular movement intact [Normal Outer Ear/Nose] : the ears and nose were normal in appearance [No JVD] : no jugular venous distention [Supple] : the neck was supple [No Respiratory Distress] : no respiratory distress [Clear to Auscultation] : lungs were clear to auscultation bilaterally [No Accessory Muscle Use] : no accessory muscle use [Normal Rate] : heart rate was normal  [Regular Rhythm] : with a regular rhythm [Normal S1, S2] : normal S1 and S2 [No Murmurs] : no murmurs heard [No Edema] : there was no peripheral edema [Breast Exam Declined] : patient declined to have breast exam done [Normal Bowel Sounds] : normal bowel sounds [Non Tender] : non-tender [Soft] : abdomen soft [Not Distended] : not distended [No CVA Tenderness] : no ~M costovertebral angle tenderness [No Spinal Tenderness] : no spinal tenderness [No Clubbing, Cyanosis] : no clubbing  or cyanosis of the fingernails [Cranial Nerves Intact] : cranial nerves 2-12 were intact [No Motor Deficits] : the motor exam was normal [No Gross Sensory Deficits] : no gross sensory deficits [Oriented x3] : oriented to person, place, and time [Normal Affect] : the affect was normal [Normal Mood] : the mood was normal [de-identified] : severe kyphoscoliosis [de-identified] : has severe kyphoscoliosis. [de-identified] : legs - onychogryphosis b/l all toenails; R leg has an open sore on shin.  sore comes and goes.

## 2023-04-18 NOTE — HISTORY OF PRESENT ILLNESS
[Patient] : patient [FreeTextEntry1] : scoliosis [FreeTextEntry2] : \par Patient is a 87 yo woman with a history of kyphoscoliosis, and history of localized breast cancer s/p mastectomy, who is being seen today for acute visit to clean her ears. \par \par \par At today's visit, patient seen sitting at kitchen table. Patient has aides [employer] - she is never alone. she prepares her own meals -- doesn’t cook, but will make sandwiches Aides help her shower. \par eating and drinking - ok\par \par Patient does not have comfortable chair to relax in the home - just has hard chairs.. advised to but recliner. patient agrees she needs comfortable chair to sit/relax in. \par \par adls - assistance with toilet (commode) and showering and dressing\par no falls since last visit\par no trouble breathing, no CP.  no insomnia.\par will sometimes take Tylenol for her scoliosis \par appetite is good \par \par urinating and BM - goes regularly, no issues.\par no hematochezia/melena. no dysuria/hematuria.

## 2023-04-18 NOTE — ADDENDUM
[FreeTextEntry1] :  A Geriatric Chair is needed in the home as patient needs a more substantial seating platform than is provided by a conventional wheelchair. Wheelchair is no longer adequate and is ruled out completely as patient condition has deteriorated. She is homebound due to vascular dementia  and dependent on all ADL’s. Pt has caregiver willing and able to assist with the geriatric chair . Pt. home provides adequate access for the use of the chair in the home. Will order Radha chair\par \par

## 2023-05-17 ENCOUNTER — APPOINTMENT (OUTPATIENT)
Dept: HOME HEALTH SERVICES | Facility: HOME HEALTH | Age: 88
End: 2023-05-17

## 2023-05-18 VITALS
TEMPERATURE: 206.6 F | OXYGEN SATURATION: 98 % | DIASTOLIC BLOOD PRESSURE: 60 MMHG | SYSTOLIC BLOOD PRESSURE: 120 MMHG | HEART RATE: 86 BPM

## 2023-05-23 ENCOUNTER — NON-APPOINTMENT (OUTPATIENT)
Age: 88
End: 2023-05-23

## 2023-05-24 ENCOUNTER — APPOINTMENT (OUTPATIENT)
Dept: HOME HEALTH SERVICES | Facility: HOME HEALTH | Age: 88
End: 2023-05-24
Payer: MEDICARE

## 2023-05-24 VITALS
DIASTOLIC BLOOD PRESSURE: 60 MMHG | HEART RATE: 81 BPM | OXYGEN SATURATION: 94 % | SYSTOLIC BLOOD PRESSURE: 120 MMHG | RESPIRATION RATE: 16 BRPM | TEMPERATURE: 98.24 F
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
## 2023-05-24 PROCEDURE — 99495 TRANSJ CARE MGMT MOD F2F 14D: CPT

## 2023-05-24 NOTE — HISTORY OF PRESENT ILLNESS
[Patient] : patient [FreeTextEntry1] : scoliosis [FreeTextEntry2] : Patient is a 87 yo woman with a history of kyphoscoliosis, and history of localized breast cancer s/p mastectomy, who is being seen today for follow-up visit/post hospital discharge. \par \par \par KAM FUENTES is being seen after discharge home from [facility]. She  was admitted on 05/10/2023 for fall and discharged home on 05/16/23. \par Post-discharge contact was made within 2 days of discharge home.\par Discharge medications were reviewed and reconciled with the current medication list and medications in home\par \par Hospital course: Pt arrived late 5/10 s/p falling out of bed and was found to head a right forehead lac and right thigh hematoma. She was admitted to [facility] and downgraded the same day to the \[facility]". She tolerated diet and had herpain managed. As she was preparing to leave 5/12, he hg dropped and HR went up,so we got a repeat CT pelvis w/ contrast to r/o extravasation. Her read was stable and we continued to monitor over the weekend. On 5/15, she started vomiting all PO intake and was monitored overnight. On 5/16, she was tolerating\par food, doing well, had pain well managed, and was ready to go home with PT. She was given appropriate follow up and meds sent to pharmacy.\par \par At today's visit, patient seen sitting at kitchen table in her wheelchair. Patient has some bruising s/p fall out of the bed, overall feels okay - "stiff" She is getting OOB with assistance and walking with walker - mostly, she sits on the wheelchair. \par She is eating - feeds herself. eating well \par GI/: she is urinating. she was constipated in the hospital - but, did have large BM. Was given enema yesterday, small result. \par \par adls - assistance with toilet (commode) and showering and dressing\par no trouble breathing, no CP.  no insomnia.\par appetite is good \par \par no hematochezia/melena. no dysuria/hematuria.

## 2023-05-24 NOTE — HEALTH RISK ASSESSMENT
[HRA Reviewed] : Health risk assessment reviewed [Independent] : using telephone [Some assistance needed] : managing medications [Full assistance needed] : managing finances [Any fall with injury in past year] : Patient reported fall with injury in the past year [Yes] : The patient does have visual impairment [TimeGetUpGo] : 20 [de-identified] : needs assistance with walker and holding on to walls.

## 2023-05-24 NOTE — COUNSELING
[Sodium restriction 2gm recommended] : sodium restriction 2 gm recommended [Non - Smoker] : non-smoker [Use assistive device to avoid falls] : use assistive device to avoid falls [Remove clutter and unsafe carpeting to avoid falls] : remove clutter and unsafe carpeting to avoid falls [Decrease hospital use] : decrease hospital use [Minimize unnecessary interventions] : minimize unnecessary interventions [Completed DNR] : completed DNR [Completed Medical Orders for Life-Sustaining Treatment] : completed medical orders for life-sustaining treatment [DNR] : Code Status: DNR [Limited] : Treatment Guidelines: Limited [DNI] : Intubation: DNI [[facility]: [facility]/last verification date: [unfilled]

## 2023-05-24 NOTE — PHYSICAL EXAM
[No Acute Distress] : no acute distress [Well Nourished] : well nourished [Well Developed] : well developed [Normal Voice/Communication] : normal voice communication [Normal Sclera/Conjunctiva] : normal sclera/conjunctiva [EOMI] : extra ocular movement intact [Normal Outer Ear/Nose] : the ears and nose were normal in appearance [No JVD] : no jugular venous distention [Supple] : the neck was supple [No Respiratory Distress] : no respiratory distress [Clear to Auscultation] : lungs were clear to auscultation bilaterally [No Accessory Muscle Use] : no accessory muscle use [Normal Rate] : heart rate was normal  [Regular Rhythm] : with a regular rhythm [Normal S1, S2] : normal S1 and S2 [No Murmurs] : no murmurs heard [No Edema] : there was no peripheral edema [Normal Bowel Sounds] : normal bowel sounds [Breast Exam Declined] : patient declined to have breast exam done [Non Tender] : non-tender [Soft] : abdomen soft [Not Distended] : not distended [No CVA Tenderness] : no ~M costovertebral angle tenderness [No Spinal Tenderness] : no spinal tenderness [No Clubbing, Cyanosis] : no clubbing  or cyanosis of the fingernails [Cranial Nerves Intact] : cranial nerves 2-12 were intact [No Motor Deficits] : the motor exam was normal [No Gross Sensory Deficits] : no gross sensory deficits [Oriented x3] : oriented to person, place, and time [Normal Affect] : the affect was normal [Normal Mood] : the mood was normal [de-identified] : severe kyphoscoliosis [de-identified] : has severe kyphoscoliosis. [de-identified] : legs - onychogryphosis b/l all toenails; R leg has an open sore on shin.  sore comes and goes.

## 2023-05-24 NOTE — ADDENDUM
[FreeTextEntry1] : Missy Morales requires positioning of the body to alleviate pain and pressure in ways not feasible in an ordinary bed. Missy Morales requires frequent changes in body position due to limited mobility wuth scoliosis/kyphoscoliosis.  Missy Morales also requires the head of the bed to be elevated more than 30 degrees due to hiatal hernia and avoid problems with aspiration. Will order patient a semi-electric hospital bed. \par \par Missy Morales requires the use of a standard wheelchair for her/his mobility. Missy Morales has the diagnosis of scoliosis/kyphoscoliosis which severely limits their ability to ambulate and thus a wheelchair will greatly benefit the patient and assist them with activities of daily living in their home such as toileting, dressing, bathing and grooming. A cane or walker has been ruled out. Missy Morales has not expressed an unwillingness to use the wheelchair in their home, which provides adequate space for maneuvering the mobility device. She have sufficient upper extremity strength and the mental capacity to safely propel a standard wheelchair.\par \par \par \par

## 2023-05-31 ENCOUNTER — NON-APPOINTMENT (OUTPATIENT)
Age: 88
End: 2023-05-31

## 2023-06-07 ENCOUNTER — TRANSCRIPTION ENCOUNTER (OUTPATIENT)
Age: 88
End: 2023-06-07

## 2023-06-07 ENCOUNTER — NON-APPOINTMENT (OUTPATIENT)
Age: 88
End: 2023-06-07

## 2023-06-13 ENCOUNTER — TRANSCRIPTION ENCOUNTER (OUTPATIENT)
Age: 88
End: 2023-06-13

## 2023-06-14 ENCOUNTER — NON-APPOINTMENT (OUTPATIENT)
Age: 88
End: 2023-06-14

## 2023-07-15 ENCOUNTER — NON-APPOINTMENT (OUTPATIENT)
Age: 88
End: 2023-07-15

## 2023-07-15 ENCOUNTER — TRANSCRIPTION ENCOUNTER (OUTPATIENT)
Age: 88
End: 2023-07-15

## 2023-07-18 ENCOUNTER — APPOINTMENT (OUTPATIENT)
Dept: HOME HEALTH SERVICES | Facility: HOME HEALTH | Age: 88
End: 2023-07-18

## 2023-07-22 NOTE — HISTORY OF PRESENT ILLNESS
[Patient] : patient [FreeTextEntry1] : scoliosis [FreeTextEntry2] : Patient denies fever, cough, trouble breathing, rash, vomiting, diarrhea. Patient has not been in close contact with someone who is COVID positive.\par N95 mask, gloves, eye wear and gown (if indicated) used during visit: Y. \par Total face to face time with patient is 30 min.\par \par Patient is a 87 yo woman with a history of kyphoscoliosis, and history of localized breast cancer s/p mastectomy, who is being seen today for follow-up visit.\par \par updates: reports decreased hearing, states "my ears have wax and need to be cleaned", will return to clean ears. \par patient's  passed this year. \par patient also called regarding her ongoing scoliosis pain. \par \par At today's visit, patient seen sitting at kitchen table. Patient has aides [employer] - she is never alone. she prepares her own meals -- doesn’t cook, but will make sandwiches Aides help her shower. \par eating and drinking - ok\par \par adls - assistance with toilet (commode) and showering and dressing\par no falls since last visit\par no trouble breathing, no CP.  no insomnia.\par will sometimes take Tylenol for her scoliosis \par appetite is good \par \par urinating and BM - goes regularly, no issues.\par no hematochezia/melena. no dysuria/hematuria.  Patient AOx3, ambulatory, IV removed prior to leaving, patient stated she doesn't want to wait for the paperwork and needs to go home now.

## 2023-08-02 NOTE — REASON FOR VISIT
[phone] [Follow-Up] : a follow-up visit [Spouse] : spouse [Formal Caregiver] : formal caregiver

## 2023-08-08 ENCOUNTER — APPOINTMENT (OUTPATIENT)
Dept: HOME HEALTH SERVICES | Facility: HOME HEALTH | Age: 88
End: 2023-08-08
Payer: MEDICARE

## 2023-08-08 VITALS
SYSTOLIC BLOOD PRESSURE: 114 MMHG | DIASTOLIC BLOOD PRESSURE: 60 MMHG | RESPIRATION RATE: 14 BRPM | HEART RATE: 78 BPM | OXYGEN SATURATION: 97 % | TEMPERATURE: 97.7 F

## 2023-08-08 PROCEDURE — 99349 HOME/RES VST EST MOD MDM 40: CPT

## 2023-08-09 RX ORDER — TRAMADOL HYDROCHLORIDE 50 MG/1
50 TABLET, COATED ORAL
Qty: 12 | Refills: 0 | Status: DISCONTINUED | COMMUNITY
Start: 2023-05-16

## 2023-08-09 NOTE — PHYSICAL EXAM
[Well Nourished] : well nourished [Well Developed] : well developed [Normal Voice/Communication] : normal voice communication [EOMI] : extra ocular movement intact [Normal S1, S2] : normal S1 and S2 [No Murmurs] : no murmurs heard [Breast Exam Declined] : patient declined to have breast exam done [Not Distended] : not distended [No CVA Tenderness] : no ~M costovertebral angle tenderness [No Spinal Tenderness] : no spinal tenderness [No Clubbing, Cyanosis] : no clubbing  or cyanosis of the fingernails [No Motor Deficits] : the motor exam was normal [de-identified] : severe kyphoscoliosis [de-identified] : legs - onychogryphosis b/l all toenails; R leg has an open sore on shin.  sore comes and goes.  [No Acute Distress] : no acute distress [Normal Sclera/Conjunctiva] : normal sclera/conjunctiva [PERRL] : pupils equal, round and reactive to light [Normal Outer Ear/Nose] : the ears and nose were normal in appearance [Normal Oropharynx] : the oropharynx was normal [No JVD] : no jugular venous distention [Supple] : the neck was supple [No Respiratory Distress] : no respiratory distress [Clear to Auscultation] : lungs were clear to auscultation bilaterally [No Accessory Muscle Use] : no accessory muscle use [Normal Rate] : heart rate was normal  [Regular Rhythm] : with a regular rhythm [Pedal Pulses Present] : the pedal pulses are present [No Edema] : there was no peripheral edema [Normal Bowel Sounds] : normal bowel sounds [Non Tender] : non-tender [Soft] : abdomen soft [No HSM] : no hepato-splenomegaly [Kyphosis] :  kyphosis present [Scoliosis] : scoliosis present [No Rash] : no rash [No Skin Lesions] : no skin lesions [Acne] : no acne [Cranial Nerves Intact] : cranial nerves 2-12 were intact [No Gross Sensory Deficits] : no gross sensory deficits [Oriented x3] : oriented to person, place, and time [Normal Affect] : the affect was normal [Normal Mood] : the mood was normal [de-identified] : has severe kyphoscoliosis.

## 2023-08-09 NOTE — HISTORY OF PRESENT ILLNESS
[Patient] : patient [FreeTextEntry1] : scoliosis [FreeTextEntry2] : Patient is a 89 yo woman with a history of kyphoscoliosis, and history of localized breast cancer s/p mastectomy, who is being seen today for acute visit to clean ears.    At today's visit, patient seen sitting at kitchen table in her wheelchair. Patient has HHA for assistance with some ADLs, patient is alert & oriented. She is c/o of  bilateral hearing loss -- she has been using the debrox with god effect.. wax is softer than previous. Overall feels okay - "stiff" She is getting OOB with assistance and walking with walker - mostly, she sits on the wheelchair.  She is eating - feeds herself. eating well  GI/: she is urinating. She has intermittent constpation - when she has trouble having a BM she use milk of magnesia.   adls - assistance with toilet (commode) and showering and dressing no trouble breathing, no CP.  no insomnia. appetite is good   no hematochezia/melena. no dysuria/hematuria.

## 2023-08-31 ENCOUNTER — APPOINTMENT (OUTPATIENT)
Dept: HOME HEALTH SERVICES | Facility: HOME HEALTH | Age: 88
End: 2023-08-31
Payer: MEDICARE

## 2023-08-31 VITALS
OXYGEN SATURATION: 96 % | DIASTOLIC BLOOD PRESSURE: 68 MMHG | HEART RATE: 72 BPM | TEMPERATURE: 98.24 F | SYSTOLIC BLOOD PRESSURE: 132 MMHG | RESPIRATION RATE: 16 BRPM

## 2023-08-31 DIAGNOSIS — K44.9 DIAPHRAGMATIC HERNIA W/OUT OBSTRUCTION OR GANGRENE: ICD-10-CM

## 2023-08-31 PROCEDURE — 99348 HOME/RES VST EST LOW MDM 30: CPT

## 2023-10-01 ENCOUNTER — NON-APPOINTMENT (OUTPATIENT)
Age: 88
End: 2023-10-01

## 2023-10-02 ENCOUNTER — APPOINTMENT (OUTPATIENT)
Dept: HOME HEALTH SERVICES | Facility: HOME HEALTH | Age: 88
End: 2023-10-02

## 2023-11-03 PROBLEM — K44.9 HIATAL HERNIA: Status: ACTIVE | Noted: 2023-05-24

## 2023-11-03 RX ORDER — BISACODYL 10 MG/30ML
10 ENEMA RECTAL
Qty: 1 | Refills: 0 | Status: ACTIVE | COMMUNITY
Start: 2023-05-23

## 2023-11-03 RX ORDER — LORATADINE 5 MG
17 TABLET,CHEWABLE ORAL DAILY
Qty: 1 | Refills: 3 | Status: ACTIVE | COMMUNITY
Start: 2023-05-24

## 2023-11-03 RX ORDER — TRIAMCINOLONE ACETONIDE 1 MG/G
0.1 OINTMENT TOPICAL
Qty: 1 | Refills: 0 | Status: ACTIVE | COMMUNITY
Start: 2020-09-18

## 2023-11-03 RX ORDER — SENNOSIDES 8.6 MG TABLETS 8.6 MG/1
8.6 TABLET ORAL
Qty: 60 | Refills: 3 | Status: ACTIVE | COMMUNITY
Start: 2023-05-24

## 2023-11-22 ENCOUNTER — APPOINTMENT (OUTPATIENT)
Dept: HOME HEALTH SERVICES | Facility: HOME HEALTH | Age: 88
End: 2023-11-22
Payer: MEDICARE

## 2023-11-22 VITALS
SYSTOLIC BLOOD PRESSURE: 157 MMHG | DIASTOLIC BLOOD PRESSURE: 99 MMHG | TEMPERATURE: 98 F | HEART RATE: 85 BPM | OXYGEN SATURATION: 99 % | RESPIRATION RATE: 18 BRPM

## 2023-11-22 DIAGNOSIS — H35.3123 NONEXUDATIVE AGE-RELATED MACULAR DEGENERATION, LEFT EYE, ADVANCED ATROPHIC WITHOUT SUBFOVEAL INVOLVEMENT: ICD-10-CM

## 2023-11-22 DIAGNOSIS — M25.511 PAIN IN RIGHT SHOULDER: ICD-10-CM

## 2023-11-22 DIAGNOSIS — R03.0 ELEVATED BLOOD-PRESSURE READING, W/OUT DIAGNOSIS OF HYPERTENSION: ICD-10-CM

## 2023-11-22 DIAGNOSIS — M25.512 PAIN IN RIGHT SHOULDER: ICD-10-CM

## 2023-11-22 PROCEDURE — 69210 REMOVE IMPACTED EAR WAX UNI: CPT

## 2023-11-22 PROCEDURE — 99349 HOME/RES VST EST MOD MDM 40: CPT | Mod: 25

## 2023-11-22 RX ORDER — CHLORHEXIDINE GLUCONATE 4 %
LIQUID (ML) TOPICAL DAILY
Qty: 90 | Refills: 3 | Status: ACTIVE | COMMUNITY
Start: 2023-11-22 | End: 1900-01-01

## 2023-11-22 RX ORDER — KRILL/OM-3/DHA/EPA/PHOSPHO/AST 1000-230MG
81 CAPSULE ORAL DAILY
Qty: 90 | Refills: 0 | Status: COMPLETED | COMMUNITY
Start: 2020-01-29 | End: 2023-11-22

## 2023-11-22 RX ORDER — IBUPROFEN 600 MG/1
600 TABLET, FILM COATED ORAL
Qty: 28 | Refills: 0 | Status: DISCONTINUED | COMMUNITY
Start: 2023-05-16 | End: 2023-11-22

## 2023-11-24 ENCOUNTER — LABORATORY RESULT (OUTPATIENT)
Age: 88
End: 2023-11-24

## 2023-11-28 ENCOUNTER — LABORATORY RESULT (OUTPATIENT)
Age: 88
End: 2023-11-28

## 2023-11-30 ENCOUNTER — NON-APPOINTMENT (OUTPATIENT)
Age: 88
End: 2023-11-30

## 2024-02-21 NOTE — ED ADULT TRIAGE NOTE - CCCP TRG CHIEF CMPLNT
[facility]  Everardo Renae 73 y.o. male MRN: [id number]       CC: EGD consult    HPI: Jaswinder is a pleasant 72-year-old male with history of type 2 diabetes, Graves' disease, COPD, previous lung cancer status post chemoradiation, previous prostate cancer, CKD and rheumatoid arthritis on Rituxan.  Patient presents the office for chronic cough, worsening over the last 2 weeks.  He had similar complaints in December, had negative chest x-ray.  Patient reports that his symptoms are worse at night, and are better when he is sitting upwards.  He denies heartburn or regurgitation.  Patient reports that he does have mild dysphagia especially to course consistency foods like bread. Last CT chest was in November revealing stable multifocal bilateral pulmonary nodules.    Patient's last endoscopy was performed by Dr. Roman in 2016 revealing an esophageal ring that was dilated, and exudates in the esophagus consistent with Candida. Patient's colonoscopy was performed in March 2023 by Dr. Ross.  Diverticulosis noted, colon mucosa appeared normal, and 4 colon polyps removed.  These biopsied as tubular adenoma without dysplasia.  Random colon biopsy was negative for microscopic colitis.  He denies family history of colon cancer       Review of Systems:    CONSTITUTIONAL: Denies any fever, chills, or rigors. Good appetite, and no recent weight loss.  HEENT: No earache or tinnitus. Denies hearing loss or visual disturbances.  CARDIOVASCULAR: No chest pain or palpitations.   RESPIRATORY: Denies any cough, hemoptysis, shortness of breath or dyspnea on exertion.  GASTROINTESTINAL: As noted in the History of Present Illness.   GENITOURINARY: No problems with urination. Denies any hematuria or dysuria.  NEUROLOGIC: No dizziness or vertigo, denies headaches.   MUSCULOSKELETAL: Denies any muscle or joint pain.   SKIN: Denies skin rashes or itching.   ENDOCRINE: Denies excessive thirst. Denies intolerance to heat or  cold.  PSYCHOSOCIAL: Denies depression or anxiety. Denies any recent memory loss.       Current Outpatient Medications   Medication Sig Dispense Refill    albuterol (PROVENTIL HFA,VENTOLIN HFA) 90 mcg/act inhaler INHALE 2 PUFFS BY MOUTH EVERY 4 HOURS AS NEEDED FOR WHEEZING 18 g 1    amLODIPine (NORVASC) 5 mg tablet TAKE 1 TABLET (5 MG TOTAL) BY MOUTH DAILY. 90 tablet 0    ascorbic acid (VITAMIN C) 250 mg tablet Take 250 mg by mouth 2 (two) times a day      aspirin (ECOTRIN LOW STRENGTH) 81 mg EC tablet 1 tab(s)      atenolol (TENORMIN) 25 mg tablet TAKE 1 TABLET (25 MG TOTAL) BY MOUTH DAILY. 90 tablet 0    B-D ULTRAFINE III SHORT PEN 31G X 8 MM MISC Inject under the skin in the morning 30 each 0    Blood Glucose Monitoring Suppl (OneTouch Verio Reflect) w/Device KIT Check blood sugars twice daily. Please substitute with appropriate alternative as covered by patient's insurance. Dx: E11.65 1 kit 0    fenofibrate (TRICOR) 145 mg tablet TAKE 1 TABLET BY MOUTH EVERY DAY 90 tablet 0    ferrous sulfate 325 (65 Fe) mg tablet Take 325 mg by mouth 2 (two) times a day with meals      gabapentin (NEURONTIN) 600 MG tablet TAKE 1 TABLET BY MOUTH THREE TIMES A DAY 90 tablet 0    glimepiride (AMARYL) 1 mg tablet TAKE 1 TABLET BY MOUTH DAILY WITH BREAKFAST. (Patient taking differently: Take 1 mg by mouth daily with breakfast 2 tabs daily) 90 tablet 0    glucose blood (OneTouch Verio) test strip Use as instructed 100 strip 3    glucose blood (OneTouch Verio) test strip Check blood sugars twice daily. Please substitute with appropriate alternative as covered by patient's insurance. Dx: E11.65 200 each 3    metFORMIN (GLUCOPHAGE) 1000 MG tablet TAKE 1 TABLET (1,000 MG TOTAL) BY MOUTH 2 (TWO) TIMES A DAY WITH MEALS FOR 5000 DOSES 180 tablet 0    omeprazole (PriLOSEC) 40 MG capsule TAKE 1 CAPSULE BY MOUTH TWICE A  capsule 0    OneTouch Delica Lancets 33G MISC Check blood sugars twice daily. Please substitute with appropriate  alternative as covered by patient's insurance. Dx: E11.65 200 each 0    riTUXimab (Rituxan) injection       rosuvastatin (CRESTOR) 40 MG tablet TAKE 1 TABLET BY MOUTH EVERY DAY IN THE EVENING 90 tablet 0    tamsulosin (FLOMAX) 0.4 mg Take 1 capsule (0.4 mg total) by mouth every evening 30 capsule 3    Tresiba FlexTouch 100 units/mL injection pen INJECT UP TO 80 UNITS DAILY 30 mL 4    umeclidinium-vilanterol (Anoro Ellipta) 62.5-25 mcg/actuation inhaler Inhale 1 puff daily 180 blister 0    diphenoxylate-atropine (LOMOTIL) 2.5-0.025 mg per tablet Take 1 tablet by mouth daily at bedtime Skip dose if constipation occurs 30 tablet 0    meloxicam (Mobic) 7.5 mg tablet Take 1 tablet (7.5 mg total) by mouth daily as needed for moderate pain 30 tablet 5    methimazole (TAPAZOLE) 5 mg tablet Take 0.5 tablets (2.5 mg total) by mouth in the morning 45 tablet 2     No current facility-administered medications for this visit.     Past Medical History:   Diagnosis Date    Anemia     Aortic aneurysm (HCC)     BPH (benign prostatic hyperplasia)     CAD (coronary artery disease)     Chronic kidney disease     COPD (chronic obstructive pulmonary disease) (HCC)     Diabetes mellitus (HCC)     Dyslipidemia     Flu 12/2022    GERD (gastroesophageal reflux disease)     Graves disease     Hypertension     Iron deficiency anemia     Lactic acidosis     Lung cancer (HCC)     Lyme disease     Neuropathy     Pancytopenia (HCC)     Pneumonia 12/2022    Prostate cancer (HCC)     Rheumatoid arthritis (HCC)      Past Surgical History:   Procedure Laterality Date    BALLOON ANGIOPLASTY, ARTERY  1996    CATARACT EXTRACTION, BILATERAL  2019    CLAVICLE SURGERY      TOE AMPUTATION Left 11/20/2018    Procedure: AMPUTATION GREAT TOE;  Surgeon: Bettye Delgado DPM;  Location: [facility];  Service: Podiatry     Social History     Socioeconomic History    Marital status: /Civil Union     Spouse name: None    Number of children: None    Years of  education: None    Highest education level: None   Occupational History    None   Tobacco Use    Smoking status: Every Day     Current packs/day: 0.00     Average packs/day: 0.5 packs/day for 54.0 years (27.0 ttl pk-yrs)     Types: Cigarettes     Start date:      Last attempt to quit: 2022     Years since quittin.1    Smokeless tobacco: Never   Vaping Use    Vaping status: Never Used   Substance and Sexual Activity    Alcohol use: No    Drug use: No    Sexual activity: None   Other Topics Concern    None   Social History Narrative    None     Social Determinants of Health     Financial Resource Strain: Low Risk  (2023)    Overall Financial Resource Strain (CARDIA)     Difficulty of Paying Living Expenses: Not very hard   Food Insecurity: No Food Insecurity (2022)    Hunger Vital Sign     Worried About Running Out of Food in the Last Year: Never true     Ran Out of Food in the Last Year: Never true   Transportation Needs: No Transportation Needs (2023)    PRAPARE - Transportation     Lack of Transportation (Medical): No     Lack of Transportation (Non-Medical): No   Physical Activity: Not on file   Stress: Not on file   Social Connections: Not on file   Intimate Partner Violence: Not on file   Housing Stability: Low Risk  (2022)    Housing Stability Vital Sign     Unable to Pay for Housing in the Last Year: No     Number of Places Lived in the Last Year: 1     Unstable Housing in the Last Year: No     Family History   Problem Relation Age of Onset    No Known Problems Mother     Coronary artery disease Father     No Known Problems Sister     No Known Problems Brother     No Known Problems Maternal Grandmother     No Known Problems Maternal Grandfather     No Known Problems Paternal Grandmother     No Known Problems Paternal Grandfather     No Known Problems Maternal Aunt     No Known Problems Maternal Uncle     No Known Problems Paternal Aunt     No Known Problems Paternal Uncle      "No Known Problems Cousin             PHYSICAL EXAM:    Vitals:    02/21/24 1016   BP: 128/82   BP Location: Left arm   Patient Position: Sitting   Pulse: 88   Weight: 87.1 kg (192 lb)   Height: 5' 10\" (1.778 m)     General Appearance:   Alert and oriented x 3. Cooperative, and in no respiratory distress   HEENT:   Normocephalic, atraumatic, anicteric.     Neck:  Supple, symmetrical, trachea midline   Lungs:   Clear to auscultation bilaterally    Heart::   Regular rate and rhythm   Abdomen:   Soft, non-tender, non-distended; normal bowel sounds; no masses, no organomegaly    Genitalia:   Deferred    Rectal:   Deferred    Extremities:  No cyanosis, clubbing or edema    Pulses:  2+ and symmetric all extremities    Skin:  Skin color, texture, turgor normal, no rashes or lesions    Lymph nodes:  No palpable cervical or supraclavicular lymphadenopathy        Lab Results:       Results from last 6 Months   Lab Units 10/25/23  1010   POTASSIUM mmol/L 4.6   CHLORIDE mmol/L 105   CO2 mmol/L 25   BUN mg/dL 29*   CREATININE mg/dL 1.25   CALCIUM mg/dL 9.6   ALK PHOS U/L 58   ALT U/L 5*   AST U/L 9*               Imaging Studies:   XR chest 1 view portable    Result Date: 12/8/2023  Impression: No acute cardiopulmonary disease. Redemonstration of multiple lung nodules. Workstation performed: [id number]       ASSESSMENT and PLAN:      1) Dysphagia, chronic cough, history of esophageal ring and Candida esophagitis - Lungs sound clear on exam today.  He had similar complaints in December, had a negative chest x-ray.  He follows with oncology for history of lung cancer and pulmonary nodules.  His symptoms could be secondary to recurrence of esophageal ring, silent reflux, Hernández's esophagus, etc.  - We went over risks and benefits of EGD, patient will schedule procedure  - Continue omeprazole twice daily as prescribed by patient's PCP  - Referral to pulmonary for history of COPD and lung nodules    2) Fecal incontinence, dysuria and " "penile pain - Referred to pelvic floor physical therapy, patient was recently seen by urology who agrees with this plan.  Encourage patient to schedule consultation.  Continue regimen of Lomotil and colestipol.    Follow up after EGD.      Portions of the record may have been created with voice recognition software.  Occasional wrong word or \"sound a like\" substitutions may have occurred due to the inherent limitations of voice recognition software.  Read the chart carefully and recognize, using context, where substitutions have occurred.  " fall

## 2024-03-11 ENCOUNTER — APPOINTMENT (OUTPATIENT)
Dept: HOME HEALTH SERVICES | Facility: HOME HEALTH | Age: 89
End: 2024-03-11
Payer: MEDICARE

## 2024-03-11 VITALS
RESPIRATION RATE: 18 BRPM | DIASTOLIC BLOOD PRESSURE: 97 MMHG | OXYGEN SATURATION: 98 % | HEART RATE: 97 BPM | SYSTOLIC BLOOD PRESSURE: 152 MMHG | TEMPERATURE: 97.4 F

## 2024-03-11 DIAGNOSIS — Z71.89 OTHER SPECIFIED COUNSELING: ICD-10-CM

## 2024-03-11 DIAGNOSIS — E55.9 VITAMIN D DEFICIENCY, UNSPECIFIED: ICD-10-CM

## 2024-03-11 PROCEDURE — 99497 ADVNCD CARE PLAN 30 MIN: CPT

## 2024-03-11 PROCEDURE — 99349 HOME/RES VST EST MOD MDM 40: CPT | Mod: 25

## 2024-03-11 RX ORDER — VIT C/E/ZN/COPPR/LUTEIN/ZEAXAN 250MG-90MG
CAPSULE ORAL
Qty: 90 | Refills: 3 | Status: ACTIVE | COMMUNITY
Start: 2023-11-22 | End: 1900-01-01

## 2024-03-11 NOTE — COUNSELING
[Sodium restriction 2gm recommended] : sodium restriction 2 gm recommended [Non - Smoker] : non-smoker [Use assistive device to avoid falls] : use assistive device to avoid falls [Remove clutter and unsafe carpeting to avoid falls] : remove clutter and unsafe carpeting to avoid falls [] : abdominal aortic ultrasound [Decrease hospital use] : decrease hospital use [Minimize unnecessary interventions] : minimize unnecessary interventions [Completed DNR] : completed DNR [Completed Medical Orders for Life-Sustaining Treatment] : completed medical orders for life-sustaining treatment [DNR] : Code Status: DNR [Limited] : Treatment Guidelines: Limited [DNI] : Intubation: DNI [[facility]: _____] : [facility] Com[facility]/last verification date: [unfilled] [Continue diet as tolerated] : continue diet as tolerated based on goals of care

## 2024-03-11 NOTE — END OF VISIT
all other ROS negative except as per HPI [Time Spent: ___ minutes] : I have spent [unfilled] minutes of time on the encounter.

## 2024-03-14 LAB
25(OH)D3 SERPL-MCNC: 22.3 NG/ML
ANION GAP SERPL CALC-SCNC: 11 MMOL/L
BASOPHILS # BLD AUTO: 0.04 K/UL
BASOPHILS NFR BLD AUTO: 0.3 %
BUN SERPL-MCNC: 20 MG/DL
CALCIUM SERPL-MCNC: 9.1 MG/DL
CHLORIDE SERPL-SCNC: 100 MMOL/L
CO2 SERPL-SCNC: 30 MMOL/L
CREAT SERPL-MCNC: 0.5 MG/DL
EGFR: 90 ML/MIN/1.73M2
EOSINOPHIL # BLD AUTO: 0.07 K/UL
EOSINOPHIL NFR BLD AUTO: 0.6 %
GLUCOSE SERPL-MCNC: 245 MG/DL
HCT VFR BLD CALC: 36.2 %
HGB BLD-MCNC: 11.2 G/DL
IMM GRANULOCYTES NFR BLD AUTO: 0.5 %
LYMPHOCYTES # BLD AUTO: 1.44 K/UL
LYMPHOCYTES NFR BLD AUTO: 11.9 %
MAN DIFF?: NORMAL
MCHC RBC-ENTMCNC: 30.9 GM/DL
MCHC RBC-ENTMCNC: 33.9 PG
MCV RBC AUTO: 109.7 FL
MONOCYTES # BLD AUTO: 0.74 K/UL
MONOCYTES NFR BLD AUTO: 6.1 %
NEUTROPHILS # BLD AUTO: 9.8 K/UL
NEUTROPHILS NFR BLD AUTO: 80.6 %
PLATELET # BLD AUTO: 304 K/UL
POTASSIUM SERPL-SCNC: 3.3 MMOL/L
RBC # BLD: 3.3 M/UL
RBC # FLD: 12.1 %
SODIUM SERPL-SCNC: 141 MMOL/L
WBC # FLD AUTO: 12.15 K/UL

## 2024-03-14 NOTE — HISTORY OF PRESENT ILLNESS
[Patient] : patient [FreeTextEntry1] : scoliosis [FreeTextEntry2] : PMH kyphoscoliosis, and history of localized breast cancer s/p mastectomy, who is being seen today for follow-up visit.   Interval events: No interval events.   Patient lives alone single family home. She is alert & oriented X4 - does require assistance with ADL's -- has HHA in the home with her.   Skin: wound R shin,  Pain: stomach pain (chronic gas) Gait/falls: walk minimally with walker - more for transferring, mostly wheelchair/bedbound Appetite:  feeds herself. eating well "when I eat". Eats breakfast and lunch, no dinner. Doesnt eat vegetables or fruit.  BM: regular Urine: during day continent, incontinent at night Sleep: good Mood: some good days, some bad days.   #spine/kyphosis - bothersome and some painful -- but, manages with tylenol. No further interventions at this point.  #macular degeneration - following with optho, supposed to be taking AREDS but not taking, did not receive pills that were sent to pharmacy, molly  HHA: 24/7

## 2024-03-14 NOTE — REVIEW OF SYSTEMS
[Hearing Loss] : hearing loss [Joint Stiffness] : joint stiffness [Back Pain] : back pain [Negative] : Psychiatric [FreeTextEntry7] : Gas and bloating

## 2024-03-14 NOTE — HEALTH RISK ASSESSMENT
[HRA Reviewed] : Health risk assessment reviewed [Independent] : using telephone [Some assistance needed] : managing medications [Any fall with injury in past year] : Patient reported fall with injury in the past year [Yes] : The patient does have visual impairment [Full assistance needed] : preparing meals [de-identified] : needs assistance with walker and holding on to walls. [TimeGetUpGo] : 60

## 2024-03-14 NOTE — REASON FOR VISIT
[Follow-Up] : a follow-up visit [Spouse] : spouse [Formal Caregiver] : formal caregiver [Pre-Visit Preparation] : pre-visit preparation was done [FreeTextEntry1] : follow up of chronic conditions, scoliosis [FreeTextEntry2] : chart review

## 2024-03-14 NOTE — CURRENT MEDS
Triage RN applied ice at this time, pt refused nestor De Luna RN  02/25/21 0043 [Medication and Allergies Reconciled] : medication and allergies reconciled [High Risk Medications Reviewed and Reconciled (Beers Criteria)] : high risk medications reviewed and reconciled [Reviewed patient reported medication adherence from Comprehensive Assessment] : Reviewed patient reported medication adherence from comprehensive assessment [Adherent to medications] : Patient is adherent to medications as prescribed [Takes no medications] : Patient takes no medications [de-identified] : not able to be adherent to AREDS, did not receive sent prescriptions

## 2024-03-14 NOTE — PHYSICAL EXAM
[No Acute Distress] : no acute distress [Normal Voice/Communication] : normal voice communication [Normal Sclera/Conjunctiva] : normal sclera/conjunctiva [Normal Outer Ear/Nose] : the ears and nose were normal in appearance [Supple] : the neck was supple [No Respiratory Distress] : no respiratory distress [Clear to Auscultation] : lungs were clear to auscultation bilaterally [No Accessory Muscle Use] : no accessory muscle use [Normal Rate] : heart rate was normal  [Regular Rhythm] : with a regular rhythm [Normal S1, S2] : normal S1 and S2 [No Murmurs] : no murmurs heard [No LE Varicosities] : there were no varicosital changes in the lower extremities [Pedal Pulses Present] : the pedal pulses are present [Normal Bowel Sounds] : normal bowel sounds [Non Tender] : non-tender [Soft] : abdomen soft [Kyphosis] :  kyphosis present [Scoliosis] : scoliosis present [No Joint Swelling] : no joint swelling seen [Cranial Nerves Intact] : cranial nerves 2-12 were intact [No Motor Deficits] : the motor exam was normal [Oriented x3] : oriented to person, place, and time [Normal Affect] : the affect was normal [de-identified] : Unable to view TM's due to cerumen impaction b/l [No Gross Sensory Deficits] : no gross sensory deficits [Normal Mood] : the mood was normal [de-identified] : severe kyphoscoliosis; alert & oriented. no distress.  [de-identified] : has severe kyphoscoliosis. [de-identified] : trace LE edema [de-identified] : RLE 3x4 with slough

## 2024-03-14 NOTE — ASSESSMENT
[FreeTextEntry1] : PNA vaccine cbc, bmp, vit D Will refer to homecare for RN  Woundcare:  Recommended wash with saline/sterile water, medihoney, and nonadhesive overlyign dressing 3x weely

## 2024-04-11 ENCOUNTER — NON-APPOINTMENT (OUTPATIENT)
Age: 89
End: 2024-04-11

## 2024-04-15 ENCOUNTER — MED ADMIN CHARGE (OUTPATIENT)
Age: 89
End: 2024-04-15

## 2024-04-15 ENCOUNTER — APPOINTMENT (OUTPATIENT)
Dept: HOME HEALTH SERVICES | Facility: HOME HEALTH | Age: 89
End: 2024-04-15
Payer: MEDICARE

## 2024-04-15 VITALS
HEART RATE: 91 BPM | OXYGEN SATURATION: 96 % | RESPIRATION RATE: 17 BRPM | DIASTOLIC BLOOD PRESSURE: 76 MMHG | SYSTOLIC BLOOD PRESSURE: 126 MMHG | TEMPERATURE: 97.6 F

## 2024-04-15 PROCEDURE — 90677 PCV20 VACCINE IM: CPT

## 2024-04-15 PROCEDURE — G0009: CPT

## 2024-05-09 ENCOUNTER — NON-APPOINTMENT (OUTPATIENT)
Age: 89
End: 2024-05-09

## 2024-06-07 ENCOUNTER — APPOINTMENT (OUTPATIENT)
Dept: HOME HEALTH SERVICES | Facility: HOME HEALTH | Age: 89
End: 2024-06-07
Payer: MEDICARE

## 2024-06-07 DIAGNOSIS — H61.23 IMPACTED CERUMEN, BILATERAL: ICD-10-CM

## 2024-06-07 DIAGNOSIS — H35.30 UNSPECIFIED MACULAR DEGENERATION: ICD-10-CM

## 2024-06-07 DIAGNOSIS — E63.9 NUTRITIONAL DEFICIENCY, UNSPECIFIED: ICD-10-CM

## 2024-06-07 DIAGNOSIS — M41.9 SCOLIOSIS, UNSPECIFIED: ICD-10-CM

## 2024-06-07 DIAGNOSIS — S81.801D UNSPECIFIED OPEN WOUND, RIGHT LOWER LEG, SUBSEQUENT ENCOUNTER: ICD-10-CM

## 2024-06-07 DIAGNOSIS — K59.09 OTHER CONSTIPATION: ICD-10-CM

## 2024-06-07 PROCEDURE — 69210 REMOVE IMPACTED EAR WAX UNI: CPT

## 2024-06-07 PROCEDURE — 99348 HOME/RES VST EST LOW MDM 30: CPT | Mod: 25

## 2024-06-07 NOTE — COUNSELING
[Sodium restriction 2gm recommended] : sodium restriction 2 gm recommended [Continue diet as tolerated] : continue diet as tolerated based on goals of care [Non - Smoker] : non-smoker [Use assistive device to avoid falls] : use assistive device to avoid falls [Remove clutter and unsafe carpeting to avoid falls] : remove clutter and unsafe carpeting to avoid falls [] : abdominal aortic ultrasound [Decrease hospital use] : decrease hospital use [Minimize unnecessary interventions] : minimize unnecessary interventions [Completed DNR] : completed DNR [Completed Medical Orders for Life-Sustaining Treatment] : completed medical orders for life-sustaining treatment [DNR] : Code Status: DNR [Limited] : Treatment Guidelines: Limited [DNI] : Intubation: DNI [[facility] Date: _____] [facility] Com[facility]/last verification date: [unfilled]

## 2024-06-08 VITALS
OXYGEN SATURATION: 98 % | DIASTOLIC BLOOD PRESSURE: 71 MMHG | SYSTOLIC BLOOD PRESSURE: 154 MMHG | RESPIRATION RATE: 18 BRPM | HEART RATE: 92 BPM | TEMPERATURE: 98.1 F

## 2024-06-08 PROBLEM — S81.801D WOUND OF RIGHT LOWER EXTREMITY, SUBSEQUENT ENCOUNTER: Status: ACTIVE | Noted: 2023-11-22

## 2024-06-08 PROBLEM — K59.09 CHRONIC CONSTIPATION: Status: ACTIVE | Noted: 2023-05-23

## 2024-06-08 PROBLEM — H61.23 BILATERAL IMPACTED CERUMEN: Status: ACTIVE | Noted: 2021-06-27

## 2024-06-08 PROBLEM — E63.9 POOR NUTRITION: Status: ACTIVE | Noted: 2023-11-22

## 2024-06-08 PROBLEM — H35.30 MACULAR DEGENERATION: Status: ACTIVE | Noted: 2023-11-22

## 2024-06-08 PROBLEM — M41.9 SCOLIOSIS/KYPHOSCOLIOSIS: Status: ACTIVE | Noted: 2020-01-29

## 2024-06-08 NOTE — PHYSICAL EXAM
[No Acute Distress] : no acute distress [Normal Voice/Communication] : normal voice communication [Normal Sclera/Conjunctiva] : normal sclera/conjunctiva [Normal Outer Ear/Nose] : the ears and nose were normal in appearance [Supple] : the neck was supple [No Respiratory Distress] : no respiratory distress [Clear to Auscultation] : lungs were clear to auscultation bilaterally [No Accessory Muscle Use] : no accessory muscle use [Normal Rate] : heart rate was normal  [Regular Rhythm] : with a regular rhythm [Normal S1, S2] : normal S1 and S2 [No Murmurs] : no murmurs heard [No LE Varicosities] : there were no varicosital changes in the lower extremities [Pedal Pulses Present] : the pedal pulses are present [Normal Bowel Sounds] : normal bowel sounds [Non Tender] : non-tender [Soft] : abdomen soft [Kyphosis] :  kyphosis present [Scoliosis] : scoliosis present [No Joint Swelling] : no joint swelling seen [No Gross Sensory Deficits] : no gross sensory deficits [Normal Affect] : the affect was normal [Normal Mood] : the mood was normal [de-identified] : severe kyphoscoliosis; alert & oriented. no distress.  [de-identified] : trace LE edema [de-identified] : has severe kyphoscoliosis. [de-identified] : Stable RLE 3x4 with slough

## 2024-06-08 NOTE — HEALTH RISK ASSESSMENT
[HRA Reviewed] : Health risk assessment reviewed [Independent] : using telephone [Some assistance needed] : managing medications [Full assistance needed] : managing finances [Any fall with injury in past year] : Patient reported fall with injury in the past year [Yes] : The patient does have visual impairment [TimeGetUpGo] : 60 [de-identified] : needs assistance with walker and holding on to walls.

## 2024-06-08 NOTE — REVIEW OF SYSTEMS
[Hearing Loss] : hearing loss [Joint Stiffness] : joint stiffness [Back Pain] : back pain [Negative] : Heme/Lymph [FreeTextEntry7] : Gas and bloating

## 2024-06-08 NOTE — REASON FOR VISIT
[Follow-Up] : a follow-up visit [Formal Caregiver] : formal caregiver [Pre-Visit Preparation] : pre-visit preparation was done [Intercurrent Specialty/Sub-specialty Visits] : the patient has no intercurrent specialty/sub-specialty visits [FreeTextEntry1] : follow up of chronic conditions, scoliosis [FreeTextEntry2] : chart review

## 2024-06-08 NOTE — CURRENT MEDS
[Medication and Allergies Reconciled] : medication and allergies reconciled [High Risk Medications Reviewed and Reconciled (Beers Criteria)] : high risk medications reviewed and reconciled [Reviewed patient reported medication adherence from Comprehensive Assessment] : Reviewed patient reported medication adherence from comprehensive assessment [Takes no medications] : Patient takes no medications [de-identified] : not able to be adherent to AREDS, did not receive sent prescriptions

## 2024-06-08 NOTE — HISTORY OF PRESENT ILLNESS
[Patient] : patient [FreeTextEntry1] : scoliosis [FreeTextEntry2] : PMH kyphoscoliosis, and history of localized breast cancer s/p mastectomy, who is being seen today for follow-up visit.   Interval events: No interval events.   Patient lives alone single family home. She is alert & oriented X4 - does require assistance with ADL's -- has HHA in the home with her.   Skin: wound R shin, improved from prior Pain: stomach pain (chronic gas) Gait/falls: walk minimally with walker - more for transferring, mostly wheelchair/bedbound Appetite:  feeds herself. eating well "when I eat". Eats breakfast and lunch, no dinner. Doesnt eat vegetables or fruit.  BM: regular Urine: during day continent, incontinent at night Sleep: good Mood: some good days, some bad days.   #spine/kyphosis - bothersome and some painful -- but, manages with tylenol. No further interventions at this point.  #macular degeneration - following with optho, supposed to be taking AREDS but not taking  HHA: 24/7 GOC: comfort care, dnr/dni/dnh

## 2024-08-16 ENCOUNTER — APPOINTMENT (OUTPATIENT)
Dept: HOME HEALTH SERVICES | Facility: HOME HEALTH | Age: 89
End: 2024-08-16
Payer: MEDICARE

## 2024-08-16 VITALS
OXYGEN SATURATION: 97 % | DIASTOLIC BLOOD PRESSURE: 75 MMHG | HEART RATE: 85 BPM | TEMPERATURE: 98.4 F | RESPIRATION RATE: 18 BRPM | SYSTOLIC BLOOD PRESSURE: 115 MMHG

## 2024-08-16 DIAGNOSIS — K59.09 OTHER CONSTIPATION: ICD-10-CM

## 2024-08-16 DIAGNOSIS — H91.90 UNSPECIFIED HEARING LOSS, UNSPECIFIED EAR: ICD-10-CM

## 2024-08-16 DIAGNOSIS — H35.30 UNSPECIFIED MACULAR DEGENERATION: ICD-10-CM

## 2024-08-16 DIAGNOSIS — H61.23 IMPACTED CERUMEN, BILATERAL: ICD-10-CM

## 2024-08-16 DIAGNOSIS — M41.9 SCOLIOSIS, UNSPECIFIED: ICD-10-CM

## 2024-08-16 PROCEDURE — 99348 HOME/RES VST EST LOW MDM 30: CPT

## 2024-08-16 NOTE — PHYSICAL EXAM
[No Acute Distress] : no acute distress [Normal Voice/Communication] : normal voice communication [Normal Sclera/Conjunctiva] : normal sclera/conjunctiva [Normal Outer Ear/Nose] : the ears and nose were normal in appearance [Supple] : the neck was supple [No Respiratory Distress] : no respiratory distress [Clear to Auscultation] : lungs were clear to auscultation bilaterally [No Accessory Muscle Use] : no accessory muscle use [Normal Rate] : heart rate was normal  [Regular Rhythm] : with a regular rhythm [Normal S1, S2] : normal S1 and S2 [No Murmurs] : no murmurs heard [No LE Varicosities] : there were no varicosital changes in the lower extremities [Pedal Pulses Present] : the pedal pulses are present [Normal Bowel Sounds] : normal bowel sounds [Non Tender] : non-tender [Soft] : abdomen soft [Kyphosis] :  kyphosis present [Scoliosis] : scoliosis present [No Joint Swelling] : no joint swelling seen [No Gross Sensory Deficits] : no gross sensory deficits [Normal Affect] : the affect was normal [Normal Mood] : the mood was normal [de-identified] : severe kyphoscoliosis; alert & oriented. no distress.  [de-identified] : trace LE edema [de-identified] : has severe kyphoscoliosis. [de-identified] : Stable RLE scarring with one area of scab

## 2024-08-16 NOTE — CURRENT MEDS
[Medication and Allergies Reconciled] : medication and allergies reconciled [High Risk Medications Reviewed and Reconciled (Beers Criteria)] : high risk medications reviewed and reconciled [Reviewed patient reported medication adherence from Comprehensive Assessment] : Reviewed patient reported medication adherence from comprehensive assessment [Takes no medications] : Patient takes no medications [de-identified] : not able to be adherent to AREDS, did not receive sent prescriptions

## 2024-08-16 NOTE — COUNSELING
[Sodium restriction 2gm recommended] : sodium restriction 2 gm recommended [Continue diet as tolerated] : continue diet as tolerated based on goals of care [Non - Smoker] : non-smoker [Use assistive device to avoid falls] : use assistive device to avoid falls [Remove clutter and unsafe carpeting to avoid falls] : remove clutter and unsafe carpeting to avoid falls [] : abdominal aortic ultrasound [Decrease hospital use] : decrease hospital use [Minimize unnecessary interventions] : minimize unnecessary interventions [Completed DNR] : completed DNR [Completed Medical Orders for Life-Sustaining Treatment] : completed medical orders for life-sustaining treatment [DNR] : Code Status: DNR [Limited] : Treatment Guidelines: Limited [DNI] : Intubation: DNI [[facility] Date: _____] [facility]/last verification date: [unfilled]

## 2024-08-16 NOTE — HEALTH RISK ASSESSMENT
[HRA Reviewed] : Health risk assessment reviewed [Independent] : using telephone [Some assistance needed] : managing medications [Full assistance needed] : managing finances [Any fall with injury in past year] : Patient reported fall with injury in the past year [Yes] : The patient does have visual impairment [TimeGetUpGo] : 60 [de-identified] : needs assistance with walker and holding on to walls.

## 2024-08-20 ENCOUNTER — LABORATORY RESULT (OUTPATIENT)
Age: 89
End: 2024-08-20

## 2024-08-23 ENCOUNTER — LABORATORY RESULT (OUTPATIENT)
Age: 89
End: 2024-08-23

## 2024-12-05 ENCOUNTER — APPOINTMENT (OUTPATIENT)
Dept: HOME HEALTH SERVICES | Facility: HOME HEALTH | Age: 89
End: 2024-12-05
Payer: MEDICARE

## 2024-12-05 VITALS
DIASTOLIC BLOOD PRESSURE: 80 MMHG | HEART RATE: 88 BPM | SYSTOLIC BLOOD PRESSURE: 130 MMHG | RESPIRATION RATE: 17 BRPM | OXYGEN SATURATION: 97 %

## 2024-12-05 DIAGNOSIS — M25.511 PAIN IN RIGHT SHOULDER: ICD-10-CM

## 2024-12-05 DIAGNOSIS — H61.23 IMPACTED CERUMEN, BILATERAL: ICD-10-CM

## 2024-12-05 DIAGNOSIS — K59.09 OTHER CONSTIPATION: ICD-10-CM

## 2024-12-05 DIAGNOSIS — M41.9 SCOLIOSIS, UNSPECIFIED: ICD-10-CM

## 2024-12-05 DIAGNOSIS — M25.512 PAIN IN RIGHT SHOULDER: ICD-10-CM

## 2024-12-05 PROCEDURE — 99349 HOME/RES VST EST MOD MDM 40: CPT | Mod: 25

## 2024-12-05 PROCEDURE — 69210 REMOVE IMPACTED EAR WAX UNI: CPT

## 2025-02-06 NOTE — ED ADULT NURSE REASSESSMENT NOTE - BOWEL SOUNDS LLQ
PT order was received.  Patient and spouse report that he does not require PT interventions.  We discussed the importance of mobility and they are hoping he can go home today.  PT will sign off at this time.       
present

## 2025-02-19 ENCOUNTER — APPOINTMENT (OUTPATIENT)
Dept: HOME HEALTH SERVICES | Facility: HOME HEALTH | Age: 89
End: 2025-02-19
Payer: MEDICARE

## 2025-02-19 VITALS
DIASTOLIC BLOOD PRESSURE: 70 MMHG | OXYGEN SATURATION: 98 % | HEART RATE: 92 BPM | SYSTOLIC BLOOD PRESSURE: 112 MMHG | RESPIRATION RATE: 17 BRPM

## 2025-02-19 DIAGNOSIS — M41.9 SCOLIOSIS, UNSPECIFIED: ICD-10-CM

## 2025-02-19 DIAGNOSIS — B35.1 TINEA UNGUIUM: ICD-10-CM

## 2025-02-19 DIAGNOSIS — M25.512 PAIN IN RIGHT SHOULDER: ICD-10-CM

## 2025-02-19 DIAGNOSIS — M19.90 UNSPECIFIED OSTEOARTHRITIS, UNSPECIFIED SITE: ICD-10-CM

## 2025-02-19 DIAGNOSIS — K59.09 OTHER CONSTIPATION: ICD-10-CM

## 2025-02-19 DIAGNOSIS — I49.8 OTHER SPECIFIED CARDIAC ARRHYTHMIAS: ICD-10-CM

## 2025-02-19 DIAGNOSIS — M25.511 PAIN IN RIGHT SHOULDER: ICD-10-CM

## 2025-02-19 PROCEDURE — 99349 HOME/RES VST EST MOD MDM 40: CPT

## 2025-02-19 RX ORDER — MAGNESIUM HYDROXIDE 400 MG/5ML
1200 SUSPENSION, ORAL (FINAL DOSE FORM) ORAL
Refills: 0 | Status: ACTIVE | COMMUNITY
Start: 2025-02-19

## 2025-02-19 RX ORDER — ASPIRIN ENTERIC COATED TABLETS 81 MG 81 MG/1
81 TABLET, DELAYED RELEASE ORAL DAILY
Refills: 0 | Status: ACTIVE | COMMUNITY
Start: 2025-02-19

## 2025-04-08 NOTE — ED PROVIDER NOTE - CROS ED ROS STATEMENT
all other ROS negative except as per HPI 72 year old man with hx of HTN with no prior visit to this facility who presents with weakness, dizziness preceded by  few days of dark stools, nausea and dark colored emesis as well but no abdominal pain. Admit for UGIB and pancreatic mass    #Acute symptomatic anemia 2/2 UGIB  #Pancreatic mass  #Melena  #HTN  - Patient with continued melena and downtrending hgb requiring 3U pRBC overnight   - EGD with EUS today  - MR with IV contrast to assess pancreas and liver  - f/u GI recs  - c/w PPI 72 year old man with hx of HTN with no prior visit to this facility who presents with weakness, dizziness preceded by  few days of dark stools, nausea and dark colored emesis as well but no abdominal pain. Admit for UGIB and pancreatic mass    #Acute symptomatic anemia 2/2 GIB  #Pancreatic mass  #Melena  #HTN  - Patient with continued melena and downtrending hgb    - EGD with EUS tomorrow  - MR with IV contrast to assess pancreas and liver  - f/u GI recs  - c/w PPI IV  - Trend Hgb

## 2025-04-25 ENCOUNTER — APPOINTMENT (OUTPATIENT)
Dept: HOME HEALTH SERVICES | Facility: HOME HEALTH | Age: 89
End: 2025-04-25

## 2025-05-06 ENCOUNTER — APPOINTMENT (OUTPATIENT)
Dept: HOME HEALTH SERVICES | Facility: HOME HEALTH | Age: 89
End: 2025-05-06
Payer: MEDICARE

## 2025-05-06 VITALS
OXYGEN SATURATION: 98 % | HEART RATE: 84 BPM | DIASTOLIC BLOOD PRESSURE: 68 MMHG | SYSTOLIC BLOOD PRESSURE: 108 MMHG | RESPIRATION RATE: 16 BRPM | TEMPERATURE: 97.1 F

## 2025-05-06 DIAGNOSIS — Z71.89 OTHER SPECIFIED COUNSELING: ICD-10-CM

## 2025-05-06 DIAGNOSIS — I10 ESSENTIAL (PRIMARY) HYPERTENSION: ICD-10-CM

## 2025-05-06 DIAGNOSIS — M15.0 PRIMARY GENERALIZED (OSTEO)ARTHRITIS: ICD-10-CM

## 2025-05-06 DIAGNOSIS — H61.22 IMPACTED CERUMEN, LEFT EAR: ICD-10-CM

## 2025-05-06 DIAGNOSIS — K59.09 OTHER CONSTIPATION: ICD-10-CM

## 2025-05-06 DIAGNOSIS — E55.9 VITAMIN D DEFICIENCY, UNSPECIFIED: ICD-10-CM

## 2025-05-06 DIAGNOSIS — M41.9 SCOLIOSIS, UNSPECIFIED: ICD-10-CM

## 2025-05-06 PROCEDURE — 69210 REMOVE IMPACTED EAR WAX UNI: CPT

## 2025-05-06 PROCEDURE — 99349 HOME/RES VST EST MOD MDM 40: CPT | Mod: 25

## 2025-05-06 PROCEDURE — 99497 ADVNCD CARE PLAN 30 MIN: CPT

## 2025-05-21 NOTE — ED PROVIDER NOTE - NS ED ROS FT
Caller: LAYTON MCMAHAN    Relationship: Other    Best call back number: [phone]     What orders are you requesting (i.e. lab or imaging): EVALUATION FOR SKILLED NURSING AND PHYSICAL THERAPY         
  Date of Office Visit: 2021  Encounter Provider: SIMONE Vickers  Place of Service: [facility] CARDIOLOGY  Patient Name: Madison Acevedo  :1959  Primary Cardiologist: Dr. Gael Rodriguez     Chief Complaint   Patient presents with   • Palpitations   • Chest Pain   • Follow-up   :     HPI: Madison Acevedo is a pleasant 62 y.o. female who presents today for follow-up on palpitations and chest pain.  I have reviewed her medical records.  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    In , she saw Dr. Rosi Thomason for evaluation of palpitations and had a normal echocardiogram at that time.     In 2020, she saw Dr. Gael Rodriguez in the office for evaluation of palpitations.  She had been taking pseudoephedrine daily for years for allergies and said she had a history of borderline hypertension.  She was recommended to stop taking her decongestant and caffeine and to start magnesium oxide 400 mg daily.  Echocardiogram at that time showed normal LVEF of 59% and trivial pericardial effusion.  She was started on amlodipine for hypertension.     In May 2021, she was evaluated in our [facility] for chest pain that she described as a pressure radiating to her jaws.  Her symptoms worsens with activity improved with rest.  She reported mild dyspnea and fatigue.  She ruled out for acute coronary syndrome.  Stress echocardiogram was normal.  D-dimer was normal.  She was recommended to follow-up with GI.    Today is her follow-up visit.  She denies any further chest pain.  She says it was related to her heartburn and her GI medications were adjusted.  She has palpitations twice per month for a brief second, never sustained.  She denies chest pain, shortness of air, edema, dizziness, syncope, or bleeding.  She thinks she is developing arthritis in her feet and ankles.  Her blood pressure and heart rate are both normal today.    Past Medical History:   Diagnosis Date   • 
Left detailed message orders okay   
Arrhythmia     palpitations   • Arthritis    • COVID-19 2021   • Esophageal reflux    • Esophageal spasm    • Essential hypertension 2020   • Fibrocystic disease of breast    • Hyperlipidemia    • Palpitations 2020   • PONV (postoperative nausea and vomiting)    • Vitamin D deficiency        Past Surgical History:   Procedure Laterality Date   • CHOLECYSTECTOMY     • COLONOSCOPY N/A 1/3/2018    Procedure: COLONOSCOPY TO CECUM AND TERMINAL ILEUM;  Surgeon: Jan rBannon MD;  Location: [facility] ENDOSCOPY;  Service:    • ENDOSCOPY      X2   • ENDOSCOPY N/A 1/3/2018    Procedure: ESOPHAGOGASTRODUODENOSCOPY WITH BIOPSIES;  Surgeon: Jan Brannon MD;  Location: [facility] ENDOSCOPY;  Service:    • STEREOTACTIC BIOPSY / ASPIRATION / EXCISION Left     Bx breast percutan needle core use imag guide   • THUMB ARTHROSCOPY      left thumb joint implant   • WISDOM TOOTH EXTRACTION         Social History     Socioeconomic History   • Marital status:    • Number of children: 2   Tobacco Use   • Smoking status: Former Smoker     Packs/day: 0.00     Years: 15.00     Pack years: 0.00     Types: Cigarettes     Quit date: 1990     Years since quittin.6   • Smokeless tobacco: Never Used   • Tobacco comment: Have not smoked in 30 years   Vaping Use   • Vaping Use: Never used   Substance and Sexual Activity   • Alcohol use: Yes     Alcohol/week: 7.0 standard drinks     Types: 7 Glasses of wine per week     Comment: vodka daily   • Drug use: No   • Sexual activity: Yes     Partners: Male     Birth control/protection: Post-menopausal       Family History   Problem Relation Age of Onset   • Heart disease Father         MI/CABG early 60s   • Hypertension Father    • Lung cancer Father    • Stroke Father    • GIACOMO disease Sister         esophageal reflux   • COPD Sister    • Hypertension Sister    • Esophageal cancer Mother    • Malig Hyperthermia Neg Hx        The following portion of the patient's history were 
"reviewed and updated as appropriate: past medical history, past surgical history, past social history, past family history, allergies, current medications, and problem list.    Review of Systems   Constitutional: Negative.   Cardiovascular: Positive for palpitations.   Respiratory: Negative.    Hematologic/Lymphatic: Negative.    Musculoskeletal: Positive for joint pain.   Neurological: Negative.        No Known Allergies      Current Outpatient Medications:   •  acetaminophen (TYLENOL) 325 MG tablet, Take 650 mg by mouth Every 6 (Six) Hours As Needed for mild pain (1-3)., Disp: , Rfl:   •  amLODIPine (NORVASC) 2.5 MG tablet, Take 1 tablet by mouth Daily., Disp: 90 tablet, Rfl: 3  •  cetirizine (zyrTEC) 10 MG tablet, Take 10 mg by mouth Daily., Disp: , Rfl:   •  cyclobenzaprine (FLEXERIL) 5 MG tablet, Take 1 tablet by mouth three times a day as needed abd wall pain, Disp: 30 tablet, Rfl: 3  •  magnesium gluconate (MAGONATE) 500 MG tablet, Take 400 mg by mouth Daily As Needed., Disp: , Rfl:   •  naproxen sodium (ALEVE) 220 MG tablet, Take 220 mg by mouth 2 (Two) Times a Day As Needed for mild pain (1-3)., Disp: , Rfl:   •  omeprazole-sodium bicarbonate (ZEGERID)  MG per capsule, Take 1 capsule by mouth Daily., Disp: 30 capsule, Rfl: 11  •  sucralfate (CARAFATE) 1 g tablet, Take 1 tablet by mouth 4 (Four) Times a Day. (Patient taking differently: Take 1 g by mouth As Needed.), Disp: 20 tablet, Rfl: 0  •  VITAMIN D, CHOLECALCIFEROL, PO, Take  by mouth Daily., Disp: , Rfl:         Objective:     Vitals:    12/03/21 1242 12/03/21 1251   BP: 120/72 120/68   BP Location: Left arm Right arm   Pulse: 85    Weight: 74.8 kg (165 lb)    Height: 162.6 cm (64\")      Body mass index is 28.32 kg/m².    PHYSICAL EXAM:    Vitals Reviewed.   General Appearance: No acute distress, well developed and well nourished.   Eyes: Conjunctiva and lids: No erythema, swelling, or discharge. Sclera non-icteric. Glasses.   HENT: Atraumatic, "
normocephalic. External eyes, ears, and nose normal. No hearing loss noted. Mucous membranes normal. Lips not cyanotic. Neck supple with no tenderness. Wearing mask.   Respiratory: No signs of respiratory distress. Respiration rhythm and depth normal.   Clear to auscultation. No rales, crackles, rhonchi, or wheezing auscultated.   Cardiovascular:  Jugular Venous Pressure: Normal  Heart Rate and Rhythm: Normal, Heart Sounds: Normal S1 and S2. No S3 or S4 noted.  Murmurs: No murmurs noted. No rubs, thrills, or gallops.   Arterial Pulses: Carotid pulses normal. No carotid bruit noted. Posterior tibialis and dorsalis pedis pulses normal.   Lower Extremities: No edema noted.  Gastrointestinal:  Abdomen soft, non-distended, non-tender.    Musculoskeletal: Normal movement of extremities.  Skin and Nails: General appearance normal. No pallor, cyanosis, diaphoresis. Skin temperature normal. No clubbing of fingernails.   Psychiatric: Patient alert and oriented to person, place, and time. Speech and behavior appropriate. Normal mood and affect.       ECG 12 Lead    Date/Time: 12/3/2021 12:41 PM  Performed by: Trini Delgado APRN  Authorized by: Trini Delgado APRN   Comparison: compared with previous ECG from 5/26/2021  Similar to previous ECG  Rhythm: sinus rhythm  Rate: normal  BPM: 85  Conduction: conduction normal  ST Segments: ST segments normal  T Waves: T waves normal  QRS axis: normal  Other findings: non-specific ST-T wave changes    Clinical impression: non-specific ECG              Assessment:       Diagnosis Plan   1. Chest pain, atypical     2. Palpitations     3. Essential hypertension            Plan:       1.  Atypical Chest Pain: Resolved with changes in her GI medication.  Stress echocardiogram was normal.    2.  Palpitations: She occasionally experiences a brief palpitation, but nothing sustained.  She takes magnesium as needed.  She does not want to start any other medication.    3.  Hypertension: 
Blood pressure well controlled.    4.  I recommended a 1 year follow-up visit with Dr. Gael Rodriguez.      As always, it has been a pleasure to participate in your patient's care. Thank you.       Sincerely,         SIMONE Fernandes  [facility]      · Dictated utilizing Dragon Dictation  · COVID-19 Precautions - Patient was compliant in wearing a mask. When I saw the patient, I used appropriate personal protective equipment (PPE) including mask and eye shield (standard procedure).  Additionally, I used gown and gloves if indicated.  Hand hygiene was completed before and after seeing the patient.  · I spent 30 minutes reviewing her medical records/testing/previous office notes/labs, face-to-face interaction with patient, physical examination, formulating the plan of care, and discussion of plan of care with patient.     
Except as otherwise indicated in HPI:  CONSTITUTIONAL: Neg  HEENT: neg  CV: neg  Resp: neg  GI: Neg  : Neg  SKIN: Neg  NEURO: Neg  PSYCHIATRIC: Neg  Heme/Onc: Neg

## 2025-05-25 ENCOUNTER — INPATIENT (INPATIENT)
Facility: HOSPITAL | Age: 89
LOS: 9 days | Discharge: HOME | DRG: 392 | End: 2025-06-04
Attending: FAMILY MEDICINE | Admitting: INTERNAL MEDICINE
Payer: MEDICARE

## 2025-05-25 VITALS
OXYGEN SATURATION: 95 % | RESPIRATION RATE: 18 BRPM | SYSTOLIC BLOOD PRESSURE: 72 MMHG | WEIGHT: 89.95 LBS | HEIGHT: 65 IN | HEART RATE: 115 BPM | TEMPERATURE: 98 F | DIASTOLIC BLOOD PRESSURE: 40 MMHG

## 2025-05-25 DIAGNOSIS — Z29.9 ENCOUNTER FOR PROPHYLACTIC MEASURES, UNSPECIFIED: ICD-10-CM

## 2025-05-25 DIAGNOSIS — K92.2 GASTROINTESTINAL HEMORRHAGE, UNSPECIFIED: ICD-10-CM

## 2025-05-25 DIAGNOSIS — K22.89 OTHER SPECIFIED DISEASE OF ESOPHAGUS: ICD-10-CM

## 2025-05-25 LAB
ALBUMIN SERPL ELPH-MCNC: 3.1 G/DL — LOW (ref 3.3–5)
ALP SERPL-CCNC: 70 U/L — SIGNIFICANT CHANGE UP (ref 40–120)
ALT FLD-CCNC: 15 U/L — SIGNIFICANT CHANGE UP (ref 12–78)
ANION GAP SERPL CALC-SCNC: 12 MMOL/L — SIGNIFICANT CHANGE UP (ref 5–17)
APTT BLD: 21 SEC — LOW (ref 26.1–36.8)
AST SERPL-CCNC: 16 U/L — SIGNIFICANT CHANGE UP (ref 15–37)
BASOPHILS # BLD AUTO: 0.03 K/UL — SIGNIFICANT CHANGE UP (ref 0–0.2)
BASOPHILS NFR BLD AUTO: 0.2 % — SIGNIFICANT CHANGE UP (ref 0–2)
BILIRUB SERPL-MCNC: 0.4 MG/DL — SIGNIFICANT CHANGE UP (ref 0.2–1.2)
BUN SERPL-MCNC: 48 MG/DL — HIGH (ref 7–23)
CALCIUM SERPL-MCNC: 8.6 MG/DL — SIGNIFICANT CHANGE UP (ref 8.5–10.1)
CHLORIDE SERPL-SCNC: 103 MMOL/L — SIGNIFICANT CHANGE UP (ref 96–108)
CO2 SERPL-SCNC: 27 MMOL/L — SIGNIFICANT CHANGE UP (ref 22–31)
CREAT SERPL-MCNC: 1.2 MG/DL — SIGNIFICANT CHANGE UP (ref 0.5–1.3)
EGFR: 43 ML/MIN/1.73M2 — LOW
EGFR: 43 ML/MIN/1.73M2 — LOW
EOSINOPHIL # BLD AUTO: 0 K/UL — SIGNIFICANT CHANGE UP (ref 0–0.5)
EOSINOPHIL NFR BLD AUTO: 0 % — SIGNIFICANT CHANGE UP (ref 0–6)
FLUAV AG NPH QL: SIGNIFICANT CHANGE UP
FLUBV AG NPH QL: SIGNIFICANT CHANGE UP
GLUCOSE SERPL-MCNC: 242 MG/DL — HIGH (ref 70–99)
HCT VFR BLD CALC: 32 % — LOW (ref 34.5–45)
HGB BLD-MCNC: 10.5 G/DL — LOW (ref 11.5–15.5)
IMM GRANULOCYTES NFR BLD AUTO: 0.8 % — SIGNIFICANT CHANGE UP (ref 0–0.9)
INR BLD: 1.09 RATIO — SIGNIFICANT CHANGE UP (ref 0.85–1.16)
LACTATE SERPL-SCNC: 4.3 MMOL/L — CRITICAL HIGH (ref 0.7–2)
LACTATE SERPL-SCNC: 5.4 MMOL/L — CRITICAL HIGH (ref 0.7–2)
LYMPHOCYTES # BLD AUTO: 1.48 K/UL — SIGNIFICANT CHANGE UP (ref 1–3.3)
LYMPHOCYTES # BLD AUTO: 7.7 % — LOW (ref 13–44)
MCHC RBC-ENTMCNC: 32.8 G/DL — SIGNIFICANT CHANGE UP (ref 32–36)
MCHC RBC-ENTMCNC: 35.6 PG — HIGH (ref 27–34)
MCV RBC AUTO: 108.5 FL — HIGH (ref 80–100)
MONOCYTES # BLD AUTO: 1.27 K/UL — HIGH (ref 0–0.9)
MONOCYTES NFR BLD AUTO: 6.6 % — SIGNIFICANT CHANGE UP (ref 2–14)
NEUTROPHILS # BLD AUTO: 16.25 K/UL — HIGH (ref 1.8–7.4)
NEUTROPHILS NFR BLD AUTO: 84.7 % — HIGH (ref 43–77)
NRBC BLD AUTO-RTO: 0 /100 WBCS — SIGNIFICANT CHANGE UP (ref 0–0)
PLATELET # BLD AUTO: 267 K/UL — SIGNIFICANT CHANGE UP (ref 150–400)
POTASSIUM SERPL-MCNC: 3.9 MMOL/L — SIGNIFICANT CHANGE UP (ref 3.5–5.3)
POTASSIUM SERPL-SCNC: 3.9 MMOL/L — SIGNIFICANT CHANGE UP (ref 3.5–5.3)
PROT SERPL-MCNC: 6.2 G/DL — SIGNIFICANT CHANGE UP (ref 6–8.3)
PROTHROM AB SERPL-ACNC: 12.7 SEC — SIGNIFICANT CHANGE UP (ref 9.9–13.4)
RBC # BLD: 2.95 M/UL — LOW (ref 3.8–5.2)
RBC # FLD: 12 % — SIGNIFICANT CHANGE UP (ref 10.3–14.5)
RSV RNA NPH QL NAA+NON-PROBE: SIGNIFICANT CHANGE UP
SARS-COV-2 RNA SPEC QL NAA+PROBE: SIGNIFICANT CHANGE UP
SODIUM SERPL-SCNC: 142 MMOL/L — SIGNIFICANT CHANGE UP (ref 135–145)
SOURCE RESPIRATORY: SIGNIFICANT CHANGE UP
WBC # BLD: 19.19 K/UL — HIGH (ref 3.8–10.5)
WBC # FLD AUTO: 19.19 K/UL — HIGH (ref 3.8–10.5)

## 2025-05-25 PROCEDURE — 71045 X-RAY EXAM CHEST 1 VIEW: CPT | Mod: 26

## 2025-05-25 PROCEDURE — 99285 EMERGENCY DEPT VISIT HI MDM: CPT | Mod: FS

## 2025-05-25 PROCEDURE — 99223 1ST HOSP IP/OBS HIGH 75: CPT | Mod: GC

## 2025-05-25 PROCEDURE — 74177 CT ABD & PELVIS W/CONTRAST: CPT | Mod: 26

## 2025-05-25 PROCEDURE — 71260 CT THORAX DX C+: CPT | Mod: 26

## 2025-05-25 RX ORDER — SODIUM CHLORIDE 9 G/1000ML
1000 INJECTION, SOLUTION INTRAVENOUS
Refills: 0 | Status: DISCONTINUED | OUTPATIENT
Start: 2025-05-25 | End: 2025-05-27

## 2025-05-25 RX ORDER — PIPERACILLIN-TAZO-DEXTROSE,ISO 3.375G/5
3.38 IV SOLUTION, PIGGYBACK PREMIX FROZEN(ML) INTRAVENOUS ONCE
Refills: 0 | Status: COMPLETED | OUTPATIENT
Start: 2025-05-25 | End: 2025-05-25

## 2025-05-25 RX ORDER — DEXTROSE 50 % IN WATER 50 %
25 SYRINGE (ML) INTRAVENOUS ONCE
Refills: 0 | Status: DISCONTINUED | OUTPATIENT
Start: 2025-05-25 | End: 2025-05-27

## 2025-05-25 RX ORDER — DEXTROSE 50 % IN WATER 50 %
15 SYRINGE (ML) INTRAVENOUS ONCE
Refills: 0 | Status: DISCONTINUED | OUTPATIENT
Start: 2025-05-25 | End: 2025-05-27

## 2025-05-25 RX ORDER — ONDANSETRON HCL/PF 4 MG/2 ML
4 VIAL (ML) INJECTION ONCE
Refills: 0 | Status: COMPLETED | OUTPATIENT
Start: 2025-05-25 | End: 2025-05-25

## 2025-05-25 RX ORDER — GLUCAGON 3 MG/1
1 POWDER NASAL ONCE
Refills: 0 | Status: DISCONTINUED | OUTPATIENT
Start: 2025-05-25 | End: 2025-05-27

## 2025-05-25 RX ORDER — ASPIRIN 325 MG
0 TABLET ORAL
Refills: 0 | DISCHARGE

## 2025-05-25 RX ORDER — PHENYLEPHRINE HCL IN 0.9% NACL 0.5 MG/5ML
0.1 SYRINGE (ML) INTRAVENOUS
Qty: 40 | Refills: 0 | Status: DISCONTINUED | OUTPATIENT
Start: 2025-05-25 | End: 2025-05-26

## 2025-05-25 RX ORDER — DEXTROSE 50 % IN WATER 50 %
12.5 SYRINGE (ML) INTRAVENOUS ONCE
Refills: 0 | Status: DISCONTINUED | OUTPATIENT
Start: 2025-05-25 | End: 2025-05-27

## 2025-05-25 RX ORDER — SODIUM CHLORIDE 9 G/1000ML
1000 INJECTION, SOLUTION INTRAVENOUS
Refills: 0 | Status: DISCONTINUED | OUTPATIENT
Start: 2025-05-25 | End: 2025-05-26

## 2025-05-25 RX ORDER — INSULIN LISPRO 100 U/ML
INJECTION, SOLUTION INTRAVENOUS; SUBCUTANEOUS EVERY 6 HOURS
Refills: 0 | Status: DISCONTINUED | OUTPATIENT
Start: 2025-05-25 | End: 2025-05-26

## 2025-05-25 RX ADMIN — Medication 1000 MILLILITER(S): at 12:52

## 2025-05-25 RX ADMIN — Medication 1750 MILLILITER(S): at 10:31

## 2025-05-25 RX ADMIN — Medication 10 MG/HR: at 18:05

## 2025-05-25 RX ADMIN — Medication 40 MILLIGRAM(S): at 11:08

## 2025-05-25 RX ADMIN — INSULIN LISPRO 1: 100 INJECTION, SOLUTION INTRAVENOUS; SUBCUTANEOUS at 23:50

## 2025-05-25 RX ADMIN — Medication 1.53 MICROGRAM(S)/KG/MIN: at 14:42

## 2025-05-25 RX ADMIN — SODIUM CHLORIDE 100 MILLILITER(S): 9 INJECTION, SOLUTION INTRAVENOUS at 23:17

## 2025-05-25 RX ADMIN — Medication 4 MILLIGRAM(S): at 11:56

## 2025-05-25 RX ADMIN — Medication 2000 MILLILITER(S): at 23:18

## 2025-05-25 RX ADMIN — Medication 4 MILLIGRAM(S): at 23:28

## 2025-05-25 RX ADMIN — Medication 200 GRAM(S): at 10:31

## 2025-05-25 NOTE — ED ADULT NURSE REASSESSMENT NOTE - NS ED NURSE REASSESS COMMENT FT1
ICU MD [place] at bedside, pt hypotensive after iv fluids completed. iv blood transfusing without complications, iv sites WNL. 200mcg of phenylephrine iv pushed by MD at bedside at 1440. vitals as documented. continuous drip started and titrated as documented. safety measures maintained, side rails up x2
blood transfusion initiated with 2 RN verification at bedside. pt tolerated well, vitals as documented. rate increased to 150cc/hr to complete unit as per PA verbal order in 2 hours. iv site WNL. side rails up x2. ICU consult at bedside
platelet transfusion completed without complications, iv site WNL. iv flushing well. iv pressor infusing well, side WNL. vitals and titration as documented. maintained on monitor and 2L NC, tolerating well. side rails up x2, visitors at bedside
pt hypotensive again, vitals as documented. PA made aware. pt remains A&OX4 but increased fatigue, RR even and unlabored. iv fluids infusing well without complications, iv site WNL. ct scan aware to page for results. awaiting GI consult
pt vomited bright red blood again, approx 200cc. pt cleaned, linens changed. medicated as per orders. iv fluids completed, iv sites WNL. pt transported to ct scan via stretcher with RN at bedside, pt connected to portable cardiac monitor for transport. pt safely returned back to room 2 in stable condition, side rails up x2. visitor at bedside
pt vomited bright red blood, approx 100cc, + blood clot noted. PA and MD made aware and at bedside. pt cleaned and linens changed. + stool in diaper, pt cleaned and new diaper applied. primafit in place, suction applied. additional blood work drawn and sent to lab. medicated as per orders. vitals as documented. side rails up x2
pt with large BM, + blood clots noted. ICU NP made aware. pt cleaned, linens changed, new diaper applied. platelet transfusion initiated, pt tolerated well, rate increased to 150cc/hr. iv site WNL. safety measures maintained, side rails up x2. visitor at bedside
right forearm 20G iv infiltrated, iv removed, gauze dressing in place. new 20G iv placed to right wrist. iv med infusing well without complications, iv site WNL. pt cleaned, linens changed, blankets provided. oral care provided with mouth swab. safety measures maintained, side rails up x2. son and visitors at bedside. awaiting ICU bed placement
pt transfer to ICU report given to RN

## 2025-05-25 NOTE — H&P ADULT - ASSESSMENT
89-year-old female with past medical history of breast cancer with bilateral mastectomy presents today due to vomiting and diarrhea found to have GIB requiring admission to ICU.

## 2025-05-25 NOTE — H&P ADULT - NSICDXPASTMEDICALHX_GEN_ALL_CORE_FT
Quality 111:Pneumonia Vaccination Status For Older Adults: Pneumococcal Vaccination not Administered or Previously Received, Reason not Otherwise Specified Detail Level: Detailed PAST MEDICAL HISTORY:  Hypertension      Quality 110: Preventive Care And Screening: Influenza Immunization: Influenza immunization was not ordered or administered, reason not given

## 2025-05-25 NOTE — ED PROVIDER NOTE - PHYSICAL EXAMINATION
Constitutional: Awake, Alert, non-toxic. NAD. Well appearing, well nourished.   HEAD: Normocephalic, atraumatic.   EYES: EOM intact, conjunctiva and sclera are clear bilaterally.   ENT: No rhinorrhea, patent, mucous membranes pink/moist, no drooling or stridor.   NECK: Supple, non-tender  CARDIOVASCULAR: Normal S1, S2; regular rate and rhythm.  RESPIRATORY: Normal respiratory effort; breath sounds CTAB, no wheezes, rhonchi, or rales. Speaking in full sentences. No accessory muscle use.   ABDOMEN: Soft; (+) slight vague epigastric abdominal TTP. no guarding or rebound TTP.   EXTREMITIES: Full passive and active ROM in all extremities; non-tender to palpation; distal pulses palpable and symmetric  SKIN: Warm, dry; good skin turgor, no apparent lesions or rashes, no ecchymosis, brisk capillary refill.  NEURO: A&O x3. Sensory and motor functions are grossly intact. Speech is normal. Appearance and judgement seem appropriate for gender and age.

## 2025-05-25 NOTE — ED PROVIDER NOTE - DIFFERENTIAL DIAGNOSIS
Differential Diagnosis Rule out gastroenteritis rule out gastritis rule out ulcer disease either duodenal or gastric

## 2025-05-25 NOTE — PROGRESS NOTE ADULT - ASSESSMENT
Impresssion:  1. hematemesis  2. acute UGIB  3. acute blood loss anemia  4. hypovolemic shock  5. hyperglycemia    Plan:  Neuro - nonfocal, avoid neurosuppressants    CV -  actively titrating pressors to keep MAP>65          lactate elevated prior, repeat now          hypovolemic shock with active GIB          POCUS - grossly nml LV/RV fxn, IVC 1.43          500ml bolus IVF now, start LR @ 100ml/hr          sp 1U pRBC and plts, will give additional unit empirically now          Echo in am    Pulm -  stable on NC, nonlabored              CXR and CT no acute lung pathology    GI -  PPI infusion         NPO         serial CBCs         tx for Hbg<8 or active signs of bleeding         GI consulted, family electing for now EGD unless absolutely emergent    Renal - Cr stable             strict I/Os             IV hydration with balanced fluids             repeat BMP now     Heme -  no chem DVT ppx given acute bleeding               SCDs only               serial CBCs [id number], tx for Hbg<8 or signs of active bleeding               coags normal    ID - afebrile, WBC elevated likely reactive         BCx pending         U/a pending         CT no signs of active infectious source         received Zosyn x 1 in ED         will observe off abx for now    Endo -  BS >200, no hx DM              check A1c              FSG [id number] with ISS coverage    GOC: DNR/DNI

## 2025-05-25 NOTE — ED PROVIDER NOTE - OBJECTIVE STATEMENT
89-year-old female with past medical history of breast cancer with bilateral mastectomy presents today due to vomiting and diarrhea.  Patient is with aide at the bedside who reports numerous episodes of vomiting.  No prior abdominal surgery.  Patient has also been having intermittent small soft stools.  Patient takes Tylenol every morning.  No known fever currently.  Patient denies chest pain, shortness of breath, cough, known sick contacts, recent travel, recent antibiotics, or any other complaints.

## 2025-05-25 NOTE — PATIENT PROFILE ADULT - FALL HARM RISK - HARM RISK INTERVENTIONS

## 2025-05-25 NOTE — ED PROVIDER NOTE - CLINICAL SUMMARY MEDICAL DECISION MAKING FREE TEXT BOX
Patient is a 89-year-old white female with history of hypertension as well as breast cancer with bilateral mastectomy presenting here with vomiting and diarrhea.  Patient apparently had numerous episodes of vomiting and diarrhea over the past few days and then upon arrival in the emergency department here she began to complain of epigastric abdominal pain followed by 1 episode of vomiting bright red blood approximately 100 cc.  No additional information is available reliably at this time.  Case require a thorough evaluation to be performed in an individual basis independently performed followed by labs cultures imaging IV fluids and medications.

## 2025-05-25 NOTE — H&P ADULT - NSHPPHYSICALEXAM_GEN_ALL_CORE
Vital Signs Last 24 Hrs  T(C): 36.3 (25 May 2025 16:00), Max: 36.9 (25 May 2025 10:35)  T(F): 97.3 (25 May 2025 16:00), Max: 98.4 (25 May 2025 10:35)  HR: 91 (25 May 2025 16:00) (70 - 115)  BP: 77/49 (25 May 2025 16:00) (60/48 - 117/59)  BP(mean): 58 (25 May 2025 16:00) (47 - 99)  RR: 23 (25 May 2025 16:00) (16 - 23)  SpO2: 100% (25 May 2025 16:00) (95% - 100%)    Parameters below as of 25 May 2025 16:00  Patient On (Oxygen Delivery Method): nasal cannula  O2 Flow (L/min): 2      CONSTITUTIONAL: awake, alert, no acute distress  HEENT: Atraumatic normocephalic. Moist mucous membranes.  No conjunctival injection.  RESP: No respiratory distress; CTA b/l, no WRR  CV: RRR, +S1S2, no MRG  GI: Bowel sounds present. Soft, tender to palpation, distended, no rebound or guarding  MSK: Moving all four extremities spontaneously. No peripheral edema. No calf tenderness.  SKIN: Warm and dry. No rashes noted.  NEURO: AOx3, answering questions and following commands appropriately  PSYCH: Mood and affect appropriate, recent memory intact

## 2025-05-25 NOTE — ED CLERICAL - NS ED CLERK NOTE PRE-ARRIVAL INFORMATION; ADDITIONAL PRE-ARRIVAL INFORMATION

## 2025-05-25 NOTE — ED PROVIDER NOTE - PROGRESS NOTE DETAILS
Pt vomited BRB twice. Protonix ordered, Zofran ordered. GI paged and made aware. CT advised to perform CT GI bleed immediately. vitals stable. no significant anemia on labs.

## 2025-05-25 NOTE — PROGRESS NOTE ADULT - SUBJECTIVE AND OBJECTIVE BOX
Patient is a 89y old  Female who presents with a chief complaint of GIB (25 May 2025 16:02)      BRIEF HOSPITAL COURSE:  89F with PMHx HTN, breast CA sp mastectomy who presented to ED complaining of hematemesis and diarrhea. Pt reportedly on ASA. In ED noted to be hypotensive received IVF without improvement. Hbg 10.5, lactate >4, CTA showing acute foci of active GI bleeding at GE junction. Received 1U pRBC and 1u Plts while in ED. Started on pressors for ongoing hypotension. Admitted to ICU.     Events last 24 hours: Received from ED, no further hematemesis on report from ED tonight, remains on pressors, tachycardic, afebrile. Denies CP, SOB, abd pain.    PAST MEDICAL & SURGICAL HISTORY:  Hypertension  breast CA  HH  bilateral mastectomy        Review of Systems:  12 pt ROS negative unless otherwise stated      Medications:    phenylephrine    Infusion 0.1 MICROgram(s)/kG/Min IV Continuous <Continuous>            pantoprazole Infusion 8 mG/Hr IV Continuous <Continuous>        lactated ringers. 1000 milliLiter(s) IV Continuous <Continuous>  sodium chloride 0.9% Bolus 500 milliLiter(s) IV Bolus once      chlorhexidine 2% Cloths 1 Application(s) Topical <User Schedule>            ICU Vital Signs Last 24 Hrs  T(C): 36.3 (25 May 2025 16:25), Max: 36.9 (25 May 2025 10:35)  T(F): 97.3 (25 May 2025 16:25), Max: 98.4 (25 May 2025 10:35)  HR: 104 (25 May 2025 22:12) (70 - 115)  BP: 102/60 (25 May 2025 22:12) (60/48 - 136/104)  BP(mean): 70 (25 May 2025 18:49) (47 - 114)  ABP: --  ABP(mean): --  RR: 16 (25 May 2025 20:41) (16 - 23)  SpO2: 100% (25 May 2025 20:41) (95% - 100%)    O2 Parameters below as of 25 May 2025 19:49  Patient On (Oxygen Delivery Method): nasal cannula  O2 Flow (L/min): 2        I&O's Summary              LABS:                        10.5   19.19 )-----------( 267      ( 25 May 2025 10:20 )             32.0     05-25    142  |  103  |  48[H]  ----------------------------<  242[H]  3.9   |  27  |  1.20    Ca    8.6      25 May 2025 10:20    TPro  6.2  /  Alb  3.1[L]  /  TBili  0.4  /  DBili  x   /  AST  16  /  ALT  15  /  AlkPhos  70  05-25          CAPILLARY BLOOD GLUCOSE      POCT Blood Glucose.: 257 mg/dL (25 May 2025 10:03)    PT/INR - ( 25 May 2025 10:20 )   PT: 12.7 sec;   INR: 1.09 ratio         PTT - ( 25 May 2025 10:20 )  PTT:21.0 sec  Urinalysis Basic - ( 25 May 2025 10:20 )    Color: x / Appearance: x / SG: x / pH: x  Gluc: 242 mg/dL / Ketone: x  / Bili: x / Urobili: x   Blood: x / Protein: x / Nitrite: x   Leuk Esterase: x / RBC: x / WBC x   Sq Epi: x / Non Sq Epi: x / Bacteria: x      CULTURES: pending    Physical Examination:    General: No acute distress.  Alert, oriented x3, interactive, nonfocal    HEENT: Pupils equal, reactive to light.  Symmetric.    PULM: Clear to auscultation bilaterally    CVS: Regular rate and rhythm, no murmurs, rubs, or gallops    ABD: Soft, nondistended, nontender, normoactive bowel sounds, no masses    EXT: No edema, nontender    SKIN: Warm and well perfused, no rashes noted.    RADIOLOGY:   ACC: [id number] EXAM:  CT ABDOMEN AND PELVIS IC   ORDERED BY: DANNY MCCALLUM     ACC: [id number] EXAM:  CT CHEST IC   ORDERED BY: DANNY MCCALLUM     PROCEDURE DATE:  05/25/2025          INTERPRETATION:  CLINICAL INFORMATION: Hematemesis and abdominal pain.    COMPARISON: None.    CONTRAST/COMPLICATIONS:  IV Contrast: Omnipaque 350 (accession [id number]), IV contrast documented   in unlinked concurrent exam (accession [id number])  90 cc administered   10   cc discarded  Oral Contrast: NONE  .    PROCEDURE:  CT of the Chest, Abdomen and Pelvis was performed.  Sagittal and coronal reformats were performed.    FINDINGS:  CHEST:  LUNGS AND LARGE AIRWAYS: Patent central airways. No pulmonary nodules.   Left basilar atelectasis.  PLEURA: No pleural effusion.  VESSELS: Aortic calcifications. Coronary artery calcifications.  HEART: Heart size is normal. No pericardial effusion.  MEDIASTINUM AND JEANNIE: Moderate to large paraesophageal hiatal hernia.   Curvilinear arterial enhancement seen in the distal   esophagus/gastroesophageal junction (series 303, images 15 through 21),   with increased intraluminal filling of contrast on the delayed phase   images (series 306-42). Moderate amount of complex fluid in the esophagus.  CHEST WALL AND LOWER NECK: Within normal limits.    ABDOMEN AND PELVIS:  LIVER: Within normal limits.  BILE DUCTS: Normal caliber.  GALLBLADDER: Cholelithiasis.  SPLEEN: Within normal limits.  PANCREAS: Within normal limits.  ADRENALS: Within normal limits.  KIDNEYS/URETERS: No hydronephrosis or obstructive renal calculi. Right   renal cyst. Additional subcentimeter cortical hypodensities are too small   to characterize.    BLADDER: Decompressed and cannot be evaluated.  REPRODUCTIVE ORGANS: Calcified uterine fibroids. No suspicious adnexal   mass.    BOWEL: No bowel obstruction. Appendix is not visualized. No evidence of   inflammation in the pericecal region. Colonic diverticulosis without   evidence of acute diverticulitis. Esophagus and stomach is distended with   complex fluid. Heterogeneous debris within the stomach.  PERITONEUM/RETROPERITONEUM: Within normal limits.  VESSELS: Atherosclerotic changes.  LYMPH NODES: No lymphadenopathy.  ABDOMINAL WALL: Within normal limits.  BONES: Marked thoracolumbar levoscoliosis. Multilevel degenerative   changes.    IMPRESSION:  Foci of active GI bleeding in the lower esophagus/gastroesophageal   junction, with intraluminal pooling of contrast on the delayed phase   images.    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 Gastric distention with   complex fluid and heterogeneous debris may represent blood products.    Findings were discussed with Dr. Mccallum on 5/25/2025 1:14 PM by Dr. Partida with read back confirmation.    --- End of Report ---             SUSAN PARTIDA MD; Attending Radiologist  This document has been electronically signed. May 25 2025  1:18PM    CRITICAL CARE TIME SPENT: 38 mins assessing presenting problems of acute illness that poses high probability of life threatening deterioration or end organ damage/dysfunction.  Medical decision making including Initiating plan of care, reviewing data, reviewing radiology, direct patient bedside evaluation and interpretation of vital signs,  discussion with multidisciplinary team, all non inclusive of procedures. Date of entry of note is equal to date of entry of services rendered.    Patient is a 89y old  Female who presents with a chief complaint of GIB (25 May 2025 16:02)      BRIEF HOSPITAL COURSE:  89F home hospice pt with PMHx HTN, breast CA sp mastectomy who presented to ED complaining of hematemesis and diarrhea. Pt reportedly on ASA. In ED noted to be hypotensive received IVF without improvement. Hbg 10.5, lactate >4, CTA showing acute foci of active GI bleeding at GE junction. Received 1U pRBC and 1u Plts while in ED. Started on pressors for ongoing hypotension. Admitted to ICU.     Events last 24 hours: Received from ED, no further hematemesis on report from ED tonight, remains on pressors, tachycardic, afebrile. Denies CP, SOB, abd pain.    PAST MEDICAL & SURGICAL HISTORY:  Hypertension  breast CA  HH  bilateral mastectomy        Review of Systems:  12 pt ROS negative unless otherwise stated      Medications:    phenylephrine    Infusion 0.1 MICROgram(s)/kG/Min IV Continuous <Continuous>            pantoprazole Infusion 8 mG/Hr IV Continuous <Continuous>        lactated ringers. 1000 milliLiter(s) IV Continuous <Continuous>  sodium chloride 0.9% Bolus 500 milliLiter(s) IV Bolus once      chlorhexidine 2% Cloths 1 Application(s) Topical <User Schedule>            ICU Vital Signs Last 24 Hrs  T(C): 36.3 (25 May 2025 16:25), Max: 36.9 (25 May 2025 10:35)  T(F): 97.3 (25 May 2025 16:25), Max: 98.4 (25 May 2025 10:35)  HR: 104 (25 May 2025 22:12) (70 - 115)  BP: 102/60 (25 May 2025 22:12) (60/48 - 136/104)  BP(mean): 70 (25 May 2025 18:49) (47 - 114)  ABP: --  ABP(mean): --  RR: 16 (25 May 2025 20:41) (16 - 23)  SpO2: 100% (25 May 2025 20:41) (95% - 100%)    O2 Parameters below as of 25 May 2025 19:49  Patient On (Oxygen Delivery Method): nasal cannula  O2 Flow (L/min): 2        I&O's Summary              LABS:                        10.5   19.19 )-----------( 267      ( 25 May 2025 10:20 )             32.0     05-25    142  |  103  |  48[H]  ----------------------------<  242[H]  3.9   |  27  |  1.20    Ca    8.6      25 May 2025 10:20    TPro  6.2  /  Alb  3.1[L]  /  TBili  0.4  /  DBili  x   /  AST  16  /  ALT  15  /  AlkPhos  70  05-25          CAPILLARY BLOOD GLUCOSE      POCT Blood Glucose.: 257 mg/dL (25 May 2025 10:03)    PT/INR - ( 25 May 2025 10:20 )   PT: 12.7 sec;   INR: 1.09 ratio         PTT - ( 25 May 2025 10:20 )  PTT:21.0 sec  Urinalysis Basic - ( 25 May 2025 10:20 )    Color: x / Appearance: x / SG: x / pH: x  Gluc: 242 mg/dL / Ketone: x  / Bili: x / Urobili: x   Blood: x / Protein: x / Nitrite: x   Leuk Esterase: x / RBC: x / WBC x   Sq Epi: x / Non Sq Epi: x / Bacteria: x      CULTURES: pending    Physical Examination:    General: No acute distress.  Alert, oriented x3, interactive, nonfocal    HEENT: Pupils equal, reactive to light.  Symmetric.    PULM: Clear to auscultation bilaterally    CVS: Regular rate and rhythm, no murmurs, rubs, or gallops    ABD: Soft, nondistended, nontender, normoactive bowel sounds, no masses    EXT: No edema, nontender    SKIN: Warm and well perfused, no rashes noted.    RADIOLOGY:   ACC: [id number] EXAM:  CT ABDOMEN AND PELVIS IC   ORDERED BY: DANNY MCCALLUM     ACC: [id number] EXAM:  CT CHEST IC   ORDERED BY: DANNY MCCALLUM     PROCEDURE DATE:  05/25/2025          INTERPRETATION:  CLINICAL INFORMATION: Hematemesis and abdominal pain.    COMPARISON: None.    CONTRAST/COMPLICATIONS:  IV Contrast: Omnipaque 350 (accession [id number]), IV contrast documented   in unlinked concurrent exam (accession [id number])  90 cc administered   10   cc discarded  Oral Contrast: NONE  .    PROCEDURE:  CT of the Chest, Abdomen and Pelvis was performed.  Sagittal and coronal reformats were performed.    FINDINGS:  CHEST:  LUNGS AND LARGE AIRWAYS: Patent central airways. No pulmonary nodules.   Left basilar atelectasis.  PLEURA: No pleural effusion.  VESSELS: Aortic calcifications. Coronary artery calcifications.  HEART: Heart size is normal. No pericardial effusion.  MEDIASTINUM AND JEANNIE: Moderate to large paraesophageal hiatal hernia.   Curvilinear arterial enhancement seen in the distal   esophagus/gastroesophageal junction (series 303, images 15 through 21),   with increased intraluminal filling of contrast on the delayed phase   images (series 306-42). Moderate amount of complex fluid in the esophagus.  CHEST WALL AND LOWER NECK: Within normal limits.    ABDOMEN AND PELVIS:  LIVER: Within normal limits.  BILE DUCTS: Normal caliber.  GALLBLADDER: Cholelithiasis.  SPLEEN: Within normal limits.  PANCREAS: Within normal limits.  ADRENALS: Within normal limits.  KIDNEYS/URETERS: No hydronephrosis or obstructive renal calculi. Right   renal cyst. Additional subcentimeter cortical hypodensities are too small   to characterize.    BLADDER: Decompressed and cannot be evaluated.  REPRODUCTIVE ORGANS: Calcified uterine fibroids. No suspicious adnexal   mass.    BOWEL: No bowel obstruction. Appendix is not visualized. No evidence of   inflammation in the pericecal region. Colonic diverticulosis without   evidence of acute diverticulitis. Esophagus and stomach is distended with   complex fluid. Heterogeneous debris within the stomach.  PERITONEUM/RETROPERITONEUM: Within normal limits.  VESSELS: Atherosclerotic changes.  LYMPH NODES: No lymphadenopathy.  ABDOMINAL WALL: Within normal limits.  BONES: Marked thoracolumbar levoscoliosis. Multilevel degenerative   changes.    IMPRESSION:  Foci of active GI bleeding in the lower esophagus/gastroesophageal   junction, with intraluminal pooling of contrast on the delayed phase   images.    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 Gastric distention with   complex fluid and heterogeneous debris may represent blood products.    Findings were discussed with Dr. Mccallum on 5/25/2025 1:14 PM by Dr. Partida with read back confirmation.    --- End of Report ---             SUSAN PARTIDA MD; Attending Radiologist  This document has been electronically signed. May 25 2025  1:18PM    CRITICAL CARE TIME SPENT: 38 mins assessing presenting problems of acute illness that poses high probability of life threatening deterioration or end organ damage/dysfunction.  Medical decision making including Initiating plan of care, reviewing data, reviewing radiology, direct patient bedside evaluation and interpretation of vital signs,  discussion with multidisciplinary team, all non inclusive of procedures. Date of entry of note is equal to date of entry of services rendered.    Patient is a 89y old  Female who presents with a chief complaint of GIB (25 May 2025 16:02)      BRIEF HOSPITAL COURSE:  89F home hospice pt with PMHx HTN, breast CA sp mastectomy who presented to ED complaining of hematemesis and diarrhea. Pt reportedly on ASA. In ED noted to be hypotensive received IVF without improvement. Hbg 10.5, lactate >4, CTA showing acute foci of active GI bleeding at GE junction. Received 1U pRBC and 1u Plts while in ED. Started on pressors for ongoing hypotension. Admitted to ICU.     Events last 24 hours: Received from ED, intermittent small episodes of hematemesis and +bloody BM on report from ED tonight, remains on pressors, tachycardic, afebrile. Denies CP, SOB, abd pain.    PAST MEDICAL & SURGICAL HISTORY:  Hypertension  breast CA  HH  bilateral mastectomy        Review of Systems:  12 pt ROS negative unless otherwise stated      Medications:    phenylephrine    Infusion 0.1 MICROgram(s)/kG/Min IV Continuous <Continuous>            pantoprazole Infusion 8 mG/Hr IV Continuous <Continuous>        lactated ringers. 1000 milliLiter(s) IV Continuous <Continuous>  sodium chloride 0.9% Bolus 500 milliLiter(s) IV Bolus once      chlorhexidine 2% Cloths 1 Application(s) Topical <User Schedule>            ICU Vital Signs Last 24 Hrs  T(C): 36.3 (25 May 2025 16:25), Max: 36.9 (25 May 2025 10:35)  T(F): 97.3 (25 May 2025 16:25), Max: 98.4 (25 May 2025 10:35)  HR: 104 (25 May 2025 22:12) (70 - 115)  BP: 102/60 (25 May 2025 22:12) (60/48 - 136/104)  BP(mean): 70 (25 May 2025 18:49) (47 - 114)  ABP: --  ABP(mean): --  RR: 16 (25 May 2025 20:41) (16 - 23)  SpO2: 100% (25 May 2025 20:41) (95% - 100%)    O2 Parameters below as of 25 May 2025 19:49  Patient On (Oxygen Delivery Method): nasal cannula  O2 Flow (L/min): 2        I&O's Summary              LABS:                        10.5   19.19 )-----------( 267      ( 25 May 2025 10:20 )             32.0     05-25    142  |  103  |  48[H]  ----------------------------<  242[H]  3.9   |  27  |  1.20    Ca    8.6      25 May 2025 10:20    TPro  6.2  /  Alb  3.1[L]  /  TBili  0.4  /  DBili  x   /  AST  16  /  ALT  15  /  AlkPhos  70  05-25          CAPILLARY BLOOD GLUCOSE      POCT Blood Glucose.: 257 mg/dL (25 May 2025 10:03)    PT/INR - ( 25 May 2025 10:20 )   PT: 12.7 sec;   INR: 1.09 ratio         PTT - ( 25 May 2025 10:20 )  PTT:21.0 sec  Urinalysis Basic - ( 25 May 2025 10:20 )    Color: x / Appearance: x / SG: x / pH: x  Gluc: 242 mg/dL / Ketone: x  / Bili: x / Urobili: x   Blood: x / Protein: x / Nitrite: x   Leuk Esterase: x / RBC: x / WBC x   Sq Epi: x / Non Sq Epi: x / Bacteria: x      CULTURES: pending    Physical Examination:    General: No acute distress.  Alert, oriented x3, interactive, nonfocal    HEENT: Pupils equal, reactive to light.  Symmetric.    PULM: Clear to auscultation bilaterally    CVS: Regular rate and rhythm, no murmurs, rubs, or gallops    ABD: Soft, nondistended, nontender, normoactive bowel sounds, no masses    EXT: No edema, nontender    SKIN: Warm and well perfused, no rashes noted.    RADIOLOGY:   ACC: [id number] EXAM:  CT ABDOMEN AND PELVIS IC   ORDERED BY: DANNY MCCALLUM     ACC: [id number] EXAM:  CT CHEST IC   ORDERED BY: DANNY MCCALLUM     PROCEDURE DATE:  05/25/2025          INTERPRETATION:  CLINICAL INFORMATION: Hematemesis and abdominal pain.    COMPARISON: None.    CONTRAST/COMPLICATIONS:  IV Contrast: Omnipaque 350 (accession [id number]), IV contrast documented   in unlinked concurrent exam (accession [id number])  90 cc administered   10   cc discarded  Oral Contrast: NONE  .    PROCEDURE:  CT of the Chest, Abdomen and Pelvis was performed.  Sagittal and coronal reformats were performed.    FINDINGS:  CHEST:  LUNGS AND LARGE AIRWAYS: Patent central airways. No pulmonary nodules.   Left basilar atelectasis.  PLEURA: No pleural effusion.  VESSELS: Aortic calcifications. Coronary artery calcifications.  HEART: Heart size is normal. No pericardial effusion.  MEDIASTINUM AND JEANNIE: Moderate to large paraesophageal hiatal hernia.   Curvilinear arterial enhancement seen in the distal   esophagus/gastroesophageal junction (series 303, images 15 through 21),   with increased intraluminal filling of contrast on the delayed phase   images (series 306-42). Moderate amount of complex fluid in the esophagus.  CHEST WALL AND LOWER NECK: Within normal limits.    ABDOMEN AND PELVIS:  LIVER: Within normal limits.  BILE DUCTS: Normal caliber.  GALLBLADDER: Cholelithiasis.  SPLEEN: Within normal limits.  PANCREAS: Within normal limits.  ADRENALS: Within normal limits.  KIDNEYS/URETERS: No hydronephrosis or obstructive renal calculi. Right   renal cyst. Additional subcentimeter cortical hypodensities are too small   to characterize.    BLADDER: Decompressed and cannot be evaluated.  REPRODUCTIVE ORGANS: Calcified uterine fibroids. No suspicious adnexal   mass.    BOWEL: No bowel obstruction. Appendix is not visualized. No evidence of   inflammation in the pericecal region. Colonic diverticulosis without   evidence of acute diverticulitis. Esophagus and stomach is distended with   complex fluid. Heterogeneous debris within the stomach.  PERITONEUM/RETROPERITONEUM: Within normal limits.  VESSELS: Atherosclerotic changes.  LYMPH NODES: No lymphadenopathy.  ABDOMINAL WALL: Within normal limits.  BONES: Marked thoracolumbar levoscoliosis. Multilevel degenerative   changes.    IMPRESSION:  Foci of active GI bleeding in the lower esophagus/gastroesophageal   junction, with intraluminal pooling of contrast on the delayed phase   images.    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 Gastric distention with   complex fluid and heterogeneous debris may represent blood products.    Findings were discussed with Dr. Mccallum on 5/25/2025 1:14 PM by Dr. Partida with read back confirmation.    --- End of Report ---             SUSAN PARTIDA MD; Attending Radiologist  This document has been electronically signed. May 25 2025  1:18PM    CRITICAL CARE TIME SPENT: 38 mins assessing presenting problems of acute illness that poses high probability of life threatening deterioration or end organ damage/dysfunction.  Medical decision making including Initiating plan of care, reviewing data, reviewing radiology, direct patient bedside evaluation and interpretation of vital signs,  discussion with multidisciplinary team, all non inclusive of procedures. Date of entry of note is equal to date of entry of services rendered.

## 2025-05-25 NOTE — ED ADULT NURSE NOTE - NSFALLHARMRISKINTERV_ED_ALL_ED

## 2025-05-25 NOTE — CONSULT NOTE ADULT - NS ATTEND AMEND GEN_ALL_CORE FT
Late entry, pt seen and examined yesterday in ED    UGIB  hemorrhagic shock ad acute blood loss anemia  lactic acidosis  enncephalopathy    GI eval, family wishes to avoid procedures so will continue conservative care at this time  PPI bid  blood product transfusions  serial CBCs  phenylephrine started in ED for MAP >65  trend lactate  son updated by ICU NP, pt is usually A and O x 4, performs ADLs, is DNR/DNI

## 2025-05-25 NOTE — CONSULT NOTE ADULT - SUBJECTIVE AND OBJECTIVE BOX
********MICU ACCEPT NOTE********    HPI: 89-year-old female with past medical history of breast cancer with bilateral mastectomy presents today due to vomiting and diarrhea.  Patient is with aide at the bedside who reports numerous episodes of vomiting.  No prior abdominal surgery.  Patient has also been having intermittent small soft stools.  Patient takes Tylenol every morning.  No known fever currently.  Patient denies chest pain, shortness of breath,    Events  Hematemesis in ED     Allergies: No Known Allergies    PAST MEDICAL & SURGICAL HISTORY:  Hypertension      No significant past surgical history      FAMILY HISTORY:    SOCIAL HISTORY:    Home Medications:    Review of Systems:  Pertinent positives noted above, all other ROS negative x10 system    T(F): 97.4 (05-25-25 @ 13:55), Max: 98.4 (05-25-25 @ 10:35)  HR: 102 (05-25-25 @ 15:45) (70 - 115)  BP: 110/91 (05-25-25 @ 15:45) (60/48 - 117/59)  RR: 23 (05-25-25 @ 15:45) (16 - 23)  SpO2: 100% (05-25-25 @ 15:45)  Wt(kg): --    CAPILLARY BLOOD GLUCOSE      POCT Blood Glucose.: 257 mg/dL (25 May 2025 10:03)    I&O's Summary      Physical Exam:   GENERAL: NAD, lying in bed comfortably  HEAD:  Atraumatic, Normocephalic  EYES: EOMI, PERRLA, conjunctiva and sclera clear  ENT: Moist mucous membranes  NECK: Supple, No JVD  CHEST/LUNG: Clear to auscultation bilaterally; No rales, rhonchi, wheezing, or rubs. Unlabored respirations  HEART: Regular rate and rhythm; No murmurs, rubs, or gallops  ABDOMEN: Bowel sounds present; Soft, Nontender, Nondistended. No hepatomegally  EXTREMITIES:  2+ Peripheral Pulses, brisk capillary refill. No clubbing, cyanosis, or edema  NERVOUS SYSTEM:  lethargic  MSK: FROM all 4 extremities, full and equal strength  SKIN: pale, No rashes or lesions    Meds:    phenylephrine    Infusion 0.1 MICROgram(s)/kG/Min IV Continuous <Continuous>                                                            10.5   19.19 )-----------( 267      ( 25 May 2025 10:20 )             32.0       05-25    142  |  103  |  48[H]  ----------------------------<  242[H]  3.9   |  27  |  1.20    Ca    8.6      25 May 2025 10:20    TPro  6.2  /  Alb  3.1[L]  /  TBili  0.4  /  DBili  x   /  AST  16  /  ALT  15  /  AlkPhos  70  05-25    Lactate 5.4           05-25 @ 14:11    Lactate 4.3           05-25 @ 10:20          PT/INR - ( 25 May 2025 10:20 )   PT: 12.7 sec;   INR: 1.09 ratio         PTT - ( 25 May 2025 10:20 )  PTT:21.0 sec  Urinalysis Basic - ( 25 May 2025 10:20 )    Color: x / Appearance: x / SG: x / pH: x  Gluc: 242 mg/dL / Ketone: x  / Bili: x / Urobili: x   Blood: x / Protein: x / Nitrite: x   Leuk Esterase: x / RBC: x / WBC x   Sq Epi: x / Non Sq Epi: x / Bacteria: x              Radiology: ********MICU ACCEPT NOTE********    HPI: 89-year-old female with past medical history of breast cancer with bilateral mastectomy presents today due to vomiting and diarrhea.  Patient is with aide at the bedside who reports numerous episodes of vomiting.  No prior abdominal surgery.  Patient has also been having intermittent small soft stools.  Patient takes Tylenol every morning.  No known fever currently.  Patient denies chest pain, shortness of breath, cough, known sick contacts, recent travel, recent antibiotics, or any other complaints.    ED COURSE:  Vitals: T 97.3  F , HR 91 , BP 77 /79 , RR 23 , SpO2  100% on 2LNC  Labs significant for: wbc 19.19, hgb 10.5, mcv 108.5, lactate 5.4, BUN 48  Imaging: CT chest and abdomen/pelvis: Foci of active GI bleeding in the lower esophagus/gastroesophageal junction, with intraluminal pooling of contrast on the delayed phase images. 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 Gastric distention with complex fluid and heterogeneous debris may represent blood products.  EKG: Line Sinus tachycardia with premature atrial complexes, LVH, qtc 470  Pt received: zofran 4 mg IVP, protonix 40 mg IVP, zosyn 3.375, phenylephrine gtt, NS 2750 cc    Allergies: No Known Allergies    PAST MEDICAL & SURGICAL HISTORY:  Hypertension      No significant past surgical history      FAMILY HISTORY:    SOCIAL HISTORY:    Home Medications:    Review of Systems:  Pertinent positives noted above, all other ROS negative x10 system    T(F): 97.4 (25 @ 13:55), Max: 98.4 (25 @ 10:35)  HR: 102 (25 @ 15:45) (70 - 115)  BP: 110/91 (25 @ 15:45) (60/48 - 117/59)  RR: 23 (25 @ 15:45) (16 - 23)  SpO2: 100% (25 @ 15:45)  Wt(kg): --    CAPILLARY BLOOD GLUCOSE      POCT Blood Glucose.: 257 mg/dL (25 May 2025 10:03)    I&O's Summary      Physical Exam:   GENERAL: NAD, lying in bed comfortably  HEAD:  Atraumatic, Normocephalic  EYES: EOMI, PERRLA, conjunctiva and sclera clear  ENT: Moist mucous membranes  NECK: Supple, No JVD  CHEST/LUNG: Clear to auscultation bilaterally; No rales, rhonchi, wheezing, or rubs. Unlabored respirations  HEART: Regular rate and rhythm; No murmurs, rubs, or gallops  ABDOMEN: Bowel sounds present; Soft, Nontender, Nondistended. No hepatomegally  EXTREMITIES:  2+ Peripheral Pulses, brisk capillary refill. No clubbing, cyanosis, or edema  NERVOUS SYSTEM:  lethargic  MSK: FROM all 4 extremities, full and equal strength  SKIN: pale, No rashes or lesions    Meds:    phenylephrine    Infusion 0.1 MICROgram(s)/kG/Min IV Continuous <Continuous>                                                            10.5   19.19 )-----------( 267      ( 25 May 2025 10:20 )             32.0           142  |  103  |  48[H]  ----------------------------<  242[H]  3.9   |  27  |  1.20    Ca    8.6      25 May 2025 10:20    TPro  6.2  /  Alb  3.1[L]  /  TBili  0.4  /  DBili  x   /  AST  16  /  ALT  15  /  AlkPhos  70      Lactate 5.4            @ 14:11    Lactate 4.3            @ 10:20          PT/INR - ( 25 May 2025 10:20 )   PT: 12.7 sec;   INR: 1.09 ratio         PTT - ( 25 May 2025 10:20 )  PTT:21.0 sec  Urinalysis Basic - ( 25 May 2025 10:20 )    Color: x / Appearance: x / SG: x / pH: x  Gluc: 242 mg/dL / Ketone: x  / Bili: x / Urobili: x   Blood: x / Protein: x / Nitrite: x   Leuk Esterase: x / RBC: x / WBC x   Sq Epi: x / Non Sq Epi: x / Bacteria: x              Radiology:    X-Ray, Fluoroscopy:    25-May-2025 11:21, Xray Chest 1 View- PORTABLE-Urgent  PACS Image: Image(s) Available  Xray Chest 1 View- PORTABLE-Urgent:   	ACC: [id number] EXAM:  XR CHEST PORTABLE URGENT 1V   ORDERED BY: DANNY GONZALEZ   	  	PROCEDURE DATE:  2025    	  	  	  	INTERPRETATION:  Clinical History: Sepsis  	  	Frontal examination of the chest demonstrates the heart to be within   	normal limits in transverse diameter. Hiatal hernia. No acute   	infiltrates. Dextroscoliosis thoracic spine.  	  	IMPRESSION: No acute infiltrates.  	  	--- End of Report ---  	  	  	  	  	  	JOSEPH GALLO MD; Attending Radiologist  	This document has been electronically signed. May 25 2025 11:58AM    25-May-2025 12:30, CT Abdomen and Pelvis w/ IV Cont  PACS Image: Image(s) Available    25-May-2025 12:30, CT Chest w/ IV Cont  PACS Image: Image(s) Available    EKG:   EK-May-2025 10:07, 12 Lead ECG  12 Lead ECG: Ventricular Rate 111 BPM  	  	Atrial Rate 111 BPM  	  	P-R Interval 124 ms  	  	QRS Duration 88 ms  	  	Q-T Interval 346 ms  	  	QTC Calculation(Bazett) 470 ms  	  	P Axis 52 degrees  	  	R Axis -40 degrees  	  	T Axis 100 degrees  	  	Diagnosis Line Sinus tachycardia with premature atrial complexes  	Left axis deviation  	Left ventricular hypertrophy with repolarization abnormality ( R in aVL , Daniel product )  	Abnormal ECG  	When compared with ECG of 05-MAY-2022 07:57,  	Significant changes have occurred  	Confirmed by Eun Ac ([id number]) on 2025 2:47:19 PM

## 2025-05-25 NOTE — H&P ADULT - HISTORY OF PRESENT ILLNESS
89-year-old female with past medical history of breast cancer with bilateral mastectomy presents today due to vomiting and diarrhea.  Patient is with aide at the bedside who reports numerous episodes of vomiting.  No prior abdominal surgery.  Patient has also been having intermittent small soft stools.  Patient takes Tylenol every morning.  No known fever currently.  Patient denies chest pain, shortness of breath, cough, known sick contacts, recent travel, recent antibiotics, or any other complaints.    ED COURSE:  Vitals: T 97.3  F , HR 91 , BP 77 /79 , RR 23 , SpO2  100% on 2LNC  Labs significant for: wbc 19.19, hgb 10.5, mcv 108.5, lactate 5.4, BUN 48  Imaging: CT chest and abdomen/pelvis: Foci of active GI bleeding in the lower esophagus/gastroesophageal junction, with intraluminal pooling of contrast on the delayed phase images. 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 Gastric distention with complex fluid and heterogeneous debris may represent blood products.  EKG: Line Sinus tachycardia with premature atrial complexes, LVH, qtc 470  Pt received: zofran 4 mg IVP, protonix 40 mg IVP, zosyn 3.375, phenylephrine gtt, NS 2750 cc

## 2025-05-25 NOTE — H&P ADULT - PROBLEM SELECTOR PLAN 1
Foci of active GI bleeding in the lower esophagus/gastroesophageal junction  s/p 2750 NS bolus in ED  on Phenylephrine gtt  Monitor H/H, transfuse PRN  s/p zosyn in ED, c/w   c/w protonix gtt  Lactate 5.4, BUN 48, trend lactate   Rest of plan per ICU

## 2025-05-25 NOTE — H&P ADULT - ATTENDING COMMENTS
89-year-old female with past medical history of breast cancer with bilateral mastectomy presents today due to vomiting and diarrhea found to have GIB requiring admission to ICU.     Care discussed with GI, Dr. Gallo, started on protonix gtt, cont zosyn, pt with hypotension, on pressors phenyelphrine,. admitted to ICU.    I have spent 75 minutes on this admission excluding time spent teaching and other separately reported services.    Sanket Stewart, Attending Physician

## 2025-05-25 NOTE — CONSULT NOTE ADULT - ASSESSMENT
89-year-old female with past medical history of breast cancer with bilateral mastectomy presents today due to vomiting and diarrhea found to have GIB requiring admission to ICU.     Plan:     === NEUROLOGY ===      -Mental status: AAO x3 baseline, lethargic  -Sedation: None  -Pain control: None    === RESPIRATORY ===     -CHASE  -On RA  -DNR/DNI      === CARDIOVASCULAR ===    #shock 2/2 acute blood loss anemia  -on phenylephrine Infusion  -Transfuse Prbc given hypotension/elevated lactate       === RENAL/ ===     - CHASE  - continue to monitor ScR and urine out    === GASTROINTESTINAL ===    #GI bleed  -npo   -abd soft   -Transfuse Prbc given hypotension/elevated lactate  -npo  -IV PPI drip  -hold a/c and asa       === ENDO  ===    - check A1c & TSH    - ISS if hyperglycemia          === HEMATOLOGIC/ONC ===    #acute blood loss anemia  - Transfuse Prbc given hypotension/elevated lactate  - H/H = 10.5/32  - plts =  267  - Monitor H/H and plts   - DVT PPX: scd    === INFECTIOUS DISEASE ===     #Leukocytosis  - no fever  - Continue to monitor with daily CBC  - Pan culture if febrile  - Trend fever curve  - No indication for antibiotics at this time    Lines/Tubes: PIV x 2,     Dispo: Maintain in ICU while critically ill.     Ethics: Full code      89-year-old female with past medical history of breast cancer with bilateral mastectomy presents today due to vomiting and diarrhea found to have GIB requiring admission to ICU.     Plan:     === NEUROLOGY ===      -Mental status: AAO x3 baseline, lethargic  -Sedation: None  -Pain control: None    === RESPIRATORY ===     -CHASE  -On RA  -DNR/DNI      === CARDIOVASCULAR ===    #shock 2/2 acute blood loss anemia  -on phenylephrine Infusion  -Transfuse Prbc given hypotension/elevated lactate       === RENAL/ ===     - CHASE  - continue to monitor ScR and urine out    === GASTROINTESTINAL ===    #GI bleed  -npo   -abd soft   -Transfuse Prbc given hypotension/elevated lactate 4.3-->5.4  -npo  -IV PPI drip  -hold a/c and asa       === ENDO  ===    - check A1c & TSH    - ISS if hyperglycemia          === HEMATOLOGIC/ONC ===    #acute blood loss anemia  #Macrocytic anemia  - Transfuse Prbc given hypotension/elevated lactate  - send folate / b12  - H/H = 10.5/32  - plts =  267  - Monitor H/H and plts   - DVT PPX: scd    === INFECTIOUS DISEASE ===     #Leukocytosis  - no fever  - Continue to monitor with daily CBC  - Pan culture if febrile  - Trend fever curve  - No indication for antibiotics at this time    Lines/Tubes: PIV x 2,     Dispo: Maintain in ICU while critically ill.     Ethics: Full code

## 2025-05-25 NOTE — ED ADULT NURSE NOTE - OBJECTIVE STATEMENT
pt reporting n/v and constipation x 2 days. pt pale and clammy, + hypotension noted. pt connected to cardiac monitor and continuous pulse ox. o2 sat 89% on RA, placed on 2L NC, tolerating well at this time. 22G iv placed to left forearm, blood drawn and sent to lab. iv fluids infusing well without complications, iv site WNL. additional 20G iv placed to right forearm, iv abx infusing well without complications, iv site WNL. pt remains A&OX4, speech clear, RR even and unlabored. safety measures maintained, side rails up x2

## 2025-05-25 NOTE — CONSULT NOTE ADULT - SUBJECTIVE AND OBJECTIVE BOX
[facility]    Uli Lorraine Bone NP    [address] [place] [address] [phone]      Chief Complaint:  Patient is a 89y old  Female who presents with a chief complaint of     HPI:89-year-old female with past medical history of breast cancer with bilateral mastectomy presents today due to vomiting and diarrhea.  Patient is with aide at the bedside who reports numerous episodes of vomiting.  No prior abdominal surgery.  Patient has also been having intermittent small soft stools.  Patient takes Tylenol every morning.  No known fever currently.  Patient denies chest pain, shortness of breath, cough, known sick contacts, recent travel, recent antibiotics, or any other complaints.    in ED had hematemesis    Allergies:  No Known Allergies      Medications:  sodium chloride 0.9% Bolus 1000 milliLiter(s) IV Bolus once      PMHX/PSHX:  No pertinent past medical history    No pertinent past medical history    Hypertension    No significant past surgical history        Family history:      Social History:     ROS:     General:  No wt loss, fevers, chills, night sweats, fatigue,   Eyes:  Good vision, no reported pain  ENT:  No sore throat, pain, runny nose, dysphagia  CV:  No pain, palpitations, hypo/hypertension  Resp:  No dyspnea, cough, tachypnea, wheezing  GI:  No pain, No nausea, No vomiting, No diarrhea, No constipation, No weight loss, No fever, No pruritis, No rectal bleeding, No tarry stools, No dysphagia,  :  No pain, bleeding, incontinence, nocturia  Muscle:  No pain, weakness  Neuro:  No weakness, tingling, memory problems  Psych:  No fatigue, insomnia, mood problems, depression  Endocrine:  No polyuria, polydipsia, cold/heat intolerance  Heme:  No petechiae, ecchymosis, easy bruisability  Skin:  No rash, tattoos, scars, edema      PHYSICAL EXAM:   Vital Signs:  Vital Signs Last 24 Hrs  T(C): 36.6 (25 May 2025 10:00), Max: 36.6 (25 May 2025 10:00)  T(F): 97.9 (25 May 2025 10:00), Max: 97.9 (25 May 2025 10:00)  HR: 104 (25 May 2025 12:31) (98 - 115)  BP: 117/59 (25 May 2025 12:31) (72/40 - 117/59)  BP(mean): 72 (25 May 2025 12:31) (62 - 72)  RR: 16 (25 May 2025 12:31) (16 - 20)  SpO2: 100% (25 May 2025 12:31) (95% - 100%)    Parameters below as of 25 May 2025 12:31  Patient On (Oxygen Delivery Method): nasal cannula  O2 Flow (L/min): 2    Daily Height in cm: 165.1 (25 May 2025 10:00)    Daily     GENERAL:  Appears stated age, well-groomed, well-nourished, no distress  HEENT:  NC/AT,  conjunctivae clear and pink, no thyromegaly, nodules, adenopathy, no JVD, sclera -anicteric  CHEST:  Full & symmetric excursion, no increased effort, breath sounds clear  HEART:  Regular rhythm, S1, S2, no murmur/rub/S3/S4, no abdominal bruit, no edema  ABDOMEN:  Soft, non-tender, non-distended, normoactive bowel sounds,  no masses ,no hepato-splenomegaly, no signs of chronic liver disease  EXTEREMITIES:  no cyanosis,clubbing or edema  SKIN:  No rash/erythema/ecchymoses/petechiae/wounds/abscess/warm/dry  NEURO:  Alert, oriented, no asterixis, no tremor, no encephalopathy    LABS:                        10.5   19.19 )-----------( 267      ( 25 May 2025 10:20 )             32.0     05-25    142  |  103  |  48[H]  ----------------------------<  242[H]  3.9   |  27  |  1.20    Ca    8.6      25 May 2025 10:20    TPro  6.2  /  Alb  3.1[L]  /  TBili  0.4  /  DBili  x   /  AST  16  /  ALT  15  /  AlkPhos  70  05-25    LIVER FUNCTIONS - ( 25 May 2025 10:20 )  Alb: 3.1 g/dL / Pro: 6.2 g/dL / ALK PHOS: 70 U/L / ALT: 15 U/L / AST: 16 U/L / GGT: x             Urinalysis Basic - ( 25 May 2025 10:20 )    Color: x / Appearance: x / SG: x / pH: x  Gluc: 242 mg/dL / Ketone: x  / Bili: x / Urobili: x   Blood: x / Protein: x / Nitrite: x   Leuk Esterase: x / RBC: x / WBC x   Sq Epi: x / Non Sq Epi: x / Bacteria: x          Imaging:

## 2025-05-25 NOTE — ED PROVIDER NOTE - NSICDXNOPASTSURGICALHX_GEN_ALL_ED
Weeks 10 to 14 of Your Pregnancy: Care Instructions  It's now possible to hear the fetus's heartbeat with a special ultrasound device. And the fetus's organs are developing.    Decide about tests to check for birth defects. Think about your age, your chance of passing on a family disease, your need to know about any problems, and what you might do after you have the test results.    It's okay to exercise. Try activities such as walking or swimming. Check with your doctor before starting a new program.    You may feel more tired than usual.  Taking naps during the day may help.     You may feel emotional.  It might help to talk to someone.     You may have headaches.  Try lying down and putting a cool cloth over your forehead.     You can use acetaminophen (Tylenol) for pain relief.  Don't take any anti-inflammatory medicines (such as Advil, Motrin, Aleve), unless your doctor says it's okay.     You may feel a fullness or aching in your lower belly.  This can feel like the kind of cramps you might get before a period. A back rub may help.     You may need to urinate more.  Your growing uterus and changing hormones can affect your bladder.     You may feel sick to your stomach (morning sickness).  Try avoiding food and smells that make you feel sick.     Your breasts may feel different.  They may feel tender or get bigger. Your nipples may get darker. Try a bra that gives you good support.     Avoid alcohol, tobacco, and drugs (including marijuana).  If you need help quitting, talk to your doctor.     Take a daily prenatal vitamin.  Choose one with folic acid.   Follow-up care is a key part of your treatment and safety. Be sure to make and go to all appointments, and call your doctor if you are having problems. It's also a good idea to know your test results and keep a list of the medicines you take.  Where can you learn more?  Go to https://www.healthwise.net/patiented  Enter E090 in the search box to learn more  "about \"Weeks 10 to 14 of Your Pregnancy: Care Instructions.\"  Current as of: July 10, 2023               Content Version: 14.0    2379-6297 [employer].   Care instructions adapted under license by your healthcare professional. If you have questions about a medical condition or this instruction, always ask your healthcare professional. [employer] disclaims any warranty or liability for your use of this information.        Nutrition During Pregnancy: Care Instructions  Overview     Healthy eating when you are pregnant is important for you and your baby. It can help you feel well and have a successful pregnancy and delivery. During pregnancy your nutrition needs increase. Even if you have excellent eating habits, your doctor may recommend a multivitamin to make sure you get enough iron and folic acid.  You may wonder how much weight you should gain. In general, if you were at a healthy weight before you became pregnant, then you should gain between 25 and 35 pounds. If you were overweight before pregnancy, then you'll likely be advised to gain 15 to 25 pounds. If you were underweight before pregnancy, then you'll probably be advised to gain 28 to 40 pounds. Your doctor will work with you to set a weight goal that is right for you. Gaining a healthy amount of weight helps you have a healthy baby.  Follow-up care is a key part of your treatment and safety. Be sure to make and go to all appointments, and call your doctor if you are having problems. It's also a good idea to know your test results and keep a list of the medicines you take.  How can you care for yourself at home?  Eat plenty of fruits and vegetables. Include a variety of orange, yellow, and leafy dark-green vegetables every day.  Choose whole-grain bread, cereal, and pasta. Good choices include whole wheat bread, whole wheat pasta, brown rice, and oatmeal.  Get 4 or more servings of milk and milk products each day. Good choices " include nonfat or low-fat milk, yogurt, and cheese. If you cannot eat milk products, you can get calcium from calcium-fortified products such as orange juice, soy milk, and tofu. Other non-milk sources of calcium include leafy green vegetables, such as broccoli, kale, mustard greens, turnip greens, bok carlos enrique, and brussels sprouts.  If you eat meat, pick lower-fat types. Good choices include lean cuts of meat and chicken or turkey without the skin.  Avoid fish that are high in mercury. These include shark, swordfish, samuel mackerel, marlin, orange roughy, and bigeye tuna, as well as tilefish from the [facility].  It's okay to eat up to 8 to 12 ounces a week of fish that are low in mercury or up to 4 ounces a week of fish that have medium levels of mercury. Some fish that are low in mercury are salmon, shrimp, canned light tuna, cod, and tilapia. Some fish that have medium levels of mercury are halibut and white albacore tuna.  For more advice about eating fish, you can visit the U.S. Food and Drug Administration (FDA) or U.S. Environmental Protection Agency (EPA) website.  Heat lunch meats (such as turkey, ham, or bologna) to 165 F before you eat them. This reduces your risk of getting sick from a kind of bacteria that can be found in lunch meats.  Do not eat unpasteurized soft cheeses, such as brie, feta, fresh mozzarella, and blue cheese. They have a bacteria that could harm your baby.  Limit caffeine to about 200 to 300 mg per day. On average, a cup of brewed coffee has around 80 to 100 mg of caffeine.  Do not drink any alcohol. No amount of alcohol has been found to be safe during pregnancy.  Do not diet or try to lose weight. For example, do not follow a low-carbohydrate diet. If you are overweight at the start of your pregnancy, your doctor will work with you to manage your weight gain.  Tell your doctor about all vitamins and supplements you take.  When should you call for help?  Watch closely for changes in  "your health, and be sure to contact your doctor if you have any problems.  Where can you learn more?  Go to https://www.[employer].net/patiented  Enter Y785 in the search box to learn more about \"Nutrition During Pregnancy: Care Instructions.\"  Current as of: September 20, 2023               Content Version: 14.0    0749-8554 [employer].   Care instructions adapted under license by your healthcare professional. If you have questions about a medical condition or this instruction, always ask your healthcare professional. [employer] disclaims any warranty or liability for your use of this information.      Exercise During Pregnancy: Care Instructions  Overview     Exercise is good for you during a healthy pregnancy. It can relieve back pain, swelling, and other discomforts. It also prepares your muscles for childbirth. And exercise can improve your energy level and help you sleep better.  If your doctor advises it, get more exercise. For example, walking is a good choice. Bit by bit, increase the amount you walk every day. Try for at least 30 minutes on most days of the week. You could also try a prenatal exercise class. But if you do not already exercise, be sure to talk with your doctor before you start a new exercise program. Doctors do not recommend contact sports during pregnancy.  Follow-up care is a key part of your treatment and safety. Be sure to make and go to all appointments, and call your doctor if you are having problems. It's also a good idea to know your test results and keep a list of the medicines you take.  How can you care for yourself at home?  Talk with your doctor about the right kind of exercise for each stage of pregnancy.  Listen to your body to know if your exercise is at a safe level.  Do not become overheated while you exercise. High body temperature can be harmful. Avoid activities that can make your body too hot.  If you feel tired, take it easy. You might walk " "instead of run.  If you are used to strenuous exercise, ask your doctor how to know when it's time to slow down.  If you exercised before getting pregnant, you should be able to keep up your routine early in your pregnancy. Later in your pregnancy, you may want to switch to more gentle activities.  Drink plenty of fluids before, during, and after exercise.  Avoid contact sports, such as soccer and basketball. Also avoid risky activities. These include scuba diving, horseback riding, and exercising at a high altitude (above 6,000 feet). If you live in a place with a high altitude, talk to your doctor about how you can exercise safely.  Do not get overtired while you exercise. You should be able to talk while you work out.  After your fourth month of pregnancy, avoid exercises that require you to lie flat on your back on a hard surface. These include sit-ups and some yoga poses.  Get plenty of rest. You may be very tired while you are pregnant.  Where can you learn more?  Go to https://www.tuul.net/patiented  Enter S801 in the search box to learn more about \"Exercise During Pregnancy: Care Instructions.\"  Current as of: July 10, 2023               Content Version: 14.0    2642-0636 [employer].   Care instructions adapted under license by your healthcare professional. If you have questions about a medical condition or this instruction, always ask your healthcare professional. [employer] disclaims any warranty or liability for your use of this information.      Learning About Pregnancy and Obesity  How does your weight affect your pregnancy?     The basics of prenatal care are the same for everyone, regardless of size. You'll get what you need to have a healthy baby.  But your size can make a difference in a few things. You and your doctor will have to watch your pregnancy weight. Your weight may affect your labor and delivery.  You may have some extra doctor visits and tests. And you " "may have some tests earlier in your pregnancy. You'll need to pay close attention to things like blood pressure and the chance of getting gestational diabetes. (This is a type of diabetes that sometimes happens during pregnancy.) And close attention will be given to your developing baby.  Work with your doctor to get the care you need. Go to all your doctor visits, and follow your doctor's advice about what to do and what to avoid during pregnancy.  How much weight gain is healthy?  If you are very overweight (obese), experts recommend that you gain between 11 and 20 pounds. Your doctor will work with you to set a weight goal that's right for you. In some cases, your doctor may recommend that you not gain any weight.  How much extra food do you need to eat?  Although you may joke that you're \"eating for two\" during pregnancy, you don't need to eat twice as much food. How much you can eat depends on:  Your height.  How much you weigh when you get pregnant.  How active you are.  If you're carrying more than one fetus (multiple pregnancy).  In the first trimester, you'll probably need the same amount of calories as you did before you were pregnant. In general, in your second trimester, you need to eat about 340 extra calories a day. In your third trimester, you need to eat about 450 extra calories a day.  What can you do to have a healthy pregnancy?  The best things you can do for you and your baby are to eat healthy foods, get regular exercise, avoid alcohol and smoking, and go to your doctor visits.  Eat a variety of foods from all the food groups. Make sure to get enough calcium and folic acid.  You may want to work with a dietitian to help you plan healthy meals to get the right amount of calories for you.  If you didn't exercise much before you got pregnant, talk to your doctor about how you can slowly get more active. Your doctor may want to set up an exercise program with you.  Where can you learn more?  Go to " "https://www."[employer]net/patiented  Enter B644 in the search box to learn more about \"Learning About Pregnancy and Obesity.\"  Current as of: July 10, 2023               Content Version: 14.0    4397-1791 [employer].   Care instructions adapted under license by your healthcare professional. If you have questions about a medical condition or this instruction, always ask your healthcare professional. [employer] disclaims any warranty or liability for your use of this information.      You have been provided the CDC Warning Signs in Pregnancy document.    Additional copies can be found here: www.[employer]/153565.pdf  Weeks 14 to 18 of Your Pregnancy: Care Instructions  Around this time, you may start to look pregnant. Your baby is now able to pass urine. And the first stool (meconium) is starting to collect in your baby's intestines. Hair is starting to grow on your baby's head.    You may notice some skin changes, such as itchy spots on your palms or acne on your face.    At your next doctor visit, you may have an ultrasound. So you might think about whether you want to know the sex of your baby. Also ask your doctor about flu and COVID-19 shots.     How to reduce stress   Ask for help when you need it.  Try to avoid things that cause you stress.  Seek out things that relieve stress, such as breathing exercises or yoga.     How to get exercise   If you don't usually exercise, start slowly. Short walks may be a good choice.  Try to be active 30 minutes a day, at least 5 days a week.  Avoid activities where you're more likely to fall.  Use light weights to reduce stress on your joints.     How to stay at a healthy weight for you   Talk to your doctor or midwife about how much weight you should gain.  It's generally best to gain:  About 28 to 40 pounds if you're underweight.  About 25 to 35 pounds if you're at a healthy weight.  About 15 to 25 pounds if you're overweight.  About 11 to 20 " "pounds if you're very overweight (obese).  Follow-up care is a key part of your treatment and safety. Be sure to make and go to all appointments, and call your doctor if you are having problems. It's also a good idea to know your test results and keep a list of the medicines you take.  Where can you learn more?  Go to https://www.[employer].net/patiented  Enter I453 in the search box to learn more about \"Weeks 14 to 18 of Your Pregnancy: Care Instructions.\"  Current as of: July 10, 2023               Content Version: 14.0    1108-6856 [employer].   Care instructions adapted under license by your healthcare professional. If you have questions about a medical condition or this instruction, always ask your healthcare professional. [employer] disclaims any warranty or liability for your use of this information.      Second-Trimester Fetal Ultrasound: About This Test  What is it?     Fetal ultrasound is a test that uses sound waves to make pictures of your baby (fetus) and placenta inside the uterus. The test is the safest way to find out the age, size, and position of your baby. You also may be able to find out the sex of your baby. (But the test isn't done just to find out a baby's sex.)  No known risks to the mother or the baby are linked to fetal ultrasound. But you may feel anxious if the test reveals a problem with your pregnancy or baby.  Why is this test done?  In the second trimester, a fetal ultrasound is done to:  Estimate the number of weeks and days a fetus has developed since the beginning of the pregnancy. This is called the gestational age.  Look at the size and position of the fetus, the placenta, and the fluid that surrounds the fetus.  Find major birth defects, such as heart problems or problems with the brain and spinal cord (neural tube defects). But the test may not be able to find many minor defects and some major birth defects.  How do you prepare for the test?  In " "general, there's nothing you have to do before this test, unless your doctor tells you to.  How is the test done?  You may be able to leave your clothes on, or you will be given a gown to wear.  You will lie on your back on a padded examination table.  A gel will be spread on your belly. It will be removed after the test.  A small, handheld device called a transducer will be pressed against the gel on your skin and moved across your belly several times.  You may watch the monitor to see the picture of your baby during the test.  What happens after the test?  You will probably be able to go home right away.  You most likely will be able to go back to your usual activities right away.  Follow-up care is a key part of your treatment and safety. Be sure to make and go to all appointments, and call your doctor if you are having problems. It's also a good idea to keep a list of the medicines you take. Ask your doctor when you can expect to have your test results.  Where can you learn more?  Go to https://www.[employer].net/patiented  Enter Y671 in the search box to learn more about \"Second-Trimester Fetal Ultrasound: About This Test.\"  Current as of: July 10, 2023               Content Version: 14.0    8147-3567 [employer].   Care instructions adapted under license by your healthcare professional. If you have questions about a medical condition or this instruction, always ask your healthcare professional. [employer] disclaims any warranty or liability for your use of this information.      During Pregnancy: Exercises  Introduction  Here are some examples of exercises to do during your pregnancy. Start each exercise slowly. Ease off the exercise if you start to have pain.  Talk to your doctor about when you can start these exercises and which ones will work best for you.  How to do the exercises  Neck rotation    Sit up straight in a firm chair, or stand up straight. If you're standing, keep " your feet about hip-width apart.  Keeping your chin level, turn your head to the right and hold for 15 to 30 seconds.  Turn your head to the left and hold for 15 to 30 seconds.  Repeat 2 to 4 times.  Neck stretch to the front    Sit up straight in a firm chair, or stand up straight. Look straight ahead. If you're standing, keep your feet about hip-width apart.  Slowly bend your head forward without moving your shoulders.  Hold for 15 to 30 seconds, then return to your starting position.  Repeat 2 to 4 times.  Back press    Stand with your back 10 to 12 inches away from a wall.  Lean into the wall until your back is against it. Press your lower back against the wall by pulling in your stomach muscles.  Slowly slide down until your knees are slightly bent, pressing your lower back against the wall.  Hold for at least 6 seconds, then slide back up the wall.  Repeat 8 to 12 times.  Over time, work up to holding this position for as much as 1 minute.  Trunk twist    Sit on the floor with your legs crossed. If that's not comfortable, you can sit on a folded blanket so your bottom is a few inches off the floor. Or you can sit on a chair with your knees hip-width apart and your feet flat on the floor.  Reach your left hand toward your right knee. You can place your right hand at your side for support.  Slowly twist your body (trunk) to your right.  Relax and return to your starting position.  Repeat 2 to 4 times.  Switch your hands and twist to your left.  Repeat 2 to 4 times.  Pelvic rocking on hands and knees    Start on your hands and knees. Place your wrists directly below your shoulders and your knees below your hips.  Breathe in slowly. Tuck your head downward and round your back up, making a curve with your back in the shape of the letter C. Hold this position for about 6 seconds.  Breathe out slowly and bring your head back up. Relax, keeping your back straight. (Don't allow it to curve toward the floor.) Hold for  about 6 seconds.  Repeat 8 to 12 times, gently rocking your pelvis.  Pelvic tilt    Lie on your back with your knees bent and your feet flat on the floor.  Tighten your belly muscles by pulling your belly button in toward your spine. Press your lower back to the floor. You should feel your hips and pelvis rock back.  Hold for 6 seconds while breathing smoothly, and then relax.  Repeat 8 to 12 times.  Do this exercise only during the first 4 months of pregnancy. After this point, lying on your back is not recommended, because it can cause blood flow problems for you and your baby.  Backward stretch    Start on your hands and knees with your knees 8 to 10 inches apart, hands directly below your shoulders, and arms and back straight.  Keeping your arms straight, slowly lower your buttocks toward your heels and tuck your head toward your knees. Hold for 15 to 30 seconds.  Slowly return to the starting position.  Repeat 2 to 4 times.  Forward bend    Sit comfortably in a chair, with your arms relaxed.  Slowly bend forward, allowing your arms to hang down. Lean only as far as you can without feeling discomfort or pressure on your belly.  Hold for 15 to 30 seconds and then slowly sit up straight.  Repeat 2 to 4 times or to your comfort level.  Donkey kick    Start on your hands and knees. Place your hands directly below your shoulders, and keep your arms straight.  Tighten your belly muscles by pulling your belly button in toward your spine. Keep breathing normally, and don't hold your breath.  Lift one knee and bring it toward your elbow.  Slowly extend that leg behind you without completely straightening it. Be careful not to let your hip drop down. Avoid arching your back.  Hold your leg behind you for about 6 seconds.  Return to your starting position.  Repeat 8 to 12 times for each leg.  Tailor sitting    Sit on the floor.  Bring your feet close to your body while crossing your ankles.  Keep your back straight. Relax  your legs and let your knees drop toward the floor.  Hold this position for as long as you are comfortable.  Toe reach    Sit on the floor with your back straight, legs about 12 inches apart, and feet relaxed outward.  Stretch your hands forward toward your right foot, then sit up.  Stretch your hands straight forward, then sit up.  Stretch your hands forward toward your left foot, then sit up.  Hold each stretch for 15 to 30 seconds.  Repeat 2 to 4 times.  Follow-up care is a key part of your treatment and safety. Be sure to make and go to all appointments, and call your doctor if you are having problems. It's also a good idea to know your test results and keep a list of the medicines you take.  Current as of: July 10, 2023               Content Version: 14.0    7287-9986 [employer].   Care instructions adapted under license by your healthcare professional. If you have questions about a medical condition or this instruction, always ask your healthcare professional. [employer] disclaims any warranty or liability for your use of this information.      Weeks 14 to 18 of Your Pregnancy: Care Instructions  Around this time, you may start to look pregnant. Your baby is now able to pass urine. And the first stool (meconium) is starting to collect in your baby's intestines. Hair is starting to grow on your baby's head.    You may notice some skin changes, such as itchy spots on your palms or acne on your face.    At your next doctor visit, you may have an ultrasound. So you might think about whether you want to know the sex of your baby. Also ask your doctor about flu and COVID-19 shots.     How to reduce stress   Ask for help when you need it.  Try to avoid things that cause you stress.  Seek out things that relieve stress, such as breathing exercises or yoga.     How to get exercise   If you don't usually exercise, start slowly. Short walks may be a good choice.  Try to be active 30 minutes a  "day, at least 5 days a week.  Avoid activities where you're more likely to fall.  Use light weights to reduce stress on your joints.     How to stay at a healthy weight for you   Talk to your doctor or midwife about how much weight you should gain.  It's generally best to gain:  About 28 to 40 pounds if you're underweight.  About 25 to 35 pounds if you're at a healthy weight.  About 15 to 25 pounds if you're overweight.  About 11 to 20 pounds if you're very overweight (obese).  Follow-up care is a key part of your treatment and safety. Be sure to make and go to all appointments, and call your doctor if you are having problems. It's also a good idea to know your test results and keep a list of the medicines you take.  Where can you learn more?  Go to [email or web address]  Enter I453 in the search box to learn more about \"Weeks 14 to 18 of Your Pregnancy: Care Instructions.\"  Current as of: July 10, 2023               Content Version: 14.0    [id number] [employer].   Care instructions adapted under license by your healthcare professional. If you have questions about a medical condition or this instruction, always ask your healthcare professional. [employer] disclaims any warranty or liability for your use of this information.      " <-- Click to add NO significant Past Surgical History

## 2025-05-25 NOTE — ED PROVIDER NOTE - ATTENDING APP SHARED VISIT CONTRIBUTION OF CARE
Examination reveals a thin frail white female chronically ill-appearing in mild distress with clear lungs normal heart sounds bilateral mastectomy flat soft minimally tender abdomen in the epigastrium and skin that is somewhat cool without rash.  I agree with plan and management outlined by PA.

## 2025-05-25 NOTE — H&P ADULT - PROBLEM SELECTOR PLAN 2
DVT ppx hold in the setting of GIB DVT ppx hold in the setting of GIB    Dispo: Hx of breast cancer, currently in HOME HOSPICE  - stopped taking statins recents  - takes ASA 81, hold  - Tylenol 1g BID for pain/ generalized aches

## 2025-05-25 NOTE — CONSULT NOTE ADULT - ASSESSMENT
GI bleed  acute blood loss anemia    Transfuse Prbc given hypotension/elevated lactate  npo  IV PPI drip  hold a/c and asa  await CT   will d/w family GI bleed  acute blood loss anemia    Transfuse Prbc given hypotension/elevated lactate  npo  IV PPI drip  hold a/c and asa  await CT   will d/w family    attempted to call family; number listed is not in service  aide who is with patient does not know patients history or information GI bleed  acute blood loss anemia    Transfuse Prbc given hypotension/elevated lactate  npo  IV PPI drip  hold a/c and asa  await CT   will d/w family    attempted to call family; number listed is not in service  aide who is with patient does not know patients history or information    case d/w son Oliverio at [phone];   she has a large hiatal hernia with distended fluid filled stomach; if EGD were to be required would require general anesthesia  he would like to avoid procedures if possible  cont medical management with PPI, prbc, platlet transfusion (son unsure if she was on asa)   she is DNR/DNI

## 2025-05-25 NOTE — H&P ADULT - NSHPREVIEWOFSYSTEMS_GEN_ALL_CORE
REVIEW OF SYSTEMS:  CONSTITUTIONAL: No fever, chills or fatigue.  HENMT: No HA, dizziness, rhinorrhea  RESPIRATORY: No cough or shortness of breath.  CARDIOVASCULAR: No chest pain, palpitations.  GASTROINTESTINAL: + abdominal pain  GENITOURINARY: No dysuria, changes in frequency, hematuria, or incontinence  NEUROLOGICAL: Baseline strength. Sensation intact bilaterally.  SKIN: No itching, rashes  MUSCULOSKELETAL: No joint pain or swelling; No muscle, back, or extremity pain

## 2025-05-26 ENCOUNTER — NON-APPOINTMENT (OUTPATIENT)
Age: 89
End: 2025-05-26

## 2025-05-26 ENCOUNTER — RESULT REVIEW (OUTPATIENT)
Age: 89
End: 2025-05-26

## 2025-05-26 LAB
A1C WITH ESTIMATED AVERAGE GLUCOSE RESULT: 5.6 % — SIGNIFICANT CHANGE UP (ref 4–5.6)
ANION GAP SERPL CALC-SCNC: 3 MMOL/L — LOW (ref 5–17)
ANION GAP SERPL CALC-SCNC: 8 MMOL/L — SIGNIFICANT CHANGE UP (ref 5–17)
APTT BLD: 21.8 SEC — LOW (ref 26.1–36.8)
BUN SERPL-MCNC: 43 MG/DL — HIGH (ref 7–23)
BUN SERPL-MCNC: 49 MG/DL — HIGH (ref 7–23)
CALCIUM SERPL-MCNC: 7.5 MG/DL — LOW (ref 8.5–10.1)
CALCIUM SERPL-MCNC: 7.7 MG/DL — LOW (ref 8.5–10.1)
CHLORIDE SERPL-SCNC: 113 MMOL/L — HIGH (ref 96–108)
CHLORIDE SERPL-SCNC: 117 MMOL/L — HIGH (ref 96–108)
CO2 SERPL-SCNC: 27 MMOL/L — SIGNIFICANT CHANGE UP (ref 22–31)
CO2 SERPL-SCNC: 30 MMOL/L — SIGNIFICANT CHANGE UP (ref 22–31)
CREAT SERPL-MCNC: 0.56 MG/DL — SIGNIFICANT CHANGE UP (ref 0.5–1.3)
CREAT SERPL-MCNC: 0.89 MG/DL — SIGNIFICANT CHANGE UP (ref 0.5–1.3)
EGFR: 62 ML/MIN/1.73M2 — SIGNIFICANT CHANGE UP
EGFR: 62 ML/MIN/1.73M2 — SIGNIFICANT CHANGE UP
EGFR: 87 ML/MIN/1.73M2 — SIGNIFICANT CHANGE UP
EGFR: 87 ML/MIN/1.73M2 — SIGNIFICANT CHANGE UP
ESTIMATED AVERAGE GLUCOSE: 114 MG/DL — SIGNIFICANT CHANGE UP (ref 68–114)
GLUCOSE SERPL-MCNC: 139 MG/DL — HIGH (ref 70–99)
GLUCOSE SERPL-MCNC: 159 MG/DL — HIGH (ref 70–99)
HCT VFR BLD CALC: 29.8 % — LOW (ref 34.5–45)
HCT VFR BLD CALC: 36.7 % — SIGNIFICANT CHANGE UP (ref 34.5–45)
HCT VFR BLD CALC: 37.8 % — SIGNIFICANT CHANGE UP (ref 34.5–45)
HGB BLD-MCNC: 10.1 G/DL — LOW (ref 11.5–15.5)
HGB BLD-MCNC: 12.3 G/DL — SIGNIFICANT CHANGE UP (ref 11.5–15.5)
HGB BLD-MCNC: 12.9 G/DL — SIGNIFICANT CHANGE UP (ref 11.5–15.5)
INR BLD: 1.11 RATIO — SIGNIFICANT CHANGE UP (ref 0.85–1.16)
LACTATE SERPL-SCNC: 3.4 MMOL/L — HIGH (ref 0.7–2)
MAGNESIUM SERPL-MCNC: 1.8 MG/DL — SIGNIFICANT CHANGE UP (ref 1.6–2.6)
MCHC RBC-ENTMCNC: 31 PG — SIGNIFICANT CHANGE UP (ref 27–34)
MCHC RBC-ENTMCNC: 31.2 PG — SIGNIFICANT CHANGE UP (ref 27–34)
MCHC RBC-ENTMCNC: 32.5 PG — SIGNIFICANT CHANGE UP (ref 27–34)
MCHC RBC-ENTMCNC: 33.5 G/DL — SIGNIFICANT CHANGE UP (ref 32–36)
MCHC RBC-ENTMCNC: 33.9 G/DL — SIGNIFICANT CHANGE UP (ref 32–36)
MCHC RBC-ENTMCNC: 34.1 G/DL — SIGNIFICANT CHANGE UP (ref 32–36)
MCV RBC AUTO: 91.3 FL — SIGNIFICANT CHANGE UP (ref 80–100)
MCV RBC AUTO: 92.4 FL — SIGNIFICANT CHANGE UP (ref 80–100)
MCV RBC AUTO: 95.8 FL — SIGNIFICANT CHANGE UP (ref 80–100)
NRBC BLD AUTO-RTO: 0 /100 WBCS — SIGNIFICANT CHANGE UP (ref 0–0)
PHOSPHATE SERPL-MCNC: 2.5 MG/DL — SIGNIFICANT CHANGE UP (ref 2.5–4.5)
PLATELET # BLD AUTO: 146 K/UL — LOW (ref 150–400)
PLATELET # BLD AUTO: 166 K/UL — SIGNIFICANT CHANGE UP (ref 150–400)
PLATELET # BLD AUTO: 244 K/UL — SIGNIFICANT CHANGE UP (ref 150–400)
POTASSIUM SERPL-MCNC: 3.7 MMOL/L — SIGNIFICANT CHANGE UP (ref 3.5–5.3)
POTASSIUM SERPL-MCNC: 3.9 MMOL/L — SIGNIFICANT CHANGE UP (ref 3.5–5.3)
POTASSIUM SERPL-SCNC: 3.7 MMOL/L — SIGNIFICANT CHANGE UP (ref 3.5–5.3)
POTASSIUM SERPL-SCNC: 3.9 MMOL/L — SIGNIFICANT CHANGE UP (ref 3.5–5.3)
PROTHROM AB SERPL-ACNC: 13.1 SEC — SIGNIFICANT CHANGE UP (ref 9.9–13.4)
RBC # BLD: 3.11 M/UL — LOW (ref 3.8–5.2)
RBC # BLD: 3.97 M/UL — SIGNIFICANT CHANGE UP (ref 3.8–5.2)
RBC # BLD: 4.14 M/UL — SIGNIFICANT CHANGE UP (ref 3.8–5.2)
RBC # FLD: 17.4 % — HIGH (ref 10.3–14.5)
RBC # FLD: 18.4 % — HIGH (ref 10.3–14.5)
RBC # FLD: 18.6 % — HIGH (ref 10.3–14.5)
SODIUM SERPL-SCNC: 148 MMOL/L — HIGH (ref 135–145)
SODIUM SERPL-SCNC: 150 MMOL/L — HIGH (ref 135–145)
WBC # BLD: 16.73 K/UL — HIGH (ref 3.8–10.5)
WBC # BLD: 17.95 K/UL — HIGH (ref 3.8–10.5)
WBC # BLD: 22.82 K/UL — HIGH (ref 3.8–10.5)
WBC # FLD AUTO: 16.73 K/UL — HIGH (ref 3.8–10.5)
WBC # FLD AUTO: 17.95 K/UL — HIGH (ref 3.8–10.5)
WBC # FLD AUTO: 22.82 K/UL — HIGH (ref 3.8–10.5)

## 2025-05-26 PROCEDURE — 93306 TTE W/DOPPLER COMPLETE: CPT | Mod: 26

## 2025-05-26 PROCEDURE — 99291 CRITICAL CARE FIRST HOUR: CPT

## 2025-05-26 PROCEDURE — 99232 SBSQ HOSP IP/OBS MODERATE 35: CPT

## 2025-05-26 RX ORDER — ACETAMINOPHEN 500 MG/5ML
600 LIQUID (ML) ORAL ONCE
Refills: 0 | Status: COMPLETED | OUTPATIENT
Start: 2025-05-26 | End: 2025-05-26

## 2025-05-26 RX ORDER — SODIUM CHLORIDE 9 G/1000ML
1000 INJECTION, SOLUTION INTRAVENOUS
Refills: 0 | Status: DISCONTINUED | OUTPATIENT
Start: 2025-05-26 | End: 2025-05-27

## 2025-05-26 RX ORDER — CALCIUM GLUCONATE 20 MG/ML
1 INJECTION, SOLUTION INTRAVENOUS ONCE
Refills: 0 | Status: COMPLETED | OUTPATIENT
Start: 2025-05-26 | End: 2025-05-26

## 2025-05-26 RX ORDER — ACETAMINOPHEN 500 MG/5ML
650 LIQUID (ML) ORAL EVERY 6 HOURS
Refills: 0 | Status: DISCONTINUED | OUTPATIENT
Start: 2025-05-26 | End: 2025-05-29

## 2025-05-26 RX ORDER — INSULIN LISPRO 100 U/ML
INJECTION, SOLUTION INTRAVENOUS; SUBCUTANEOUS
Refills: 0 | Status: DISCONTINUED | OUTPATIENT
Start: 2025-05-26 | End: 2025-05-27

## 2025-05-26 RX ORDER — DEXTROMETHORPHAN HBR, GUAIFENESIN 200 MG/10ML
600 LIQUID ORAL EVERY 12 HOURS
Refills: 0 | Status: DISCONTINUED | OUTPATIENT
Start: 2025-05-26 | End: 2025-05-28

## 2025-05-26 RX ORDER — INSULIN LISPRO 100 U/ML
INJECTION, SOLUTION INTRAVENOUS; SUBCUTANEOUS AT BEDTIME
Refills: 0 | Status: DISCONTINUED | OUTPATIENT
Start: 2025-05-26 | End: 2025-05-27

## 2025-05-26 RX ADMIN — Medication 10 MG/HR: at 04:24

## 2025-05-26 RX ADMIN — Medication 240 MILLIGRAM(S): at 01:08

## 2025-05-26 RX ADMIN — CALCIUM GLUCONATE 100 GRAM(S): 20 INJECTION, SOLUTION INTRAVENOUS at 03:44

## 2025-05-26 RX ADMIN — Medication 1.53 MICROGRAM(S)/KG/MIN: at 02:44

## 2025-05-26 RX ADMIN — Medication 600 MILLIGRAM(S): at 02:00

## 2025-05-26 RX ADMIN — Medication 20 MILLIEQUIVALENT(S): at 12:56

## 2025-05-26 RX ADMIN — INSULIN LISPRO 0: 100 INJECTION, SOLUTION INTRAVENOUS; SUBCUTANEOUS at 05:24

## 2025-05-26 RX ADMIN — INSULIN LISPRO 1: 100 INJECTION, SOLUTION INTRAVENOUS; SUBCUTANEOUS at 18:50

## 2025-05-26 RX ADMIN — DEXTROMETHORPHAN HBR, GUAIFENESIN 600 MILLIGRAM(S): 200 LIQUID ORAL at 18:49

## 2025-05-26 RX ADMIN — Medication 1 APPLICATION(S): at 05:26

## 2025-05-26 NOTE — PROGRESS NOTE ADULT - ASSESSMENT
Mrs. Morales is an 89-year-old female with history of breast cancer with bilateral mastectomy presents today due to vomiting and diarrhea found to have GIB requiring admission to ICU.   Neuro: AOx3. Takes tylenol at home in the morning and at night, but is not currently c/o pain.  CV: BP now stable, off pressors.   Respiratory: CHASE  GI/Heme: UGIB. From CT 5/25/25: Foci of active GI bleeding in the lower esophagus/gastroesophageal junction, with intraluminal pooling of contrast on the delayed phase. 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 Gastric distention with complex fluid and heterogeneous debris may represent blood products. Pt is s/p 4 units pRBCs and 1 unit platelets. On protonix drip. Family would prefer to avoid intervention, if possible. Clear liquids today. Daily CBCs unless has further bleeding. Holding DVT ppx. SCDs. Maintain active type and screen and 2 large bore IVs. Will discuss with GI.  Renal: Hypernatremic. Will do D5+LR instead of LR for now.   Endocrine: No known history of diabetes. F/u hemoglobin a1c.  Ethics: DNR/DNI. Pt was on hospice, but, as per son, it was mostly for additional support. She had a significant decline after previous admission as [facility] for a fall.    Discussed with son Oliverio and granddaughter Luis Angel at bedside.

## 2025-05-26 NOTE — DIETITIAN INITIAL EVALUATION ADULT - OTHER INFO
Reason for Admission: GIB  History of Present Illness:   89-year-old female with past medical history of breast cancer with bilateral mastectomy presents today due to vomiting and diarrhea.  Patient is with aide at the bedside who reports numerous episodes of vomiting.  No prior abdominal surgery.  Patient has also been having intermittent small soft stools.    weight 89#   Ht 60" per family approximately   5/25 fecal incontinence  POCT 159 no hx DM ? A1c pending collection   IV LR 100ml hr

## 2025-05-26 NOTE — PROGRESS NOTE ADULT - ASSESSMENT
GI bleed  acute blood loss anemia    Transfuse Prbc given hypotension/elevated lactate  IV PPI  hold a/c and asa  case d/w son Oliverio at [phone] on 5/25  she has a large hiatal hernia with distended fluid filled stomach; if EGD were to be required would require general anesthesia  he would like to avoid procedures if possible  cont medical management with PPI, prbc, platelet transfusion  she is DNR/DNI  ok to start clear liquid diet

## 2025-05-26 NOTE — PROGRESS NOTE ADULT - SUBJECTIVE AND OBJECTIVE BOX
Subjective: Overnight had 2U PRBC transfused with Platelets.  Remains on pressor support and pending GI Consultation for possible EGD.     MEDICATIONS  (STANDING):  chlorhexidine 2% Cloths 1 Application(s) Topical <User Schedule>  dextrose 5%. 1000 milliLiter(s) (50 mL/Hr) IV Continuous <Continuous>  dextrose 5%. 1000 milliLiter(s) (100 mL/Hr) IV Continuous <Continuous>  dextrose 50% Injectable 25 Gram(s) IV Push once  dextrose 50% Injectable 12.5 Gram(s) IV Push once  dextrose 50% Injectable 25 Gram(s) IV Push once  glucagon  Injectable 1 milliGRAM(s) IntraMuscular once  insulin lispro (ADMELOG) corrective regimen sliding scale   SubCutaneous every 6 hours  lactated ringers. 1000 milliLiter(s) (100 mL/Hr) IV Continuous <Continuous>  pantoprazole Infusion 8 mG/Hr (10 mL/Hr) IV Continuous <Continuous>  phenylephrine    Infusion 0.1 MICROgram(s)/kG/Min (1.53 mL/Hr) IV Continuous <Continuous>  potassium chloride   Powder 20 milliEquivalent(s) Oral once    MEDICATIONS  (PRN):  dextrose Oral Gel 15 Gram(s) Oral once PRN Blood Glucose LESS THAN 70 milliGRAM(s)/deciliter      Vital Signs Last 24 Hrs  T(C): 37.5 (26 May 2025 08:09), Max: 38 (25 May 2025 22:39)  T(F): 99.5 (26 May 2025 08:09), Max: 100.4 (25 May 2025 22:39)  HR: 101 (26 May 2025 06:45) (70 - 119)  BP: 142/67 (26 May 2025 06:45) (49/24 - 149/64)  BP(mean): 87 (26 May 2025 06:45) (34 - 136)  RR: 18 (26 May 2025 06:45) (11 - 25)  SpO2: 100% (26 May 2025 06:45) (92% - 100%)    Parameters below as of 25 May 2025 19:49  Patient On (Oxygen Delivery Method): nasal cannula  O2 Flow (L/min): 2      PHYSICAL EXAM:  GENERAL: NAD  HEAD:  Atraumatic, Normocephalic  ENMT: Moist mucous membranes  NECK: Supple, No JVD, Normal thyroid  CHEST/LUNG: Clear to auscultation bilaterally  HEART: Regular rate and rhythm  ABDOMEN: Soft, Nontender, Nondistended  EXTREMITIES:  2+ Peripheral Pulses      LABS:                        12.3   16.73 )-----------( 166      ( 26 May 2025 09:30 )             36.7     26 May 2025 02:35    148    |  113    |  43     ----------------------------<  159    3.7     |  27     |  0.89     Ca    7.5        26 May 2025 02:35    TPro  6.2    /  Alb  3.1    /  TBili  0.4    /  DBili  x      /  AST  16     /  ALT  15     /  AlkPhos  70     25 May 2025 10:20    PT/INR - ( 25 May 2025 10:20 )   PT: 12.7 sec;   INR: 1.09 ratio         PTT - ( 25 May 2025 10:20 )  PTT:21.0 sec  Urinalysis Basic - ( 26 May 2025 02:35 )    Color: x / Appearance: x / SG: x / pH: x  Gluc: 159 mg/dL / Ketone: x  / Bili: x / Urobili: x   Blood: x / Protein: x / Nitrite: x   Leuk Esterase: x / RBC: x / WBC x   Sq Epi: x / Non Sq Epi: x / Bacteria: x      CAPILLARY BLOOD GLUCOSE      POCT Blood Glucose.: 133 mg/dL (26 May 2025 05:22)  POCT Blood Glucose.: 162 mg/dL (25 May 2025 23:49)

## 2025-05-26 NOTE — DIETITIAN INITIAL EVALUATION ADULT - PROBLEM SELECTOR PLAN 2
DVT ppx hold in the setting of GIB    Dispo: Hx of breast cancer, currently in HOME HOSPICE  - stopped taking statins recents  - takes ASA 81, hold  - Tylenol 1g BID for pain/ generalized aches

## 2025-05-26 NOTE — PROGRESS NOTE ADULT - SUBJECTIVE AND OBJECTIVE BOX
Progress Note    KAM FUENTES 89y (1935) Female [id number]  05-25-25 (1d)      Chief Complaint: GIB    Subjective:  Pt feeling much better. No hematemesis this morning.    Review of Systems:  CONSTITUTIONAL: No fever, weight loss, or fatigue  EYES: No eye pain, visual disturbances, or discharge  ENMT:  No difficulty hearing, tinnitus, vertigo; No sinus or throat pain  NECK: No pain or stiffness  RESPIRATORY: No cough, wheezing, chills or hemoptysis; No shortness of breath  CARDIOVASCULAR: No chest pain, palpitations, dizziness, or leg swelling  GASTROINTESTINAL: No abdominal or epigastric pain. No nausea. No vomiting this morning.   GENITOURINARY: No dysuria, frequency, hematuria, or incontinence      PAST MEDICAL & SURGICAL HISTORY:  No pertinent past medical history [[id number]]    No pertinent past medical history [[id number]]    Hypertension [I10]    No significant past surgical history [[id number]]      acetaminophen     Tablet .. 650 milliGRAM(s) Oral every 6 hours PRN  chlorhexidine 2% Cloths 1 Application(s) Topical <User Schedule>  dextrose 5% + lactated ringers. 1000 milliLiter(s) IV Continuous <Continuous>  dextrose 5%. 1000 milliLiter(s) IV Continuous <Continuous>  dextrose 5%. 1000 milliLiter(s) IV Continuous <Continuous>  dextrose 50% Injectable 25 Gram(s) IV Push once  dextrose 50% Injectable 12.5 Gram(s) IV Push once  dextrose 50% Injectable 25 Gram(s) IV Push once  dextrose Oral Gel 15 Gram(s) Oral once PRN  glucagon  Injectable 1 milliGRAM(s) IntraMuscular once  guaiFENesin  milliGRAM(s) Oral every 12 hours  insulin lispro (ADMELOG) corrective regimen sliding scale   SubCutaneous every 6 hours  pantoprazole Infusion 8 mG/Hr IV Continuous <Continuous>  phenylephrine    Infusion 0.1 MICROgram(s)/kG/Min IV Continuous <Continuous>  potassium chloride   Powder 20 milliEquivalent(s) Oral once    Objective:  T(C): 37.5 (05-26-25 @ 08:09), Max: 38 (05-25-25 @ 22:39)  HR: 101 (05-26-25 @ 06:45) (70 - 119)  BP: 142/67 (05-26-25 @ 06:45) (49/24 - 149/64)  RR: 18 (05-26-25 @ 06:45) (11 - 25)  SpO2: 100% (05-26-25 @ 06:45) (92% - 100%)    Physical exam:  GENERAL: NAD, well-developed. Face is jade.  HEAD:  Atraumatic, Normocephalic  EYES: EOMI, PERRLA, conjunctiva and sclera clear  NECK: Supple, No JVD  CHEST/LUNG: Clear to auscultation bilaterally; No wheeze  HEART: Regular rate and rhythm; No murmurs, rubs, or gallops  ABDOMEN: Soft, Nontender, Nondistended; Bowel sounds present  EXTREMITIES:  2+ Peripheral Pulses, No clubbing, cyanosis, or edema  PSYCH: AAOx3  NEUROLOGY: non-focal  SKIN: Skin is pink      05-25-25 @ 07:01  -  05-26-25 @ 07:00  --------------------------------------------------------  IN: 2185.5 mL / OUT: 700 mL / NET: 1485.5 mL        CAPILLARY BLOOD GLUCOSE      (05-26 @ 09:30)                      12.3  16.73 )-----------( 166                 36.7    Neutrophils = -- (--%)  Lymphocytes = -- (--%)  Eosinophils = -- (--%)  Basophils = -- (--%)  Monocytes = -- (--%)  Bands = --%    05-26    150[H]  |  117[H]  |  49[H]  ----------------------------<  139[H]  3.9   |  30  |  0.56    Ca    7.7[L]      26 May 2025 09:30  Phos  2.5     05-26  Mg     1.8     05-26    TPro  6.2  /  Alb  3.1[L]  /  TBili  0.4  /  DBili  x   /  AST  16  /  ALT  15  /  AlkPhos  70  05-25    ( 26 May 2025 09:30 )   PT: 13.1 sec;   INR: 1.11 ratio;       PTT:21.8 sec      Urinalysis Basic - ( 26 May 2025 09:30 )    Color: x / Appearance: x / SG: x / pH: x  Gluc: 139 mg/dL / Ketone: x  / Bili: x / Urobili: x   Blood: x / Protein: x / Nitrite: x   Leuk Esterase: x / RBC: x / WBC x   Sq Epi: x / Non Sq Epi: x / Bacteria: x        WBC Trend: 16.73<--, 22.82<--, 19.19<--    Hb Trend: 12.3<--, 10.1<--, 10.5<--        New imaging in last 24 hours: CTA  Consult notes reviewed: GI

## 2025-05-26 NOTE — PROGRESS NOTE ADULT - SUBJECTIVE AND OBJECTIVE BOX
[facility]  Octavio Bone NP  [address] [place] [address]  [phone]      INTERVAL HPI/OVERNIGHT EVENTS:  Pt s/e in ICU with family members at bedside  Pt is awake and alert. She denies any current nausea. No further vomiting so far today  Otherwise denies further GI complaints    MEDICATIONS  (STANDING):  chlorhexidine 2% Cloths 1 Application(s) Topical <User Schedule>  dextrose 5% + lactated ringers. 1000 milliLiter(s) (100 mL/Hr) IV Continuous <Continuous>  dextrose 5%. 1000 milliLiter(s) (100 mL/Hr) IV Continuous <Continuous>  dextrose 5%. 1000 milliLiter(s) (50 mL/Hr) IV Continuous <Continuous>  dextrose 50% Injectable 25 Gram(s) IV Push once  dextrose 50% Injectable 12.5 Gram(s) IV Push once  dextrose 50% Injectable 25 Gram(s) IV Push once  glucagon  Injectable 1 milliGRAM(s) IntraMuscular once  guaiFENesin  milliGRAM(s) Oral every 12 hours  insulin lispro (ADMELOG) corrective regimen sliding scale   SubCutaneous every 6 hours  pantoprazole Infusion 8 mG/Hr (10 mL/Hr) IV Continuous <Continuous>  phenylephrine    Infusion 0.1 MICROgram(s)/kG/Min (1.53 mL/Hr) IV Continuous <Continuous>  potassium chloride   Powder 20 milliEquivalent(s) Oral once    MEDICATIONS  (PRN):  acetaminophen     Tablet .. 650 milliGRAM(s) Oral every 6 hours PRN Mild Pain (1 - 3)  dextrose Oral Gel 15 Gram(s) Oral once PRN Blood Glucose LESS THAN 70 milliGRAM(s)/deciliter      Allergies    No Known Allergies        PHYSICAL EXAM:   Vital Signs:  Vital Signs Last 24 Hrs  T(C): 37.8 (26 May 2025 11:47), Max: 38 (25 May 2025 22:39)  T(F): 100 (26 May 2025 11:47), Max: 100.4 (25 May 2025 22:39)  HR: 101 (26 May 2025 06:45) (70 - 119)  BP: 142/67 (26 May 2025 06:45) (49/24 - 149/64)  BP(mean): 87 (26 May 2025 06:45) (34 - 136)  RR: 18 (26 May 2025 06:45) (11 - 25)  SpO2: 100% (26 May 2025 06:45) (92% - 100%)    Parameters below as of 25 May 2025 19:49  Patient On (Oxygen Delivery Method): nasal cannula  O2 Flow (L/min): 2    Daily     Daily Weight in k.8 (26 May 2025 04:47)    GENERAL:  Appears stated age  HEENT:  NC/AT  CHEST:  Full & symmetric excursion  HEART:  Regular rhythm  ABDOMEN:  Soft, non-tender, non-distended  EXTEREMITIES:  no cyanosis  SKIN:  No rash  NEURO:  Alert      LABS:                        12.3   16.73 )-----------( 166      ( 26 May 2025 09:30 )             36.7     05-26    150[H]  |  117[H]  |  49[H]  ----------------------------<  139[H]  3.9   |  30  |  0.56    Ca    7.7[L]      26 May 2025 09:30  Phos  2.5     -  Mg     1.8     -    TPro  6.2  /  Alb  3.1[L]  /  TBili  0.4  /  DBili  x   /  AST  16  /  ALT  15  /  AlkPhos  70  05-25    PT/INR - ( 26 May 2025 09:30 )   PT: 13.1 sec;   INR: 1.11 ratio         PTT - ( 26 May 2025 09:30 )  PTT:21.8 sec  Urinalysis Basic - ( 26 May 2025 09:30 )    Color: x / Appearance: x / SG: x / pH: x  Gluc: 139 mg/dL / Ketone: x  / Bili: x / Urobili: x   Blood: x / Protein: x / Nitrite: x   Leuk Esterase: x / RBC: x / WBC x   Sq Epi: x / Non Sq Epi: x / Bacteria: x

## 2025-05-26 NOTE — DIETITIAN INITIAL EVALUATION ADULT - PERTINENT LABORATORY DATA
05-26    148[H]  |  113[H]  |  43[H]  ----------------------------<  159[H]  3.7   |  27  |  0.89    Ca    7.5[L]      26 May 2025 02:35    TPro  6.2  /  Alb  3.1[L]  /  TBili  0.4  /  DBili  x   /  AST  16  /  ALT  15  /  AlkPhos  70  05-25  POCT Blood Glucose.: 133 mg/dL (05-26-25 @ 05:22)

## 2025-05-26 NOTE — PROGRESS NOTE ADULT - SUBJECTIVE AND OBJECTIVE BOX
Patient is a 89y old  Female who presents with a chief complaint of GIB (26 May 2025 12:15)    BRIEF HOSPITAL COURSE: 89 year old female with a PMH of breast CA s/p mastectomy who presented to the ED with hematemesis and diarrhea.  CT w/ foci of active GI bleeding in the lower esophagus/GE junction. Hypotensive in the ED unresponsive to IVF and was initiated on vasopressors.  ADmitte o ICU for further management.     PAST MEDICAL & SURGICAL HISTORY:  Hypertension    Medications:  guaiFENesin  milliGRAM(s) Oral every 12 hours  acetaminophen     Tablet .. 650 milliGRAM(s) Oral every 6 hours PRN  pantoprazole Infusion 8 mG/Hr IV Continuous <Continuous>  dextrose 50% Injectable 25 Gram(s) IV Push once  dextrose 50% Injectable 12.5 Gram(s) IV Push once  dextrose 50% Injectable 25 Gram(s) IV Push once  dextrose Oral Gel 15 Gram(s) Oral once PRN  glucagon  Injectable 1 milliGRAM(s) IntraMuscular once  insulin lispro (ADMELOG) corrective regimen sliding scale   SubCutaneous every 6 hours  dextrose 5% + lactated ringers. 1000 milliLiter(s) IV Continuous <Continuous>  dextrose 5%. 1000 milliLiter(s) IV Continuous <Continuous>  dextrose 5%. 1000 milliLiter(s) IV Continuous <Continuous>  chlorhexidine 2% Cloths 1 Application(s) Topical <User Schedule>        ICU Vital Signs Last 24 Hrs  T(C): 36.9 (26 May 2025 20:04), Max: 38 (25 May 2025 22:39)  T(F): 98.5 (26 May 2025 20:04), Max: 100.4 (25 May 2025 22:39)  HR: 98 (26 May 2025 21:00) (89 - 119)  BP: 152/59 (26 May 2025 21:00) (49/24 - 163/78)  BP(mean): 85 (26 May 2025 21:00) (34 - 136)  ABP: --  ABP(mean): --  RR: 18 (26 May 2025 21:00) (0 - 25)  SpO2: 93% (26 May 2025 21:00) (92% - 100%)    O2 Parameters below as of 26 May 2025 19:30  Patient On (Oxygen Delivery Method): room air                I&O's Detail    25 May 2025 07:01  -  26 May 2025 07:00  --------------------------------------------------------  IN:    IV PiggyBack: 60 mL    IV PiggyBack: 50 mL    Lactated Ringers: 1000 mL    Pantoprazole: 100 mL    Phenylephrine: 145.5 mL    PRBCs (Packed Red Blood Cells): 330 mL    Sodium Chloride 0.9% Bolus: 500 mL  Total IN: 2185.5 mL    OUT:    Voided (mL): 700 mL  Total OUT: 700 mL    Total NET: 1485.5 mL      26 May 2025 07:01  -  26 May 2025 21:35  --------------------------------------------------------  IN:    dextrose 5% + lactated ringers: 500 mL    Lactated Ringers: 1100 mL    Pantoprazole: 140 mL  Total IN: 1740 mL    OUT:    Phenylephrine: 0 mL    Voided (mL): 850 mL  Total OUT: 850 mL    Total NET: 890 mL            LABS:                        12.9   17.95 )-----------( 146      ( 26 May 2025 20:55 )             37.8     05-26    150[H]  |  117[H]  |  49[H]  ----------------------------<  139[H]  3.9   |  30  |  0.56    Ca    7.7[L]      26 May 2025 09:30  Phos  2.5     05-26  Mg     1.8     05-26    TPro  6.2  /  Alb  3.1[L]  /  TBili  0.4  /  DBili  x   /  AST  16  /  ALT  15  /  AlkPhos  70  05-25          CAPILLARY BLOOD GLUCOSE      POCT Blood Glucose.: 195 mg/dL (26 May 2025 18:48)    PT/INR - ( 26 May 2025 09:30 )   PT: 13.1 sec;   INR: 1.11 ratio         PTT - ( 26 May 2025 09:30 )  PTT:21.8 sec  Urinalysis Basic - ( 26 May 2025 09:30 )    Color: x / Appearance: x / SG: x / pH: x  Gluc: 139 mg/dL / Ketone: x  / Bili: x / Urobili: x   Blood: x / Protein: x / Nitrite: x   Leuk Esterase: x / RBC: x / WBC x   Sq Epi: x / Non Sq Epi: x / Bacteria: x      CULTURES:  Culture Results:   No growth at 24 hours (05-25 @ 10:20)  Culture Results:   No growth at 24 hours (05-25 @ 10:10)      Physical Examination:    General: Elderly female, no acute distress     PULM: Clear to auscultation bilaterally    CVS: Regular rate and rhythm    ABD: Soft, nondistended, nontender    EXT: No edema, nontender    NEURO: Alert, oriented, interactive, nonfocal      < from: CT Abdomen and Pelvis w/ IV Cont (05.25.25 @ 12:30) >  IMPRESSION:  Foci of active GI bleeding in the lower esophagus/gastroesophageal   junction, with intraluminal pooling of contrast on the delayed phase   images.    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 Gastric distention with   complex fluid and heterogeneous debris may represent blood products.    < end of copied text >

## 2025-05-26 NOTE — DIETITIAN INITIAL EVALUATION ADULT - ETIOLOGY
related to alteration in GI tract function/structure related to presumed inadequate energy intake in setting of age

## 2025-05-26 NOTE — DIETITIAN INITIAL EVALUATION ADULT - ORAL INTAKE PTA/DIET HISTORY
patient seen resting in bed family at bedside with private aide. patient from home eating soft solid foods PTA sandwiches tuna salmon with quintero.   likes tea. has her own teeth . no food allergies. last number of years weight  has been stable. always has been light weight of ~ 100# the most she weighted   patient noted home with hospice

## 2025-05-26 NOTE — PROGRESS NOTE ADULT - ASSESSMENT
89F Breast CA and HTN who was on Home Hospice admitted for Acute Blood Loss Anemia and Hypovolemic Shock     Acute Blood Loss Anemia / Hypovolemic Shock  S/P Transfusion of 2U PRBC + 1U Platelets  BUN elevated due to upper GI Bleed most likely  Remains on Pressor Support and titrating accordingly  PPI Infusion and monitor HgB   GI Consultation and possible discussions for EGD    Breast CA  History of Bilateral Mastectomy  Was on Home Hospice     HTN  Hold all anti-hypertensives given Acute Blood Loss    Diet  Clears    DVT Prophylaxis  Pharm agents contraindicated    Disposition   Discharge planning pending hospital course

## 2025-05-26 NOTE — PROGRESS NOTE ADULT - ASSESSMENT
89 year old female with a PMH of breast CA s/p mastectomy who presented to the ED with hematemesis and diarrhea.    Problem list:  GI bleed  Hypovolemic shock   Lactic acidosis  HTN    -Pressors weaned off.  Now becoming hypertensive  -No antihypertensives in med rec but may need to initiate if continues to run high   -Ongoing episodes of melena tonight.  Repeat CBC with stable H/H  -Transfuse PRBCs for hgb <7 or hemodynamic instability  -Continue PPI infusion  -Clear liquid diet  -ASA/chem DVT ppx on hold  -ISS qac and qhs     Time spent on this patient encounter, which includes documenting this note in the electronic medical record, was 55 minutes including assessing the presenting problems with associated risks, reviewing the medical record to prepare for the encounter, and meeting face to face with patient to obtain additional history. I have also performed an appropriate physical exam, made interventions listed and ordered and interpreted appropriate diagnostic studies as documented. To improve communication and patient safety, I have coordinated care with the multidisciplinary team including the bedside nurse, appropriate attending of record and consultants as needed.  Date of entry of this note is equal to the date of services rendered.

## 2025-05-26 NOTE — DIETITIAN INITIAL EVALUATION ADULT - PERTINENT MEDS FT
MEDICATIONS  (STANDING):  chlorhexidine 2% Cloths 1 Application(s) Topical <User Schedule>  dextrose 5%. 1000 milliLiter(s) (50 mL/Hr) IV Continuous <Continuous>  dextrose 5%. 1000 milliLiter(s) (100 mL/Hr) IV Continuous <Continuous>  dextrose 50% Injectable 25 Gram(s) IV Push once  dextrose 50% Injectable 12.5 Gram(s) IV Push once  dextrose 50% Injectable 25 Gram(s) IV Push once  glucagon  Injectable 1 milliGRAM(s) IntraMuscular once  insulin lispro (ADMELOG) corrective regimen sliding scale   SubCutaneous every 6 hours  lactated ringers. 1000 milliLiter(s) (100 mL/Hr) IV Continuous <Continuous>  pantoprazole Infusion 8 mG/Hr (10 mL/Hr) IV Continuous <Continuous>  phenylephrine    Infusion 0.1 MICROgram(s)/kG/Min (1.53 mL/Hr) IV Continuous <Continuous>  potassium chloride   Powder 20 milliEquivalent(s) Oral once    MEDICATIONS  (PRN):  dextrose Oral Gel 15 Gram(s) Oral once PRN Blood Glucose LESS THAN 70 milliGRAM(s)/deciliter

## 2025-05-27 LAB
ALBUMIN SERPL ELPH-MCNC: 2.6 G/DL — LOW (ref 3.3–5)
ALP SERPL-CCNC: 56 U/L — SIGNIFICANT CHANGE UP (ref 40–120)
ALT FLD-CCNC: 17 U/L — SIGNIFICANT CHANGE UP (ref 12–78)
ANION GAP SERPL CALC-SCNC: 4 MMOL/L — LOW (ref 5–17)
AST SERPL-CCNC: 24 U/L — SIGNIFICANT CHANGE UP (ref 15–37)
BASOPHILS # BLD AUTO: 0.02 K/UL — SIGNIFICANT CHANGE UP (ref 0–0.2)
BASOPHILS NFR BLD AUTO: 0.1 % — SIGNIFICANT CHANGE UP (ref 0–2)
BILIRUB SERPL-MCNC: 0.5 MG/DL — SIGNIFICANT CHANGE UP (ref 0.2–1.2)
BUN SERPL-MCNC: 11 MG/DL — SIGNIFICANT CHANGE UP (ref 7–23)
CALCIUM SERPL-MCNC: 8.3 MG/DL — LOW (ref 8.5–10.1)
CHLORIDE SERPL-SCNC: 112 MMOL/L — HIGH (ref 96–108)
CO2 SERPL-SCNC: 33 MMOL/L — HIGH (ref 22–31)
CREAT SERPL-MCNC: 0.39 MG/DL — LOW (ref 0.5–1.3)
EGFR: 95 ML/MIN/1.73M2 — SIGNIFICANT CHANGE UP
EGFR: 95 ML/MIN/1.73M2 — SIGNIFICANT CHANGE UP
EOSINOPHIL # BLD AUTO: 0.03 K/UL — SIGNIFICANT CHANGE UP (ref 0–0.5)
EOSINOPHIL NFR BLD AUTO: 0.2 % — SIGNIFICANT CHANGE UP (ref 0–6)
GLUCOSE SERPL-MCNC: 152 MG/DL — HIGH (ref 70–99)
HCT VFR BLD CALC: 34.2 % — LOW (ref 34.5–45)
HCT VFR BLD CALC: 36.8 % — SIGNIFICANT CHANGE UP (ref 34.5–45)
HGB BLD-MCNC: 11.8 G/DL — SIGNIFICANT CHANGE UP (ref 11.5–15.5)
HGB BLD-MCNC: 12.5 G/DL — SIGNIFICANT CHANGE UP (ref 11.5–15.5)
IMM GRANULOCYTES NFR BLD AUTO: 0.6 % — SIGNIFICANT CHANGE UP (ref 0–0.9)
LYMPHOCYTES # BLD AUTO: 1.36 K/UL — SIGNIFICANT CHANGE UP (ref 1–3.3)
LYMPHOCYTES # BLD AUTO: 9.5 % — LOW (ref 13–44)
MAGNESIUM SERPL-MCNC: 1.7 MG/DL — SIGNIFICANT CHANGE UP (ref 1.6–2.6)
MCHC RBC-ENTMCNC: 31.1 PG — SIGNIFICANT CHANGE UP (ref 27–34)
MCHC RBC-ENTMCNC: 32 PG — SIGNIFICANT CHANGE UP (ref 27–34)
MCHC RBC-ENTMCNC: 34 G/DL — SIGNIFICANT CHANGE UP (ref 32–36)
MCHC RBC-ENTMCNC: 34.5 G/DL — SIGNIFICANT CHANGE UP (ref 32–36)
MCV RBC AUTO: 91.5 FL — SIGNIFICANT CHANGE UP (ref 80–100)
MCV RBC AUTO: 92.7 FL — SIGNIFICANT CHANGE UP (ref 80–100)
MONOCYTES # BLD AUTO: 0.92 K/UL — HIGH (ref 0–0.9)
MONOCYTES NFR BLD AUTO: 6.4 % — SIGNIFICANT CHANGE UP (ref 2–14)
MRSA PCR RESULT.: SIGNIFICANT CHANGE UP
NEUTROPHILS # BLD AUTO: 11.9 K/UL — HIGH (ref 1.8–7.4)
NEUTROPHILS NFR BLD AUTO: 83.2 % — HIGH (ref 43–77)
NRBC BLD AUTO-RTO: 0 /100 WBCS — SIGNIFICANT CHANGE UP (ref 0–0)
NRBC BLD AUTO-RTO: 0 /100 WBCS — SIGNIFICANT CHANGE UP (ref 0–0)
PHOSPHATE SERPL-MCNC: 1.2 MG/DL — LOW (ref 2.5–4.5)
PLATELET # BLD AUTO: 152 K/UL — SIGNIFICANT CHANGE UP (ref 150–400)
PLATELET # BLD AUTO: 153 K/UL — SIGNIFICANT CHANGE UP (ref 150–400)
POTASSIUM SERPL-MCNC: 3.2 MMOL/L — LOW (ref 3.5–5.3)
POTASSIUM SERPL-SCNC: 3.2 MMOL/L — LOW (ref 3.5–5.3)
PROT SERPL-MCNC: 5.3 G/DL — LOW (ref 6–8.3)
RBC # BLD: 3.69 M/UL — LOW (ref 3.8–5.2)
RBC # BLD: 4.02 M/UL — SIGNIFICANT CHANGE UP (ref 3.8–5.2)
RBC # FLD: 17.9 % — HIGH (ref 10.3–14.5)
RBC # FLD: 18.1 % — HIGH (ref 10.3–14.5)
S AUREUS DNA NOSE QL NAA+PROBE: SIGNIFICANT CHANGE UP
SODIUM SERPL-SCNC: 149 MMOL/L — HIGH (ref 135–145)
WBC # BLD: 14.08 K/UL — HIGH (ref 3.8–10.5)
WBC # BLD: 14.32 K/UL — HIGH (ref 3.8–10.5)
WBC # FLD AUTO: 14.08 K/UL — HIGH (ref 3.8–10.5)
WBC # FLD AUTO: 14.32 K/UL — HIGH (ref 3.8–10.5)

## 2025-05-27 PROCEDURE — 99233 SBSQ HOSP IP/OBS HIGH 50: CPT | Mod: GC

## 2025-05-27 PROCEDURE — 99232 SBSQ HOSP IP/OBS MODERATE 35: CPT

## 2025-05-27 RX ORDER — POTASSIUM PHOSPHATE, MONOBASIC POTASSIUM PHOSPHATE, DIBASIC INJECTION, 236; 224 MG/ML; MG/ML
30 SOLUTION, CONCENTRATE INTRAVENOUS ONCE
Refills: 0 | Status: COMPLETED | OUTPATIENT
Start: 2025-05-27 | End: 2025-05-27

## 2025-05-27 RX ORDER — SIMETHICONE 80 MG
80 TABLET,CHEWABLE ORAL
Refills: 0 | Status: DISCONTINUED | OUTPATIENT
Start: 2025-05-27 | End: 2025-05-28

## 2025-05-27 RX ADMIN — POTASSIUM PHOSPHATE, MONOBASIC POTASSIUM PHOSPHATE, DIBASIC INJECTION, 83.33 MILLIMOLE(S): 236; 224 SOLUTION, CONCENTRATE INTRAVENOUS at 08:12

## 2025-05-27 RX ADMIN — Medication 80 MILLIGRAM(S): at 17:41

## 2025-05-27 RX ADMIN — Medication 650 MILLIGRAM(S): at 20:28

## 2025-05-27 RX ADMIN — Medication 10 MG/HR: at 00:26

## 2025-05-27 RX ADMIN — DEXTROMETHORPHAN HBR, GUAIFENESIN 600 MILLIGRAM(S): 200 LIQUID ORAL at 05:22

## 2025-05-27 RX ADMIN — Medication 40 MILLIGRAM(S): at 17:41

## 2025-05-27 RX ADMIN — SODIUM CHLORIDE 100 MILLILITER(S): 9 INJECTION, SOLUTION INTRAVENOUS at 00:26

## 2025-05-27 RX ADMIN — Medication 40 MILLIEQUIVALENT(S): at 08:44

## 2025-05-27 RX ADMIN — Medication 1 APPLICATION(S): at 05:27

## 2025-05-27 RX ADMIN — Medication 200 MILLIGRAM(S): at 20:12

## 2025-05-27 NOTE — CHART NOTE - NSCHARTNOTEFT_GEN_A_CORE
Chart reviewed, consult received to understanding "hospice status."  No care coordination or SW note  Outreach made to [facility]- patient not under their service.    Would recommend SW discussion with patient/family to understand what agency patient may be signed on and then contacting their admissions department to notify them of admission.  No further role for palliative identified. Discussed with Dr. Moreno    Please reconsult if needs arise.  Yuliet Meraz MD, [facility]; Palliative Care Attending, [phone]

## 2025-05-27 NOTE — PROGRESS NOTE ADULT - ASSESSMENT
Mrs. Morales is an 89-year-old female with history of breast cancer with bilateral mastectomy presents today due to vomiting and diarrhea found to have GIB requiring admission to ICU.     Neuro:  - AOx3.  -Takes tylenol @ home PRN for pain but is currently comfortable.     CV:   -BP now stable, off pressors. Monitor routine hemodynamics.   -EKG nonischemic. No Cp or SOB.  -Volume - euvolemic.   Rhythm No issues    Respiratory:   -Saturating well on RA.    GI/Heme:   UGIB. From CT 5/25/25: Foci of active GI bleeding in the lower esophagus/gastroesophageal junction, with intraluminal pooling of contrast on the delayed phase. 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 Gastric distention with complex fluid and heterogeneous debris may represent blood products.  - Pt is s/p 4 units pRBCs and 1 unit platelets.   -s/p protonix gtt now on 40mg IV BID  -Diet advancing, tolerating well.   - Daily CBCs unless has further bleeding.   -Holding DVT ppx. SCDs.   -Maintain active type and screen and 2 large bore IVs.   -Will discuss with GI.    Renal:   -Hypernatremic.   -s/p D5+L5, now tolerating diet.   -Daily CMPs      Endocrine:   -No known history of diabetes.  - A1C 5.6    Ethics: DNR/DNI. Pt was on hospice, but, as per son, it was mostly for additional support. She had a significant decline after previous admission as [facility] for a fall.

## 2025-05-27 NOTE — CASE MANAGEMENT PROGRESS NOTE - NSCMPROGRESSNOTE_GEN_ALL_CORE
Patient discussed during rounds and remains acute in ICU on room air. Dx: GI bleed s/p transfusion of 2U PRBCs + 1U Platelets. Patient receiving IV Protonix.  Patient with home hospice, SW following. CM will continue to collaborate with interdisciplinary team and remain available to assist.

## 2025-05-27 NOTE — CARE COORDINATION ASSESSMENT. - REASON FOR CONSULT
Met with pt and private hire aide at bedside in order to conduct assessment and to discuss SW roles with good understanding./coordination of care/discharge planning

## 2025-05-27 NOTE — PROGRESS NOTE ADULT - SUBJECTIVE AND OBJECTIVE BOX
[facility]  Octavio Bone NP  [address] [place] [address]  [phone]      INTERVAL HPI/OVERNIGHT EVENTS:  Pt s/e in ICU with aid at bedside  Pt reports no further nausea. She is tolerating soft and bite sized diet.  Her only complaint today is abdominal bloating from gas  Hgb noted, stable    MEDICATIONS  (STANDING):  chlorhexidine 2% Cloths 1 Application(s) Topical <User Schedule>  guaiFENesin  milliGRAM(s) Oral every 12 hours  pantoprazole  Injectable 40 milliGRAM(s) IV Push every 12 hours    MEDICATIONS  (PRN):  acetaminophen     Tablet .. 650 milliGRAM(s) Oral every 6 hours PRN Mild Pain (1 - 3)      Allergies    No Known Allergies      PHYSICAL EXAM:   Vital Signs:  Vital Signs Last 24 Hrs  T(C): 37.4 (27 May 2025 08:07), Max: 37.9 (26 May 2025 12:30)  T(F): 99.3 (27 May 2025 08:07), Max: 100.2 (26 May 2025 12:30)  HR: 92 (27 May 2025 09:00) (77 - 108)  BP: 132/119 (27 May 2025 09:00) (108/66 - 163/78)  BP(mean): 125 (27 May 2025 09:00) (67 - 129)  RR: 16 (27 May 2025 06:00) (0 - 18)  SpO2: 96% (27 May 2025 09:00) (91% - 100%)    Parameters below as of 26 May 2025 19:30  Patient On (Oxygen Delivery Method): room air      Daily     Daily Weight in k.6 (27 May 2025 03:00)    GENERAL:  Appears stated age  HEENT:  NC/AT  CHEST:  Full & symmetric excursion  HEART:  Regular rhythm  ABDOMEN:  Soft, non-tender, non-distended  EXTEREMITIES:  no cyanosis  SKIN:  No rash  NEURO:  Alert      LABS:                        12.5   14.32 )-----------( 152      ( 27 May 2025 05:40 )             36.8         149[H]  |  112[H]  |  11  ----------------------------<  152[H]  3.2[L]   |  33[H]  |  0.39[L]    Ca    8.3[L]      27 May 2025 05:40  Phos  1.2       Mg     1.7         TPro  5.3[L]  /  Alb  2.6[L]  /  TBili  0.5  /  DBili  x   /  AST  24  /  ALT  17  /  AlkPhos  56      PT/INR - ( 26 May 2025 09:30 )   PT: 13.1 sec;   INR: 1.11 ratio         PTT - ( 26 May 2025 09:30 )  PTT:21.8 sec  Urinalysis Basic - ( 27 May 2025 05:40 )    Color: x / Appearance: x / SG: x / pH: x  Gluc: 152 mg/dL / Ketone: x  / Bili: x / Urobili: x   Blood: x / Protein: x / Nitrite: x   Leuk Esterase: x / RBC: x / WBC x   Sq Epi: x / Non Sq Epi: x / Bacteria: x

## 2025-05-27 NOTE — PROGRESS NOTE ADULT - ATTENDING COMMENTS
Mrs. Morales is an 89-year-old female with history of breast cancer with bilateral mastectomy presents today due to vomiting and diarrhea found to have GIB requiring admission to ICU.   Neuro: AOx3. Takes tylenol at home in the morning and at night, but is not currently c/o pain.  CV: BP now stable, even hypertensive, off pressors.   Respiratory: CHASE  GI/Heme: UGIB. From CT 5/25/25: Foci of active GI bleeding in the lower esophagus/gastroesophageal junction, with intraluminal pooling of contrast on the delayed phase. 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 Gastric distention with complex fluid and heterogeneous debris may represent blood products. Pt is s/p 4 units pRBCs and 1 unit platelets. On protonix BID. Family would prefer to avoid intervention, if possible. Diet advanced to soft and bite sized. Daily CBCs unless has further bleeding. Holding DVT ppx. SCDs. Maintain active type and screen and 2 large bore IVs. Will discuss with GI.  Renal: Hypernatremic. Patient eating well. Will encourage free water orally. Hypokalemic and hypophosphatemic. Repleted. F/u repeat labs.  Endocrine: No known history of diabetes. Hemoglobin a1c WNL.  Ethics: DNR/DNI. Pt was on hospice, but, as per son, it was mostly for additional support. She had a significant decline after previous admission as [facility] for a fall. D/w social work.    Patient stable for downgrade to telemetry.

## 2025-05-27 NOTE — PROGRESS NOTE ADULT - ASSESSMENT
GI bleed  acute blood loss anemia    s/p 4u pRBC transfusion, hgb now stable  IV PPI  hold a/c and asa  case d/w son Oliverio at [phone] on 5/25  she has a large hiatal hernia with distended fluid filled stomach; if EGD were to be required would require general anesthesia  he would like to avoid procedures if possible  cont medical management with PPI  soft and bite sized diet as tolerated  ideally would hold a/c for 2 weeks

## 2025-05-27 NOTE — PROGRESS NOTE ADULT - SUBJECTIVE AND OBJECTIVE BOX
Subjective: Off Pressors and now BP increasing. Continues to have Melenotic BM's. No complaints.     MEDICATIONS  (STANDING):  chlorhexidine 2% Cloths 1 Application(s) Topical <User Schedule>  guaiFENesin  milliGRAM(s) Oral every 12 hours  pantoprazole  Injectable 40 milliGRAM(s) IV Push every 12 hours  simethicone 80 milliGRAM(s) Chew two times a day    MEDICATIONS  (PRN):  acetaminophen     Tablet .. 650 milliGRAM(s) Oral every 6 hours PRN Mild Pain (1 - 3)      Vital Signs Last 24 Hrs  T(C): 37.4 (27 May 2025 08:07), Max: 37.9 (26 May 2025 12:30)  T(F): 99.3 (27 May 2025 08:07), Max: 100.2 (26 May 2025 12:30)  HR: 92 (27 May 2025 09:00) (77 - 108)  BP: 132/119 (27 May 2025 09:00) (108/66 - 163/78)  BP(mean): 125 (27 May 2025 09:00) (67 - 129)  RR: 16 (27 May 2025 06:00) (0 - 18)  SpO2: 96% (27 May 2025 09:00) (91% - 100%)    Parameters below as of 26 May 2025 19:30  Patient On (Oxygen Delivery Method): room air        PHYSICAL EXAM:  GENERAL: NAD  HEAD:  Atraumatic, Normocephalic  ENMT: Moist mucous membranes  NECK: Supple, No JVD, Normal thyroid  CHEST/LUNG: Clear to auscultation bilaterally  HEART: Regular rate and rhythm  ABDOMEN: Soft, Nontender, Nondistended  EXTREMITIES:  2+ Peripheral Pulses      LABS:                        12.5   14.32 )-----------( 152      ( 27 May 2025 05:40 )             36.8     27 May 2025 05:40    149    |  112    |  11     ----------------------------<  152    3.2     |  33     |  0.39     Ca    8.3        27 May 2025 05:40  Phos  1.2       27 May 2025 05:40  Mg     1.7       27 May 2025 05:40    TPro  5.3    /  Alb  2.6    /  TBili  0.5    /  DBili  x      /  AST  24     /  ALT  17     /  AlkPhos  56     27 May 2025 05:40    PT/INR - ( 26 May 2025 09:30 )   PT: 13.1 sec;   INR: 1.11 ratio         PTT - ( 26 May 2025 09:30 )  PTT:21.8 sec  Urinalysis Basic - ( 27 May 2025 05:40 )    Color: x / Appearance: x / SG: x / pH: x  Gluc: 152 mg/dL / Ketone: x  / Bili: x / Urobili: x   Blood: x / Protein: x / Nitrite: x   Leuk Esterase: x / RBC: x / WBC x   Sq Epi: x / Non Sq Epi: x / Bacteria: x      CAPILLARY BLOOD GLUCOSE      POCT Blood Glucose.: 143 mg/dL (27 May 2025 07:40)  POCT Blood Glucose.: 116 mg/dL (26 May 2025 21:58)  POCT Blood Glucose.: 195 mg/dL (26 May 2025 18:48)  POCT Blood Glucose.: 118 mg/dL (26 May 2025 12:35)

## 2025-05-27 NOTE — PROGRESS NOTE ADULT - SUBJECTIVE AND OBJECTIVE BOX
Interval Events: Pt seen and examined. Afebrile overnight. No acute overnight events. She feels well today and requesting to eat more. She admits to slight abdominal pain but her exam is nonfocal. She was seen tolerating breakfast.     Review of Systems:  Constitutional: No fever, chills, fatigue  Neuro: No headache, numbness, weakness  Resp: No cough, wheezing, shortness of breath  CVS: No chest pain, palpitations, leg swelling  GI: No abdominal pain, nausea, vomiting, diarrhea   : No dysuria, frequency, incontinence  Skin: No itching, burning, rashes, or lesions   Msk: No joint pain or swelling  Psych: No depression, anxiety, mood swings    ICU Vital Signs Last 24 Hrs  T(C): 37.4 (27 May 2025 08:07), Max: 37.9 (26 May 2025 12:30)  T(F): 99.3 (27 May 2025 08:07), Max: 100.2 (26 May 2025 12:30)  HR: 92 (27 May 2025 08:00) (77 - 108)  BP: 154/65 (27 May 2025 07:00) (108/66 - 163/78)  BP(mean): 88 (27 May 2025 07:00) (67 - 129)  ABP: --  ABP(mean): --  RR: 16 (27 May 2025 06:00) (0 - 19)  SpO2: 94% (27 May 2025 08:00) (91% - 100%)    O2 Parameters below as of 26 May 2025 19:30  Patient On (Oxygen Delivery Method): room air              05-26-25 @ 07:01  -  05-27-25 @ 07:00  --------------------------------------------------------  IN: 2730 mL / OUT: 1650 mL / NET: 1080 mL        CAPILLARY BLOOD GLUCOSE      POCT Blood Glucose.: 143 mg/dL (27 May 2025 07:40)      I&O's Summary    26 May 2025 07:01  -  27 May 2025 07:00  --------------------------------------------------------  IN: 2730 mL / OUT: 1650 mL / NET: 1080 mL        Physical Exam:   Gen:   Neuro:   HEENT:  Resp:  CVS:  Abd:  Ext:  Skin:     Meds:        guaiFENesin ER Oral    acetaminophen     Tablet .. Oral PRN        pantoprazole  Injectable IV Push          chlorhexidine 2% Cloths Topical                              12.5   14.32 )-----------( 152      ( 27 May 2025 05:40 )             36.8       05-27    149[H]  |  112[H]  |  11  ----------------------------<  152[H]  3.2[L]   |  33[H]  |  0.39[L]    Ca    8.3[L]      27 May 2025 05:40  Phos  1.2     05-27  Mg     1.7     05-27    TPro  5.3[L]  /  Alb  2.6[L]  /  TBili  0.5  /  DBili  x   /  AST  24  /  ALT  17  /  AlkPhos  56  05-27          PT/INR - ( 26 May 2025 09:30 )   PT: 13.1 sec;   INR: 1.11 ratio         PTT - ( 26 May 2025 09:30 )  PTT:21.8 sec  Urinalysis Basic - ( 27 May 2025 05:40 )    Color: x / Appearance: x / SG: x / pH: x  Gluc: 152 mg/dL / Ketone: x  / Bili: x / Urobili: x   Blood: x / Protein: x / Nitrite: x   Leuk Esterase: x / RBC: x / WBC x   Sq Epi: x / Non Sq Epi: x / Bacteria: x      Blood Blood-Peripheral   No growth at 24 hours -- 05-25 @ 10:20  Blood Blood-Peripheral   No growth at 24 hours -- 05-25 @ 10:10          Culture Results:   No growth at 24 hours (05-25-25 @ 10:20)  Culture Results:   No growth at 24 hours (05-25-25 @ 10:10)          Radiology:    Xray Chest 1 View- PORTABLE-Urgent:   ACC: [id number] EXAM:  XR CHEST PORTABLE URGENT 1V   ORDERED BY: DANNY GONZALEZ     PROCEDURE DATE:  05/25/2025          INTERPRETATION:  Clinical History: Sepsis    Frontal examination of the chest demonstrates the heart to be within   normal limits in transverse diameter. Hiatal hernia. No acute   infiltrates. Dextroscoliosis thoracic spine.    IMPRESSION: No acute infiltrates.    --- End of Report ---            JOSEPH GALLO MD; Attending Radiologist  This document has been electronically signed. May 25 2025 11:58AM (05-25-25 @ 11:21)      Bedside Ultrasound:    Tubes/Lines:      GLOBAL ISSUE/BEST PRACTICE:  Analgesia:  Sedation:  HOB elevation: Y  Stress ulcer prophylaxis:  VTE prophylaxis:  Glycemic control:  Nutrition:    CODE STATUS:        Interval Events: Pt seen and examined. Afebrile overnight. No acute overnight events. She feels well today and requesting to eat more. She admits to slight abdominal pain but her exam is nonfocal. She was seen tolerating breakfast without nausea or vomiting, and still wanting more food.    Review of Systems:  Constitutional: No fever, chills, fatigue  Neuro: No headache, numbness, weakness  Resp: No cough, wheezing, shortness of breath  CVS: No chest pain, palpitations, leg swelling  GI: +++abdominal pain. No nausea, vomiting, diarrhea   : No dysuria, frequency, incontinence  Skin: No itching, burning, rashes, or lesions   Msk: No joint pain or swelling  Psych: No depression, anxiety, mood swings    ICU Vital Signs Last 24 Hrs  T(C): 37.4 (27 May 2025 08:07), Max: 37.9 (26 May 2025 12:30)  T(F): 99.3 (27 May 2025 08:07), Max: 100.2 (26 May 2025 12:30)  HR: 92 (27 May 2025 08:00) (77 - 108)  BP: 154/65 (27 May 2025 07:00) (108/66 - 163/78)  BP(mean): 88 (27 May 2025 07:00) (67 - 129)  ABP: --  ABP(mean): --  RR: 16 (27 May 2025 06:00) (0 - 19)  SpO2: 94% (27 May 2025 08:00) (91% - 100%)    O2 Parameters below as of 26 May 2025 19:30  Patient On (Oxygen Delivery Method): room air              05-26-25 @ 07:01  -  05-27-25 @ 07:00  --------------------------------------------------------  IN: 2730 mL / OUT: 1650 mL / NET: 1080 mL        CAPILLARY BLOOD GLUCOSE      POCT Blood Glucose.: 143 mg/dL (27 May 2025 07:40)      I&O's Summary    26 May 2025 07:01  -  27 May 2025 07:00  --------------------------------------------------------  IN: 2730 mL / OUT: 1650 mL / NET: 1080 mL        Physical Exam:   Gen:   Neuro: Elderly female, NAD  HEENT: NC/AT  Resp: Lungs CTA BL. No accessory m. use. Speaking in full sentences  CVS: +s1, S2. RRR.   Abd:  Ext:  Skin:     Meds:        guaiFENesin ER Oral    acetaminophen     Tablet .. Oral PRN        pantoprazole  Injectable IV Push          chlorhexidine 2% Cloths Topical                              12.5   14.32 )-----------( 152      ( 27 May 2025 05:40 )             36.8       05-27    149[H]  |  112[H]  |  11  ----------------------------<  152[H]  3.2[L]   |  33[H]  |  0.39[L]    Ca    8.3[L]      27 May 2025 05:40  Phos  1.2     05-27  Mg     1.7     05-27    TPro  5.3[L]  /  Alb  2.6[L]  /  TBili  0.5  /  DBili  x   /  AST  24  /  ALT  17  /  AlkPhos  56  05-27          PT/INR - ( 26 May 2025 09:30 )   PT: 13.1 sec;   INR: 1.11 ratio         PTT - ( 26 May 2025 09:30 )  PTT:21.8 sec  Urinalysis Basic - ( 27 May 2025 05:40 )    Color: x / Appearance: x / SG: x / pH: x  Gluc: 152 mg/dL / Ketone: x  / Bili: x / Urobili: x   Blood: x / Protein: x / Nitrite: x   Leuk Esterase: x / RBC: x / WBC x   Sq Epi: x / Non Sq Epi: x / Bacteria: x      Blood Blood-Peripheral   No growth at 24 hours -- 05-25 @ 10:20  Blood Blood-Peripheral   No growth at 24 hours -- 05-25 @ 10:10          Culture Results:   No growth at 24 hours (05-25-25 @ 10:20)  Culture Results:   No growth at 24 hours (05-25-25 @ 10:10)          Radiology:    Xray Chest 1 View- PORTABLE-Urgent:   ACC: [id number] EXAM:  XR CHEST PORTABLE URGENT 1V   ORDERED BY: DANNY GONZALEZ     PROCEDURE DATE:  05/25/2025          INTERPRETATION:  Clinical History: Sepsis    Frontal examination of the chest demonstrates the heart to be within   normal limits in transverse diameter. Hiatal hernia. No acute   infiltrates. Dextroscoliosis thoracic spine.    IMPRESSION: No acute infiltrates.    --- End of Report ---            JOSEPH GALLO MD; Attending Radiologist  This document has been electronically signed. May 25 2025 11:58AM (05-25-25 @ 11:21)      Bedside Ultrasound:    Tubes/Lines:      GLOBAL ISSUE/BEST PRACTICE:  Analgesia:  Sedation:  HOB elevation: Y  Stress ulcer prophylaxis:  VTE prophylaxis:  Glycemic control:  Nutrition:    CODE STATUS:        Interval Events: Pt seen and examined. Afebrile overnight. No acute overnight events. She feels well today and requesting to eat more. She admits to slight abdominal pain but her exam is nonfocal. She was seen tolerating breakfast without nausea or vomiting, and still wanting more food.    Review of Systems:  Constitutional: No fever, chills, fatigue  Neuro: No headache, numbness, weakness  Resp: No cough, wheezing, shortness of breath  CVS: No chest pain, palpitations, leg swelling  GI: +++abdominal pain. No nausea, vomiting, diarrhea   : No dysuria, frequency, incontinence  Skin: No itching, burning, rashes, or lesions   Msk: No joint pain or swelling  Psych: No depression, anxiety, mood swings    ICU Vital Signs Last 24 Hrs  T(C): 37.4 (27 May 2025 08:07), Max: 37.9 (26 May 2025 12:30)  T(F): 99.3 (27 May 2025 08:07), Max: 100.2 (26 May 2025 12:30)  HR: 92 (27 May 2025 08:00) (77 - 108)  BP: 154/65 (27 May 2025 07:00) (108/66 - 163/78)  BP(mean): 88 (27 May 2025 07:00) (67 - 129)  ABP: --  ABP(mean): --  RR: 16 (27 May 2025 06:00) (0 - 19)  SpO2: 94% (27 May 2025 08:00) (91% - 100%)    O2 Parameters below as of 26 May 2025 19:30  Patient On (Oxygen Delivery Method): room air              05-26-25 @ 07:01  -  05-27-25 @ 07:00  --------------------------------------------------------  IN: 2730 mL / OUT: 1650 mL / NET: 1080 mL        CAPILLARY BLOOD GLUCOSE      POCT Blood Glucose.: 143 mg/dL (27 May 2025 07:40)      I&O's Summary    26 May 2025 07:01  -  27 May 2025 07:00  --------------------------------------------------------  IN: 2730 mL / OUT: 1650 mL / NET: 1080 mL        Physical Exam:   Gen:   Neuro: Elderly female, NAD  HEENT: NC/AT  Resp: Lungs CTA BL. No accessory m. use. Speaking in full sentences  CVS: +s1, S2. RRR.   Abd: soft, NT, ND. Bowel sounds present  Ext: Trace LE edema  Skin:  WWP    Meds:        guaiFENesin ER Oral    acetaminophen     Tablet .. Oral PRN        pantoprazole  Injectable IV Push          chlorhexidine 2% Cloths Topical                              12.5   14.32 )-----------( 152      ( 27 May 2025 05:40 )             36.8       05-27    149[H]  |  112[H]  |  11  ----------------------------<  152[H]  3.2[L]   |  33[H]  |  0.39[L]    Ca    8.3[L]      27 May 2025 05:40  Phos  1.2     05-27  Mg     1.7     05-27    TPro  5.3[L]  /  Alb  2.6[L]  /  TBili  0.5  /  DBili  x   /  AST  24  /  ALT  17  /  AlkPhos  56  05-27          PT/INR - ( 26 May 2025 09:30 )   PT: 13.1 sec;   INR: 1.11 ratio         PTT - ( 26 May 2025 09:30 )  PTT:21.8 sec  Urinalysis Basic - ( 27 May 2025 05:40 )    Color: x / Appearance: x / SG: x / pH: x  Gluc: 152 mg/dL / Ketone: x  / Bili: x / Urobili: x   Blood: x / Protein: x / Nitrite: x   Leuk Esterase: x / RBC: x / WBC x   Sq Epi: x / Non Sq Epi: x / Bacteria: x      Blood Blood-Peripheral   No growth at 24 hours -- 05-25 @ 10:20  Blood Blood-Peripheral   No growth at 24 hours -- 05-25 @ 10:10          Culture Results:   No growth at 24 hours (05-25-25 @ 10:20)  Culture Results:   No growth at 24 hours (05-25-25 @ 10:10)          Radiology:    Xray Chest 1 View- PORTABLE-Urgent:   ACC: [id number] EXAM:  XR CHEST PORTABLE URGENT 1V   ORDERED BY: DANNY GONZALEZ     PROCEDURE DATE:  05/25/2025          INTERPRETATION:  Clinical History: Sepsis    Frontal examination of the chest demonstrates the heart to be within   normal limits in transverse diameter. Hiatal hernia. No acute   infiltrates. Dextroscoliosis thoracic spine.    IMPRESSION: No acute infiltrates.    --- End of Report ---            JOSEPH GALLO MD; Attending Radiologist  This document has been electronically signed. May 25 2025 11:58AM (05-25-25 @ 11:21)      Bedside Ultrasound:    Tubes/Lines:      GLOBAL ISSUE/BEST PRACTICE:  Analgesia:  Sedation:  HOB elevation: Y  Stress ulcer prophylaxis:  VTE prophylaxis:  Glycemic control:  Nutrition:    CODE STATUS:        Interval Events: Pt seen and examined. Afebrile overnight. No acute overnight events. She feels well today and requesting to eat more. She admits to slight abdominal pain but her exam is nonfocal. She was seen tolerating breakfast without nausea or vomiting, and still wanting more food.    Review of Systems:  Constitutional: No fever, chills, fatigue  Neuro: No headache, numbness, weakness  Resp: No cough, wheezing, shortness of breath  CVS: No chest pain, palpitations, leg swelling  GI: +++abdominal pain. No nausea, vomiting, diarrhea   : No dysuria, frequency, incontinence  Skin: No itching, burning, rashes, or lesions   Msk: No joint pain or swelling  Psych: No depression, anxiety, mood swings    ICU Vital Signs Last 24 Hrs  T(C): 37.4 (27 May 2025 08:07), Max: 37.9 (26 May 2025 12:30)  T(F): 99.3 (27 May 2025 08:07), Max: 100.2 (26 May 2025 12:30)  HR: 92 (27 May 2025 08:00) (77 - 108)  BP: 154/65 (27 May 2025 07:00) (108/66 - 163/78)  BP(mean): 88 (27 May 2025 07:00) (67 - 129)  ABP: --  ABP(mean): --  RR: 16 (27 May 2025 06:00) (0 - 19)  SpO2: 94% (27 May 2025 08:00) (91% - 100%)    O2 Parameters below as of 26 May 2025 19:30  Patient On (Oxygen Delivery Method): room air              05-26-25 @ 07:01  -  05-27-25 @ 07:00  --------------------------------------------------------  IN: 2730 mL / OUT: 1650 mL / NET: 1080 mL        CAPILLARY BLOOD GLUCOSE      POCT Blood Glucose.: 143 mg/dL (27 May 2025 07:40)      I&O's Summary    26 May 2025 07:01  -  27 May 2025 07:00  --------------------------------------------------------  IN: 2730 mL / OUT: 1650 mL / NET: 1080 mL        Physical Exam:   Gen:   Neuro: Elderly female, NAD  HEENT: NC/AT  Resp: Lungs CTA BL. No accessory m. use. Speaking in full sentences  CVS: +s1, S2. RRR.   Abd: soft, NT, ND. Bowel sounds present  Ext: Trace LE edema  Skin:  WWP    Meds:        guaiFENesin ER Oral    acetaminophen     Tablet .. Oral PRN        pantoprazole  Injectable IV Push          chlorhexidine 2% Cloths Topical                              12.5   14.32 )-----------( 152      ( 27 May 2025 05:40 )             36.8       05-27    149[H]  |  112[H]  |  11  ----------------------------<  152[H]  3.2[L]   |  33[H]  |  0.39[L]    Ca    8.3[L]      27 May 2025 05:40  Phos  1.2     05-27  Mg     1.7     05-27    TPro  5.3[L]  /  Alb  2.6[L]  /  TBili  0.5  /  DBili  x   /  AST  24  /  ALT  17  /  AlkPhos  56  05-27          PT/INR - ( 26 May 2025 09:30 )   PT: 13.1 sec;   INR: 1.11 ratio         PTT - ( 26 May 2025 09:30 )  PTT:21.8 sec  Urinalysis Basic - ( 27 May 2025 05:40 )    Color: x / Appearance: x / SG: x / pH: x  Gluc: 152 mg/dL / Ketone: x  / Bili: x / Urobili: x   Blood: x / Protein: x / Nitrite: x   Leuk Esterase: x / RBC: x / WBC x   Sq Epi: x / Non Sq Epi: x / Bacteria: x      Blood Blood-Peripheral   No growth at 24 hours -- 05-25 @ 10:20  Blood Blood-Peripheral   No growth at 24 hours -- 05-25 @ 10:10          Culture Results:   No growth at 24 hours (05-25-25 @ 10:20)  Culture Results:   No growth at 24 hours (05-25-25 @ 10:10)          Radiology:    Xray Chest 1 View- PORTABLE-Urgent:   ACC: [id number] EXAM:  XR CHEST PORTABLE URGENT 1V   ORDERED BY: DANNY GONZALEZ     PROCEDURE DATE:  05/25/2025          INTERPRETATION:  Clinical History: Sepsis    Frontal examination of the chest demonstrates the heart to be within   normal limits in transverse diameter. Hiatal hernia. No acute   infiltrates. Dextroscoliosis thoracic spine.    IMPRESSION: No acute infiltrates.    --- End of Report ---            JOSEPH GALLO MD; Attending Radiologist  This document has been electronically signed. May 25 2025 11:58AM (05-25-25 @ 11:21)      Bedside Ultrasound:    Tubes/Lines:      GLOBAL ISSUE/BEST PRACTICE:  Analgesia:  Sedation:  HOB elevation: Y  Stress ulcer prophylaxis:  VTE prophylaxis:  Glycemic control:  Nutrition:    CODE STATUS: DNR/DNI

## 2025-05-27 NOTE — CARE COORDINATION ASSESSMENT. - NSCAREPROVIDERS_GEN_ALL_CORE_FT
CARE PROVIDERS:  Accepting Physician: Sanket Stewart  Access Services: Johnie Estrada  Administration: Evy Sarkar  Admitting: Sanket Stewart  Attending: Sanket Stewart  Consultant: Chalino Parra  Consultant: Lex Gallo  Consultant: Wilder Bello  Consultant: Yuliet Meraz  Consultant: Saira Mora  ED ACP: Jagjit Mccallum  ED Attending: Chet Rivas  ED Nurse: Mitzy Mendez  HIM/Billing & Coding: Ksenia Hackett  Nurse: Renita Hurtado  Nurse: Tammy Savage  Nurse: Brenden Burgos  Nurse: Tammy Stauffer  Nurse: Bautista Almeida  Nurse: Blank Edwards  Nurse: Nila Shanks  Ordered: Wilder Bello  Ordered: Doctor, Unknown  Ordered: ADM, User  PCA/Nursing Assistant: Dana Humphreys  Primary Team: Kandy Bergman  Primary Team: Micky Juan  Primary Team: Sandi Garcia  Primary Team: Darnell Garcia  Primary Team: Ivette Bansal  Primary Team: Angie Wilson  Primary Team: Merry Cleveland  Primary Team: Everett Baca  Registered Dietitian: Paty Wu  : Sendy Gastelum  : Elsa Mcdonald  Team: [facility], Team  Team: [facility], Team  [facility]// Supp. Assoc.: Latha Graves

## 2025-05-27 NOTE — PROGRESS NOTE ADULT - ASSESSMENT
89F Breast CA and HTN who was on Home [facility] admitted for Acute Blood Loss Anemia and Hypovolemic Shock     Acute Blood Loss Anemia / Hypovolemic Shock  S/P Transfusion of 2U PRBC + 1U Platelets  BUN elevated due to upper GI Bleed most likely  Required Pressors on admission but has been titrated off and BP has improved  BP elevated but given continued melanotic BM's holding off on any anti-htn medications currently  PPI Infusion and monitor HgB   GI Consultation obtained and family would prefer to take a conservative approach and avoid procedures if possible    Breast CA  History of Bilateral Mastectomy  Was on Home [facility]     HTN  Hold all anti-hypertensives given Acute Blood Loss    Diet  Being advanced as tolerated    DVT Prophylaxis  Pharm agents contraindicated    Disposition   Discharge planning pending hospital course     89F Breast CA and HTN who was on Home [facility] admitted for Acute Blood Loss Anemia and Hypovolemic Shock     Acute Blood Loss Anemia / Hypovolemic Shock  S/P Transfusion of 2U PRBC + 1U Platelets  BUN elevated due to upper GI Bleed most likely  Required Pressors on admission but has been titrated off and BP has improved  BP elevated but given continued melanotic BM's holding off on any anti-htn medications currently  PPI Infusion and monitor HgB   GI Consultation obtained and family would prefer to take a conservative approach and avoid procedures if possible    Electrolyte Abnormalities  Hypernatremia - Free Water Deficit; Increase free water intake if possible or will substitute with D5W  Hypokalemia - Replete  Check Mag    Breast CA  History of Bilateral Mastectomy  Was on Home [facility]     HTN  Hold all anti-hypertensives given Acute Blood Loss    Diet  Being advanced as tolerated    DVT Prophylaxis  Pharm agents contraindicated    Disposition   Discharge planning pending hospital course

## 2025-05-27 NOTE — SOCIAL WORK PROGRESS NOTE - NSSWPROGRESSNOTE_GEN_ALL_CORE
Reached out to pt's son/Oliverio Morales at ([phone]) in order to confirm whether or not the patient is enrolled in hospice. Mr. Morales stated that the pt was enrolled in the past but he is not sure whether or not the pt remains in hospice. Awaiting call from Mr. Morales to confirm and to provide the name of the hospice program that the patient was enrolled in to confirm information. Will continue to follow up.

## 2025-05-28 LAB
ALBUMIN SERPL ELPH-MCNC: 2.4 G/DL — LOW (ref 3.3–5)
ALP SERPL-CCNC: 54 U/L — SIGNIFICANT CHANGE UP (ref 40–120)
ALT FLD-CCNC: 15 U/L — SIGNIFICANT CHANGE UP (ref 12–78)
ANION GAP SERPL CALC-SCNC: 6 MMOL/L — SIGNIFICANT CHANGE UP (ref 5–17)
APTT BLD: 22.9 SEC — LOW (ref 26.1–36.8)
AST SERPL-CCNC: 17 U/L — SIGNIFICANT CHANGE UP (ref 15–37)
BASOPHILS # BLD AUTO: 0.03 K/UL — SIGNIFICANT CHANGE UP (ref 0–0.2)
BASOPHILS NFR BLD AUTO: 0.1 % — SIGNIFICANT CHANGE UP (ref 0–2)
BILIRUB SERPL-MCNC: 0.5 MG/DL — SIGNIFICANT CHANGE UP (ref 0.2–1.2)
BUN SERPL-MCNC: 8 MG/DL — SIGNIFICANT CHANGE UP (ref 7–23)
CALCIUM SERPL-MCNC: 7.8 MG/DL — LOW (ref 8.5–10.1)
CHLORIDE SERPL-SCNC: 103 MMOL/L — SIGNIFICANT CHANGE UP (ref 96–108)
CO2 SERPL-SCNC: 36 MMOL/L — HIGH (ref 22–31)
CREAT SERPL-MCNC: 0.41 MG/DL — LOW (ref 0.5–1.3)
EGFR: 94 ML/MIN/1.73M2 — SIGNIFICANT CHANGE UP
EGFR: 94 ML/MIN/1.73M2 — SIGNIFICANT CHANGE UP
EOSINOPHIL # BLD AUTO: 0.01 K/UL — SIGNIFICANT CHANGE UP (ref 0–0.5)
EOSINOPHIL NFR BLD AUTO: 0 % — SIGNIFICANT CHANGE UP (ref 0–6)
GLUCOSE SERPL-MCNC: 164 MG/DL — HIGH (ref 70–99)
HCT VFR BLD CALC: 30.2 % — LOW (ref 34.5–45)
HCT VFR BLD CALC: 33 % — LOW (ref 34.5–45)
HCT VFR BLD CALC: 34.1 % — LOW (ref 34.5–45)
HGB BLD-MCNC: 10.3 G/DL — LOW (ref 11.5–15.5)
HGB BLD-MCNC: 11.3 G/DL — LOW (ref 11.5–15.5)
HGB BLD-MCNC: 11.7 G/DL — SIGNIFICANT CHANGE UP (ref 11.5–15.5)
IMM GRANULOCYTES NFR BLD AUTO: 1.2 % — HIGH (ref 0–0.9)
INR BLD: 1.02 RATIO — SIGNIFICANT CHANGE UP (ref 0.85–1.16)
LYMPHOCYTES # BLD AUTO: 0.98 K/UL — LOW (ref 1–3.3)
LYMPHOCYTES # BLD AUTO: 4.9 % — LOW (ref 13–44)
MAGNESIUM SERPL-MCNC: 1.7 MG/DL — SIGNIFICANT CHANGE UP (ref 1.6–2.6)
MCHC RBC-ENTMCNC: 30.8 PG — SIGNIFICANT CHANGE UP (ref 27–34)
MCHC RBC-ENTMCNC: 31 PG — SIGNIFICANT CHANGE UP (ref 27–34)
MCHC RBC-ENTMCNC: 34.1 G/DL — SIGNIFICANT CHANGE UP (ref 32–36)
MCHC RBC-ENTMCNC: 34.3 G/DL — SIGNIFICANT CHANGE UP (ref 32–36)
MCV RBC AUTO: 89.7 FL — SIGNIFICANT CHANGE UP (ref 80–100)
MCV RBC AUTO: 91 FL — SIGNIFICANT CHANGE UP (ref 80–100)
MONOCYTES # BLD AUTO: 0.89 K/UL — SIGNIFICANT CHANGE UP (ref 0–0.9)
MONOCYTES NFR BLD AUTO: 4.4 % — SIGNIFICANT CHANGE UP (ref 2–14)
MRSA PCR RESULT.: SIGNIFICANT CHANGE UP
NEUTROPHILS # BLD AUTO: 17.87 K/UL — HIGH (ref 1.8–7.4)
NEUTROPHILS NFR BLD AUTO: 89.4 % — HIGH (ref 43–77)
NRBC BLD AUTO-RTO: 0 /100 WBCS — SIGNIFICANT CHANGE UP (ref 0–0)
NRBC BLD AUTO-RTO: 0 /100 WBCS — SIGNIFICANT CHANGE UP (ref 0–0)
PHOSPHATE SERPL-MCNC: 3.2 MG/DL — SIGNIFICANT CHANGE UP (ref 2.5–4.5)
PLATELET # BLD AUTO: 151 K/UL — SIGNIFICANT CHANGE UP (ref 150–400)
PLATELET # BLD AUTO: 154 K/UL — SIGNIFICANT CHANGE UP (ref 150–400)
POTASSIUM SERPL-MCNC: 3.3 MMOL/L — LOW (ref 3.5–5.3)
POTASSIUM SERPL-SCNC: 3.3 MMOL/L — LOW (ref 3.5–5.3)
PROCALCITONIN SERPL-MCNC: 0.08 NG/ML — SIGNIFICANT CHANGE UP
PROT SERPL-MCNC: 5.1 G/DL — LOW (ref 6–8.3)
PROTHROM AB SERPL-ACNC: 12 SEC — SIGNIFICANT CHANGE UP (ref 9.9–13.4)
RBC # BLD: 3.32 M/UL — LOW (ref 3.8–5.2)
RBC # BLD: 3.8 M/UL — SIGNIFICANT CHANGE UP (ref 3.8–5.2)
RBC # FLD: 17.3 % — HIGH (ref 10.3–14.5)
RBC # FLD: 17.4 % — HIGH (ref 10.3–14.5)
S AUREUS DNA NOSE QL NAA+PROBE: SIGNIFICANT CHANGE UP
SODIUM SERPL-SCNC: 145 MMOL/L — SIGNIFICANT CHANGE UP (ref 135–145)
WBC # BLD: 19.37 K/UL — HIGH (ref 3.8–10.5)
WBC # BLD: 20.02 K/UL — HIGH (ref 3.8–10.5)
WBC # FLD AUTO: 19.37 K/UL — HIGH (ref 3.8–10.5)
WBC # FLD AUTO: 20.02 K/UL — HIGH (ref 3.8–10.5)

## 2025-05-28 PROCEDURE — 71045 X-RAY EXAM CHEST 1 VIEW: CPT | Mod: 26

## 2025-05-28 PROCEDURE — 99232 SBSQ HOSP IP/OBS MODERATE 35: CPT

## 2025-05-28 PROCEDURE — 99291 CRITICAL CARE FIRST HOUR: CPT | Mod: 25

## 2025-05-28 PROCEDURE — 93010 ELECTROCARDIOGRAM REPORT: CPT | Mod: 76

## 2025-05-28 PROCEDURE — 71045 X-RAY EXAM CHEST 1 VIEW: CPT | Mod: 26,77

## 2025-05-28 RX ORDER — PHENYLEPHRINE HCL IN 0.9% NACL 0.5 MG/5ML
0.5 SYRINGE (ML) INTRAVENOUS
Qty: 40 | Refills: 0 | Status: DISCONTINUED | OUTPATIENT
Start: 2025-05-28 | End: 2025-05-30

## 2025-05-28 RX ORDER — AMIODARONE HYDROCHLORIDE 50 MG/ML
150 INJECTION, SOLUTION INTRAVENOUS ONCE
Refills: 0 | Status: DISCONTINUED | OUTPATIENT
Start: 2025-05-28 | End: 2025-05-28

## 2025-05-28 RX ORDER — METOCLOPRAMIDE HCL 10 MG
10 TABLET ORAL ONCE
Refills: 0 | Status: COMPLETED | OUTPATIENT
Start: 2025-05-28 | End: 2025-05-28

## 2025-05-28 RX ORDER — NOREPINEPHRINE BITARTRATE 8 MG
0.03 SOLUTION INTRAVENOUS
Qty: 8 | Refills: 0 | Status: DISCONTINUED | OUTPATIENT
Start: 2025-05-28 | End: 2025-05-28

## 2025-05-28 RX ADMIN — Medication 7.65 MICROGRAM(S)/KG/MIN: at 12:15

## 2025-05-28 RX ADMIN — Medication 100 MILLIEQUIVALENT(S): at 08:20

## 2025-05-28 RX ADMIN — Medication 10 MG/HR: at 08:20

## 2025-05-28 RX ADMIN — Medication 10 MILLIGRAM(S): at 10:15

## 2025-05-28 RX ADMIN — NOREPINEPHRINE BITARTRATE 2.3 MICROGRAM(S)/KG/MIN: 8 SOLUTION at 10:39

## 2025-05-28 RX ADMIN — Medication 10 MG/HR: at 14:52

## 2025-05-28 RX ADMIN — Medication 100 MILLIEQUIVALENT(S): at 12:15

## 2025-05-28 RX ADMIN — Medication 1 APPLICATION(S): at 06:45

## 2025-05-28 RX ADMIN — Medication 100 MILLIEQUIVALENT(S): at 10:02

## 2025-05-28 NOTE — PROGRESS NOTE ADULT - SUBJECTIVE AND OBJECTIVE BOX
Interval Events: Pt seen and examined. Patient had RR this AM for hypotension 2/2 UGIB with tarry emesis visualized. She received 1L LR and 1 u pRBCs. She feels improved now though seen vomiting dark emesis at bedside. Now only complaining of gas discomfort.     Review of Systems:  Constitutional: No fever, chills, fatigue  Neuro: No headache, numbness, weakness  Resp: No cough, wheezing, shortness of breath  CVS: No chest pain, palpitations, leg swelling  GI: +Dyspepsia, +Dark emesis. No abdominal pain or nausea.  : No dysuria, frequency, incontinence  Skin: No itching, burning, rashes, or lesions   Msk: No joint pain or swelling  Psych: No depression, anxiety, mood swings    ICU Vital Signs Last 24 Hrs  T(C): 35.9 (28 May 2025 08:08), Max: 38.1 (27 May 2025 20:10)  T(F): 96.7 (28 May 2025 08:08), Max: 100.6 (27 May 2025 20:10)  HR: 98 (28 May 2025 08:00) (84 - 105)  BP: 117/70 (28 May 2025 08:00) (114/78 - 166/79)  BP(mean): 85 (28 May 2025 08:00) (78 - 125)  ABP: --  ABP(mean): --  RR: 16 (28 May 2025 08:00) (15 - 23)  SpO2: 95% (28 May 2025 08:00) (92% - 98%)    O2 Parameters below as of 28 May 2025 08:00  Patient On (Oxygen Delivery Method): room air              05-27-25 @ 07:01  -  05-28-25 @ 07:00  --------------------------------------------------------  IN: 304 mL / OUT: 1100 mL / NET: -796 mL        CAPILLARY BLOOD GLUCOSE      POCT Blood Glucose.: 177 mg/dL (28 May 2025 04:56)      I&O's Summary    27 May 2025 07:01  -  28 May 2025 07:00  --------------------------------------------------------  IN: 304 mL / OUT: 1100 mL / NET: -796 mL        Physical Exam:   Gen: Elderly female, in acute distress 2/2 visualized dark emesis at bedside   Neuro: AAOX3  HEENT: NC/AT  Resp: Lungs CTA BL. No accessory m. use. Speaking in full sentences  CVS: +S1, S2. RRR.   Abd: soft, NT, ND. Bowel sounds present  Ext: Trace LE edema  Skin:  WWP      Meds:        guaiFENesin ER Oral    acetaminophen     Tablet .. Oral PRN        pantoprazole Infusion IV Continuous  simethicone Chew      potassium chloride  10 mEq/100 mL IVPB IV Intermittent      chlorhexidine 2% Cloths Topical                              11.3   x     )-----------( x        ( 28 May 2025 05:02 )             33.0       05-28    145  |  103  |  8   ----------------------------<  164[H]  3.3[L]   |  36[H]  |  0.41[L]    Ca    7.8[L]      28 May 2025 04:36  Phos  3.2     05-28  Mg     1.7     05-28    TPro  5.1[L]  /  Alb  2.4[L]  /  TBili  0.5  /  DBili  x   /  AST  17  /  ALT  15  /  AlkPhos  54  05-28          PT/INR - ( 28 May 2025 04:36 )   PT: 12.0 sec;   INR: 1.02 ratio         PTT - ( 28 May 2025 04:36 )  PTT:22.9 sec  Urinalysis Basic - ( 28 May 2025 04:36 )    Color: x / Appearance: x / SG: x / pH: x  Gluc: 164 mg/dL / Ketone: x  / Bili: x / Urobili: x   Blood: x / Protein: x / Nitrite: x   Leuk Esterase: x / RBC: x / WBC x   Sq Epi: x / Non Sq Epi: x / Bacteria: x      Blood Blood-Peripheral   No growth at 48 Hours -- 05-25 @ 10:20  Blood Blood-Peripheral   No growth at 48 Hours -- 05-25 @ 10:10          Culture Results:   No growth at 48 Hours (05-25-25 @ 10:20)  Culture Results:   No growth at 48 Hours (05-25-25 @ 10:10)          Radiology:        Bedside Ultrasound:    Tubes/Lines: n/a    GLOBAL ISSUE/BEST PRACTICE:  Analgesia:  Sedation:  HOB elevation: Y  Stress ulcer prophylaxis:  VTE prophylaxis:  Glycemic control:  Nutrition:    CODE STATUS: DNR/DNI       Interval Events: Pt seen and examined. Patient had RR this AM for hypotension 2/2 UGIB with tarry emesis visualized. She received 1L LR and 1 u pRBCs. She feels improved now though seen vomiting dark emesis at bedside. Now only complaining of gas discomfort.     Discussed potential for EGD with son Oliverio. He is leaning towards EGD and is understanding of the risks associated with generalized anesthesia in advanced age. He will come in to discuss further with patient.     Review of Systems:  Constitutional: No fever, chills, fatigue  Neuro: No headache, numbness, weakness  Resp: No cough, wheezing, shortness of breath  CVS: No chest pain, palpitations, leg swelling  GI: +Dyspepsia, +Dark emesis. No abdominal pain or nausea.  : No dysuria, frequency, incontinence  Skin: No itching, burning, rashes, or lesions   Msk: No joint pain or swelling  Psych: No depression, anxiety, mood swings    ICU Vital Signs Last 24 Hrs  T(C): 35.9 (28 May 2025 08:08), Max: 38.1 (27 May 2025 20:10)  T(F): 96.7 (28 May 2025 08:08), Max: 100.6 (27 May 2025 20:10)  HR: 98 (28 May 2025 08:00) (84 - 105)  BP: 117/70 (28 May 2025 08:00) (114/78 - 166/79)  BP(mean): 85 (28 May 2025 08:00) (78 - 125)  ABP: --  ABP(mean): --  RR: 16 (28 May 2025 08:00) (15 - 23)  SpO2: 95% (28 May 2025 08:00) (92% - 98%)    O2 Parameters below as of 28 May 2025 08:00  Patient On (Oxygen Delivery Method): room air              05-27-25 @ 07:01  -  05-28-25 @ 07:00  --------------------------------------------------------  IN: 304 mL / OUT: 1100 mL / NET: -796 mL        CAPILLARY BLOOD GLUCOSE      POCT Blood Glucose.: 177 mg/dL (28 May 2025 04:56)      I&O's Summary    27 May 2025 07:01  -  28 May 2025 07:00  --------------------------------------------------------  IN: 304 mL / OUT: 1100 mL / NET: -796 mL        Physical Exam:   Gen: Elderly female, in acute distress 2/2 visualized dark emesis at bedside   Neuro: AAOX3  HEENT: NC/AT  Resp: Lungs CTA BL. No accessory m. use. Speaking in full sentences  CVS: +S1, S2. RRR.   Abd: soft, NT, ND. Bowel sounds present  Ext: Trace LE edema  Skin:  WWP      Meds:        guaiFENesin ER Oral    acetaminophen     Tablet .. Oral PRN        pantoprazole Infusion IV Continuous  simethicone Chew      potassium chloride  10 mEq/100 mL IVPB IV Intermittent      chlorhexidine 2% Cloths Topical                              11.3   x     )-----------( x        ( 28 May 2025 05:02 )             33.0       05-28    145  |  103  |  8   ----------------------------<  164[H]  3.3[L]   |  36[H]  |  0.41[L]    Ca    7.8[L]      28 May 2025 04:36  Phos  3.2     05-28  Mg     1.7     05-28    TPro  5.1[L]  /  Alb  2.4[L]  /  TBili  0.5  /  DBili  x   /  AST  17  /  ALT  15  /  AlkPhos  54  05-28          PT/INR - ( 28 May 2025 04:36 )   PT: 12.0 sec;   INR: 1.02 ratio         PTT - ( 28 May 2025 04:36 )  PTT:22.9 sec  Urinalysis Basic - ( 28 May 2025 04:36 )    Color: x / Appearance: x / SG: x / pH: x  Gluc: 164 mg/dL / Ketone: x  / Bili: x / Urobili: x   Blood: x / Protein: x / Nitrite: x   Leuk Esterase: x / RBC: x / WBC x   Sq Epi: x / Non Sq Epi: x / Bacteria: x      Blood Blood-Peripheral   No growth at 48 Hours -- 05-25 @ 10:20  Blood Blood-Peripheral   No growth at 48 Hours -- 05-25 @ 10:10          Culture Results:   No growth at 48 Hours (05-25-25 @ 10:20)  Culture Results:   No growth at 48 Hours (05-25-25 @ 10:10)          Radiology:        Bedside Ultrasound:    Tubes/Lines: n/a    GLOBAL ISSUE/BEST PRACTICE:  Analgesia:  Sedation:  HOB elevation: Y  Stress ulcer prophylaxis:  VTE prophylaxis:  Glycemic control:  Nutrition:    CODE STATUS: DNR/DNI

## 2025-05-28 NOTE — PROGRESS NOTE ADULT - ATTENDING COMMENTS
Mrs. Morales is an 89-year-old female with history of breast cancer with bilateral mastectomy presents today due to vomiting and diarrhea found to have GIB requiring admission to ICU. Downgraded yesterday. Overnight patient had ricky hematemesis and brought over to the ICU.  Neuro: AOx3. Complaining of abdominal pain.  CV: BP labile. Has been having tachycardia on levophed so will switch to phenylephrine.   Respiratory: CHASE  GI/Heme: UGIB. From CT 5/25/25: Foci of active GI bleeding in the lower esophagus/gastroesophageal junction, with intraluminal pooling of contrast on the delayed phase. 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 Pt is s/p 4 units pRBCs and 1 unit platelets on admission. Overnight received 1 unit pRBCs. On protonix BID.  If undergoes EGD, would need to be intubated. Son to discuss this with patient. NPO. Holding DVT ppx. SCDs. Maintain active type and screen and 2 large bore IVs. Q6hrs CBCs.  Renal: Hypernatremia improving. .  Endocrine: No known history of diabetes. Hemoglobin a1c WNL. FS q6hrs while NPO  Ethics: DNR/DNI. Pt was on hospice, but, as per son, it was mostly for additional support. She had a significant decline after previous admission as [facility] for a fall. D/w social work. Mrs. Morales is an 89-year-old female with history of breast cancer with bilateral mastectomy presents today due to vomiting and diarrhea found to have GIB requiring admission to ICU. Downgraded yesterday. Overnight patient had ricky hematemesis and brought over to the ICU.  Neuro: AOx3. Complaining of abdominal pain.  CV: BP labile. Has been having tachycardia on levophed so will switch to phenylephrine.   Respiratory: CHASE  GI/Heme: UGIB. From CT 5/25/25: Foci of active GI bleeding in the lower esophagus/gastroesophageal junction, with intraluminal pooling of contrast on the delayed phase. 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 Pt is s/p 4 units pRBCs and 1 unit platelets on admission. Overnight received 1 unit pRBCs. On protonix BID.  Agrees to be intubated for an EGD. Scheduled for tomorrow. Placed NGT to suction with >1L coffee ground fluid coming out. NPO. Holding DVT ppx. SCDs. Maintain active type and screen and 2 large bore IVs. Q6hrs CBCs.  Renal: Hypernatremia improving. .  Endocrine: No known history of diabetes. Hemoglobin a1c WNL. FS q6hrs while NPO  Ethics: DNR/DNI. Pt was on hospice, but, as per son, it was mostly for additional support. She had a significant decline after previous admission as [facility] for a fall. D/w social work. Mrs. Morales is an 89-year-old female with history of breast cancer with bilateral mastectomy presents today due to vomiting and diarrhea found to have GIB requiring admission to ICU. Downgraded yesterday. Overnight patient had ricky hematemesis and brought over to the ICU.  Neuro: AOx3. Complaining of abdominal pain.  CV: BP labile. Has been having tachycardia on levophed so will switch to phenylephrine.   Respiratory: CHASE  GI/Heme: UGIB. From CT 5/25/25: Foci of active GI bleeding in the lower esophagus/gastroesophageal junction, with intraluminal pooling of contrast on the delayed phase. 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 Pt is s/p 4 units pRBCs and 1 unit platelets on admission. Overnight received 1 unit pRBCs. On protonix BID.  Agrees to be intubated for an EGD. Scheduled for tomorrow. Placed NGT to suction with >1L coffee ground fluid coming out. NPO. Holding DVT ppx. SCDs. Maintain active type and screen and 2 large bore IVs. Q6hrs CBCs.  Renal: Hypernatremia improving. .  ID: Leukocytosis. Fever earlier. Suspect that it is probably from the active bleed as procalcitonin is very low, but pt at risk for aspiration pneumonia. Will monitor off abx for now. Cultures sent.  Endocrine: No known history of diabetes. Hemoglobin a1c WNL. FS q6hrs while NPO  Ethics: DNR/DNI. Pt was on hospice, but, as per son, it was mostly for additional support. She had a significant decline after previous admission as [facility] for a fall. D/w social work.

## 2025-05-28 NOTE — PROGRESS NOTE ADULT - SUBJECTIVE AND OBJECTIVE BOX
Subjective: RRT called early this AM due to Hematemesis.  Patient back in ICU and more transfusion was ordered.     MEDICATIONS  (STANDING):  chlorhexidine 2% Cloths 1 Application(s) Topical <User Schedule>  pantoprazole Infusion 8 mG/Hr (10 mL/Hr) IV Continuous <Continuous>  phenylephrine    Infusion 0.5 MICROgram(s)/kG/Min (7.65 mL/Hr) IV Continuous <Continuous>    MEDICATIONS  (PRN):  acetaminophen     Tablet .. 650 milliGRAM(s) Oral every 6 hours PRN Mild Pain (1 - 3)      Vital Signs Last 24 Hrs  T(C): 37.6 (28 May 2025 16:56), Max: 37.7 (27 May 2025 21:57)  T(F): 99.6 (28 May 2025 16:56), Max: 99.9 (27 May 2025 21:57)  HR: 108 (28 May 2025 20:15) (81 - 142)  BP: 87/59 (28 May 2025 20:15) (56/21 - 137/84)  BP(mean): 70 (28 May 2025 20:15) (31 - 106)  RR: 17 (28 May 2025 20:15) (12 - 29)  SpO2: 100% (28 May 2025 20:15) (82% - 100%)    Parameters below as of 28 May 2025 08:00  Patient On (Oxygen Delivery Method): room air        PHYSICAL EXAM:  GENERAL: NAD  HEAD:  Atraumatic, Normocephalic  ENMT: Moist mucous membranes  NECK: Supple, No JVD, Normal thyroid  CHEST/LUNG: Clear to auscultation bilaterally  HEART: Regular rate and rhythm  ABDOMEN: Soft, Nontender, Nondistended  EXTREMITIES:  2+ Peripheral Pulses      LABS:                        10.3   19.37 )-----------( 154      ( 28 May 2025 18:00 )             30.2     28 May 2025 04:36    145    |  103    |  8      ----------------------------<  164    3.3     |  36     |  0.41     Ca    7.8        28 May 2025 04:36  Phos  3.2       28 May 2025 04:36  Mg     1.7       28 May 2025 04:36    TPro  5.1    /  Alb  2.4    /  TBili  0.5    /  DBili  x      /  AST  17     /  ALT  15     /  AlkPhos  54     28 May 2025 04:36    PT/INR - ( 28 May 2025 04:36 )   PT: 12.0 sec;   INR: 1.02 ratio         PTT - ( 28 May 2025 04:36 )  PTT:22.9 sec  Urinalysis Basic - ( 28 May 2025 04:36 )    Color: x / Appearance: x / SG: x / pH: x  Gluc: 164 mg/dL / Ketone: x  / Bili: x / Urobili: x   Blood: x / Protein: x / Nitrite: x   Leuk Esterase: x / RBC: x / WBC x   Sq Epi: x / Non Sq Epi: x / Bacteria: x      CAPILLARY BLOOD GLUCOSE      POCT Blood Glucose.: 193 mg/dL (28 May 2025 12:04)  POCT Blood Glucose.: 177 mg/dL (28 May 2025 04:56)

## 2025-05-28 NOTE — PROGRESS NOTE ADULT - ASSESSMENT
89F Breast CA and HTN who was on Home Hospice admitted for Acute Blood Loss Anemia and Hypovolemic Shock     Acute Blood Loss Anemia / Hypovolemic Shock  S/P Transfusion of 4U PRBC + 1U Platelets; One more Unit transfused today (5/28/25)  Required Pressors on admission but has been titrated off and BP has improved  BP elevated but given continued melanotic BM's holding off on any anti-htn medications currently  PPI Infusion and monitor HgB   Initially family did not want to pursue EGD given the requirement for General Anesthesia but with the recurrent episodes now considering  GI was reconsulted and plan of care to be updated accordingly to decision made    Electrolyte Abnormalities  Hypernatremia - Free Water Deficit; Increase free water intake if possible or will substitute with D5W  Hypokalemia - Replete  Check Mag    Breast CA  History of Bilateral Mastectomy  Was on Home Hospice     HTN  Hold all anti-hypertensives given Acute Blood Loss    Diet  Being advanced as tolerated    DVT Prophylaxis  Pharm agents contraindicated    Disposition   Discharge planning pending hospital course

## 2025-05-28 NOTE — CHART NOTE - NSCHARTNOTEFT_GEN_A_CORE
Resident Rapid Response Note    Rapid response was called at _____ for hematemesis    Events leading up to Rapid Response: Pt is admitted for UGIB, was recently downgraded from the ICU. Pt is s/p 4 pRBCs and 1 unit platelets during hospital stay.     Patient was seen and examined at the bedside by the rapid response team. Dr. White at bedside.    Rapid Response Vital Signs:  BP:  HR:  RR:  SpO2: % on   Temp:  FS:        Physical Exam:  Gen: well appearing, NAD  HEENT: NCAT, PEERLA b/l, EOMI b/l  Cardio: regular rate and rhythm, +s1s2, no murmurs, rubs, or gallops  Pulm: CTA b/l, no wheezes, rales or rhonchi  Abdomen: soft, nontender, nondistended, +BS x4 quadrants, no guarding  Extremities: no cyanosis or edema, +2 pedal pulses  Neuro: AAOx3, CNII-XII intact, 5/5 strength all extremities, sensation intact  Skin: warm and dry          Assessment/Plan:  89-year-old female with past medical history of breast cancer with bilateral mastectomy admitted with acute blood loss anemia d/t UGIB. Rapid response called for hematemesis    -1 unit pRBC ordered  -STAT H/H and T&S          -RN to call if any changes.  -Discussed with Dr. White agrees with above plan  -Family updated by ____ Resident Rapid Response Note    Rapid response was called at 04:52 for hematemesis.     Events leading up to Rapid Response: Pt is admitted for UGIB, was recently downgraded from the ICU. Pt is s/p 4 pRBCs and 1 unit platelets during hospital stay. Patient was noted to have active hematemesis with drop in blood pressures. Patient reports gas like sensation with no specific abdominal pain.     Patient was seen and examined at the bedside by the rapid response team. Dr. White at bedside.    Rapid Response Vital Signs:  BP:105/54> 80/40> 87/49  HR: 84  RR:x  SpO2: 95% on RA  Temp: 98.5F   FS: 177        Physical Exam:  Gen: Ill appearing, no distress  HEAD:  Atraumatic, Normocephalic  ENMT: Moist mucous membranes  NECK: Supple, No JVD, Normal thyroid  CHEST/LUNG: Clear to auscultation bilaterally, no accessory muscle use   HEART: Regular rate and rhythm, no murmurs   ABDOMEN: Soft, Nontender, + R. sided distension   EXTREMITIES:  2+ Peripheral Pulse, no edema         Assessment/Plan:  89-year-old female with past medical history of breast cancer with bilateral mastectomy admitted with acute blood loss anemia d/t UGIB. Rapid response called for hematemesis  - STAT bolus and 1 unit PRBC ordered  - STAT H/H and T & S ordered  - START protonix gtt   - BP remained soft hence patient to be transferred to ICU for further management   - Of note, as per chart review patient has not gotten endoscopy 2/2 limited intervention requested by family as patient would require general anesthesia; recommend follow up on further goals of care  - Unable to contact family - called 2x with no answer by Dr. Buitrago   -RN to call if any changes.  -Discussed with Dr. White agrees with above plan Normal volume, rate, productivity, spontaneity and articulation

## 2025-05-28 NOTE — CHART NOTE - NSCHARTNOTEFT_GEN_A_CORE
RRT called to pt bedside for episode of hematemesis.   Pt seen laying in bed, w/ black tarry emesis surrounding mouth.   Pt initially admitted for UGIB to ICU and received 4 units PRBC and 1 unit PLT during hospital admission.   Pt seen and examined at bedside, alert interactive, follows commands.  VS: BP 80/40 and 87/49, 95% on RA, HR 84  Decision to upgrade pt to ICU for further eval and monitoring.     Neuro: AAOx3, following commands no neuro-focal deficits with good and even strength to all ext  HEENT: PERRL  Resp: Good air entry b/l  CVS: +RRR  Abd: BSx4, soft, nt/nd  Ext: no edema  Skin: warn/dry     Pt is an 89 y/o F pmhx of breast CA w/ b/l mastectomy admitted w/ ABLA in setting of UGIB.   1. Hypotension   2. UGIB   3. ABLA     Plan:   - Will admit to ICU for further eval and monitoring   - Stat labs pending.   - 2  units PRBC ordered in setting of acute UGIB and hypotension.  - 1L LR bolus for hypotension, will consider pressors if BP remains low following fluids and blood.   - Protonix gtt in setting of UGIB.   - Reconsult GI for hematemesis  - Pt high risk for decompensation if continued episodes of UGIB.     44 minutes assessing presenting problems of acute illness, which pose high probability of life threatening deterioration or end organ damage/dysfunction, as well as medical decision making including initiating plan of care, reviewing data, reviewing radiologic exams, discussing with multidisciplinary team,  discussing goals of care with patient/family, and writing this note.  Non-inclusive of procedures performed.    DATE OF ENTRY OF THIS NOTE IS EQUAL TO THE DATE OF SERVICES RENDERED

## 2025-05-28 NOTE — PROGRESS NOTE ADULT - ASSESSMENT
Mrs. Morales is an 89-year-old female with history of breast cancer with bilateral mastectomy presents today due to vomiting and diarrhea found to have GIB requiring admission to ICU.     Neuro:  - AOx3.  -Takes tylenol @ home PRN for pain but is currently comfortable.     CV:   -Hypotensive this AM during RR. Received 1L LR & 1u PRBCs. Not requiring pressors as BP responding well to volume resuscitation   - Monitor routine hemodynamics.   -EKG nonischemic. No Cp or SOB.  -Volume - euvolemic.   Rhythm No issues    Respiratory:   -Saturating well on RA.    GI/Heme:   UGIB. From CT 5/25/25: Foci of active GI bleeding in the lower esophagus/gastroesophageal junction, with intraluminal pooling of contrast on the delayed phase. 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 Gastric distention with complex fluid and heterogeneous debris may represent blood products.  - Pt is s/p 4 units pRBCs and 1 unit platelets. Receiving another unit of blood this AM.  - RESTART Protonix gtt given cont'd UGIB this AM.  -NPO  - Daily CBCs unless has further bleeding.   -Holding DVT ppx. SCDs.   -Maintain active type and screen and 2 large bore IVs.   -GI consulted, recs apprciated    Renal:   -Hypernatremica resolved.   -s/p D5+L5. Though patient has began to bleed again, will likely need more IVF.  -Daily CMPs      Endocrine:   -No known history of diabetes.  - A1C 5.6    Ethics: DNR/DNI. Pt was on hospice, but, as per son, it was mostly for additional support. She had a significant decline after previous admission as [facility] for a fall.     Mrs. Morales is an 89-year-old female with history of breast cancer with bilateral mastectomy presents today due to vomiting and diarrhea found to have GIB requiring admission to ICU.     Neuro:  - AOx3.  -Takes tylenol @ home PRN for pain but is currently comfortable.     CV:   -Hypotensive this AM during RR. Received 1L LR & 1u PRBCs. Not requiring pressors as BP responding well to volume resuscitation   - Monitor routine hemodynamics.   -EKG nonischemic. No Cp or SOB.  -Volume - euvolemic.   Rhythm No issues    Respiratory:   -Saturating well on RA.    GI/Heme:   UGIB. From CT 5/25/25: Foci of active GI bleeding in the lower esophagus/gastroesophageal junction, with intraluminal pooling of contrast on the delayed phase. 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 Gastric distention with complex fluid and heterogeneous debris may represent blood products.  - Pt is s/p 4 units pRBCs and 1 unit platelets. Receiving another unit of blood this AM.  - RESTART Protonix gtt given cont'd UGIB this AM.  -NPO  - Daily CBCs unless has further bleeding.   -Holding DVT ppx. SCDs.   -Maintain active type and screen and 2 large bore IVs.   -family to discuss whether or not they will undergo EGD. Patient will require general anesthesia. Oliverio understands risks and will discuss with his mother.   -GI consulted, recs appreciated    Renal:   -Hypernatremica resolved.   -s/p D5+L5. receiving 1 u pRBC this AM.   -Daily CMPs      Endocrine:   -No known history of diabetes.  - A1C 5.6    ID: Febrile overnight Tmax 100.6  -Will obtain BCx, UCx and Cxray to r/o source of infection  -F/u procal  -Monitor off procal     Ethics: DNR/DNI. Pt was on hospice, but, as per son, it was mostly for additional support. She had a significant decline after previous admission as [facility] for a fall.     Mrs. Morales is an 89-year-old female with history of breast cancer with bilateral mastectomy presents today due to vomiting and diarrhea found to have GIB requiring admission to ICU.     Neuro:  - AOx3.  -Takes tylenol @ home PRN for pain but is currently comfortable.     CV:   -Hypotensive this AM during RR. Received 1L LR & 1u PRBCs. Not requiring pressors as BP responding well to volume resuscitation   - Monitor routine hemodynamics.   -EKG nonischemic. No Cp or SOB.  -Volume - euvolemic.   Rhythm No issues    Respiratory:   -Saturating well on RA.    GI/Heme:   UGIB. From CT 5/25/25: Foci of active GI bleeding in the lower esophagus/gastroesophageal junction, with intraluminal pooling of contrast on the delayed phase. 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 Gastric distention with complex fluid and heterogeneous debris may represent blood products.  - Pt is s/p 4 units pRBCs and 1 unit platelets. Receiving another unit of blood this AM.  - RESTART Protonix gtt given cont'd UGIB this AM.  -NPO  - Daily CBCs unless has further bleeding.   -Holding DVT ppx. SCDs.   -Maintain active type and screen and 2 large bore IVs.   -family to discuss whether or not they will undergo EGD. Patient will require general anesthesia. Oliverio understands risks and will discuss with his mother.   -GI consulted, recs appreciated    Renal:   -Hypernatremica resolved.   -s/p D5+L5. receiving 1 u pRBC this AM.   -Daily CMPs      Endocrine:   -No known history of diabetes.  - A1C 5.6    ID: Febrile overnight Tmax 100.6 with elevated WBC. May be reactive 2/2 concurrent UGIB but will r/o infection.  -Will obtain BCx, UCx and Cxray, Procal  -Monitor off abx pending the above    Ethics: DNR/DNI. Pt was on hospice, but, as per son, it was mostly for additional support. She had a significant decline after previous admission as [facility] for a fall.

## 2025-05-28 NOTE — PROGRESS NOTE ADULT - SUBJECTIVE AND OBJECTIVE BOX
[facility]  Octavio Bone NP  [address] [place] [address]  [phone]      INTERVAL HPI/OVERNIGHT EVENTS:  Pt s/e in ICU  Overnight events noted. Pt had black vomiting this am.  Hgb noted, stable    MEDICATIONS  (STANDING):  chlorhexidine 2% Cloths 1 Application(s) Topical <User Schedule>  guaiFENesin  milliGRAM(s) Oral every 12 hours  metoclopramide Injectable 10 milliGRAM(s) IV Push once  norepinephrine Infusion 0.03 MICROgram(s)/kG/Min (2.3 mL/Hr) IV Continuous <Continuous>  pantoprazole Infusion 8 mG/Hr (10 mL/Hr) IV Continuous <Continuous>  potassium chloride  10 mEq/100 mL IVPB 10 milliEquivalent(s) IV Intermittent every 1 hour  simethicone 80 milliGRAM(s) Chew two times a day    MEDICATIONS  (PRN):  acetaminophen     Tablet .. 650 milliGRAM(s) Oral every 6 hours PRN Mild Pain (1 - 3)      Allergies  No Known Allergies      PHYSICAL EXAM:   Vital Signs:  Vital Signs Last 24 Hrs  T(C): 35.9 (28 May 2025 08:08), Max: 38.1 (27 May 2025 20:10)  T(F): 96.7 (28 May 2025 08:08), Max: 100.6 (27 May 2025 20:10)  HR: 97 (28 May 2025 11:01) (81 - 111)  BP: 91/67 (28 May 2025 11:01) (56/21 - 166/79)  BP(mean): 75 (28 May 2025 11:01) (31 - 104)  RR: 17 (28 May 2025 11:01) (15 - 23)  SpO2: 97% (28 May 2025 11:01) (90% - 100%)    Parameters below as of 28 May 2025 08:00  Patient On (Oxygen Delivery Method): room air      Daily     Daily Weight in k.4 (28 May 2025 05:18)    GENERAL:  Appears stated age  HEENT:  NC/AT  CHEST:  Full & symmetric excursion  HEART:  Regular rhythm  ABDOMEN:  Soft, non-tender, non-distended  EXTEREMITIES:  no cyanosis  SKIN:  No rash      LABS:                        11.3   x     )-----------( x        ( 28 May 2025 05:02 )             33.0         145  |  103  |  8   ----------------------------<  164[H]  3.3[L]   |  36[H]  |  0.41[L]    Ca    7.8[L]      28 May 2025 04:36  Phos  3.2       Mg     1.7         TPro  5.1[L]  /  Alb  2.4[L]  /  TBili  0.5  /  DBili  x   /  AST  17  /  ALT  15  /  AlkPhos  54  -28    PT/INR - ( 28 May 2025 04:36 )   PT: 12.0 sec;   INR: 1.02 ratio         PTT - ( 28 May 2025 04:36 )  PTT:22.9 sec  Urinalysis Basic - ( 28 May 2025 04:36 )    Color: x / Appearance: x / SG: x / pH: x  Gluc: 164 mg/dL / Ketone: x  / Bili: x / Urobili: x   Blood: x / Protein: x / Nitrite: x   Leuk Esterase: x / RBC: x / WBC x   Sq Epi: x / Non Sq Epi: x / Bacteria: x   [facility]  Octavio Bone NP  [address] [place] [address]  [phone]    INTERVAL HPI/OVERNIGHT EVENTS:  Pt s/e in ICU  Overnight events noted. Pt had black vomiting this am.  Hgb noted, stable    MEDICATIONS  (STANDING):  chlorhexidine 2% Cloths 1 Application(s) Topical <User Schedule>  guaiFENesin  milliGRAM(s) Oral every 12 hours  metoclopramide Injectable 10 milliGRAM(s) IV Push once  norepinephrine Infusion 0.03 MICROgram(s)/kG/Min (2.3 mL/Hr) IV Continuous <Continuous>  pantoprazole Infusion 8 mG/Hr (10 mL/Hr) IV Continuous <Continuous>  potassium chloride  10 mEq/100 mL IVPB 10 milliEquivalent(s) IV Intermittent every 1 hour  simethicone 80 milliGRAM(s) Chew two times a day    MEDICATIONS  (PRN):  acetaminophen     Tablet .. 650 milliGRAM(s) Oral every 6 hours PRN Mild Pain (1 - 3)      Allergies  No Known Allergies      PHYSICAL EXAM:   Vital Signs:  Vital Signs Last 24 Hrs  T(C): 35.9 (28 May 2025 08:08), Max: 38.1 (27 May 2025 20:10)  T(F): 96.7 (28 May 2025 08:08), Max: 100.6 (27 May 2025 20:10)  HR: 97 (28 May 2025 11:01) (81 - 111)  BP: 91/67 (28 May 2025 11:01) (56/21 - 166/79)  BP(mean): 75 (28 May 2025 11:01) (31 - 104)  RR: 17 (28 May 2025 11:01) (15 - 23)  SpO2: 97% (28 May 2025 11:01) (90% - 100%)    Parameters below as of 28 May 2025 08:00  Patient On (Oxygen Delivery Method): room air      Daily     Daily Weight in k.4 (28 May 2025 05:18)    GENERAL:  Appears stated age  HEENT:  NC/AT  CHEST:  Full & symmetric excursion  HEART:  Regular rhythm  ABDOMEN:  Soft, non-tender, non-distended  EXTEREMITIES:  no cyanosis  SKIN:  No rash      LABS:                        11.3   x     )-----------( x        ( 28 May 2025 05:02 )             33.0         145  |  103  |  8   ----------------------------<  164[H]  3.3[L]   |  36[H]  |  0.41[L]    Ca    7.8[L]      28 May 2025 04:36  Phos  3.2       Mg     1.7         TPro  5.1[L]  /  Alb  2.4[L]  /  TBili  0.5  /  DBili  x   /  AST  17  /  ALT  15  /  AlkPhos  54  -28    PT/INR - ( 28 May 2025 04:36 )   PT: 12.0 sec;   INR: 1.02 ratio         PTT - ( 28 May 2025 04:36 )  PTT:22.9 sec  Urinalysis Basic - ( 28 May 2025 04:36 )    Color: x / Appearance: x / SG: x / pH: x  Gluc: 164 mg/dL / Ketone: x  / Bili: x / Urobili: x   Blood: x / Protein: x / Nitrite: x   Leuk Esterase: x / RBC: x / WBC x   Sq Epi: x / Non Sq Epi: x / Bacteria: x

## 2025-05-28 NOTE — PROGRESS NOTE ADULT - ASSESSMENT
GI bleed  acute blood loss anemia    5/28 black vomiting    Plan:  - s/p 4u pRBC transfusion, hgb now stable  - IV PPI  - hold a/c and asa  - case d/w son Oliverio at [phone] on 5/25  - she has a large hiatal hernia with distended fluid filled stomach; if EGD were to be required would require general anesthesia  - 5/28 pt had black emesis; again would need intubation/general anesthesia for egd. Hgb is stable.  - to discuss GOC with family  - d/w ICU team

## 2025-05-28 NOTE — CASE MANAGEMENT PROGRESS NOTE - NSCMPROGRESSNOTE_GEN_ALL_CORE
Patient discussed during rounds and remains acute in ICU on room air. Dx: GI bleed. RR-for hematemesis with 2 units of PRBCs received. Patient receiving IV Protonix, IV Levophed, and IVPB Potassium Chloride. Pending-possible EGD. Patient with home hospice, SW following.

## 2025-05-29 LAB
ALBUMIN SERPL ELPH-MCNC: 2.1 G/DL — LOW (ref 3.3–5)
ALP SERPL-CCNC: 50 U/L — SIGNIFICANT CHANGE UP (ref 40–120)
ALT FLD-CCNC: 10 U/L — LOW (ref 12–78)
ANION GAP SERPL CALC-SCNC: 5 MMOL/L — SIGNIFICANT CHANGE UP (ref 5–17)
AST SERPL-CCNC: 15 U/L — SIGNIFICANT CHANGE UP (ref 15–37)
BASOPHILS # BLD AUTO: 0.03 K/UL — SIGNIFICANT CHANGE UP (ref 0–0.2)
BASOPHILS NFR BLD AUTO: 0.1 % — SIGNIFICANT CHANGE UP (ref 0–2)
BILIRUB SERPL-MCNC: 0.4 MG/DL — SIGNIFICANT CHANGE UP (ref 0.2–1.2)
BUN SERPL-MCNC: 22 MG/DL — SIGNIFICANT CHANGE UP (ref 7–23)
CALCIUM SERPL-MCNC: 7.6 MG/DL — LOW (ref 8.5–10.1)
CHLORIDE SERPL-SCNC: 107 MMOL/L — SIGNIFICANT CHANGE UP (ref 96–108)
CO2 SERPL-SCNC: 36 MMOL/L — HIGH (ref 22–31)
CREAT SERPL-MCNC: 0.48 MG/DL — LOW (ref 0.5–1.3)
EGFR: 90 ML/MIN/1.73M2 — SIGNIFICANT CHANGE UP
EGFR: 90 ML/MIN/1.73M2 — SIGNIFICANT CHANGE UP
EOSINOPHIL # BLD AUTO: 0.1 K/UL — SIGNIFICANT CHANGE UP (ref 0–0.5)
EOSINOPHIL NFR BLD AUTO: 0.5 % — SIGNIFICANT CHANGE UP (ref 0–6)
GLUCOSE SERPL-MCNC: 124 MG/DL — HIGH (ref 70–99)
HCT VFR BLD CALC: 28.3 % — LOW (ref 34.5–45)
HGB BLD-MCNC: 9.6 G/DL — LOW (ref 11.5–15.5)
IMM GRANULOCYTES NFR BLD AUTO: 0.8 % — SIGNIFICANT CHANGE UP (ref 0–0.9)
LYMPHOCYTES # BLD AUTO: 11.4 % — LOW (ref 13–44)
LYMPHOCYTES # BLD AUTO: 2.34 K/UL — SIGNIFICANT CHANGE UP (ref 1–3.3)
MAGNESIUM SERPL-MCNC: 1.7 MG/DL — SIGNIFICANT CHANGE UP (ref 1.6–2.6)
MCHC RBC-ENTMCNC: 31.4 PG — SIGNIFICANT CHANGE UP (ref 27–34)
MCHC RBC-ENTMCNC: 33.9 G/DL — SIGNIFICANT CHANGE UP (ref 32–36)
MCV RBC AUTO: 92.5 FL — SIGNIFICANT CHANGE UP (ref 80–100)
MONOCYTES # BLD AUTO: 1.32 K/UL — HIGH (ref 0–0.9)
MONOCYTES NFR BLD AUTO: 6.5 % — SIGNIFICANT CHANGE UP (ref 2–14)
NEUTROPHILS # BLD AUTO: 16.48 K/UL — HIGH (ref 1.8–7.4)
NEUTROPHILS NFR BLD AUTO: 80.7 % — HIGH (ref 43–77)
NRBC BLD AUTO-RTO: 0 /100 WBCS — SIGNIFICANT CHANGE UP (ref 0–0)
PHOSPHATE SERPL-MCNC: 2.7 MG/DL — SIGNIFICANT CHANGE UP (ref 2.5–4.5)
PLATELET # BLD AUTO: 149 K/UL — LOW (ref 150–400)
POTASSIUM SERPL-MCNC: 3.5 MMOL/L — SIGNIFICANT CHANGE UP (ref 3.5–5.3)
POTASSIUM SERPL-SCNC: 3.5 MMOL/L — SIGNIFICANT CHANGE UP (ref 3.5–5.3)
PROT SERPL-MCNC: 4.8 G/DL — LOW (ref 6–8.3)
RBC # BLD: 3.06 M/UL — LOW (ref 3.8–5.2)
RBC # FLD: 17.6 % — HIGH (ref 10.3–14.5)
SODIUM SERPL-SCNC: 148 MMOL/L — HIGH (ref 135–145)
WBC # BLD: 20.44 K/UL — HIGH (ref 3.8–10.5)
WBC # FLD AUTO: 20.44 K/UL — HIGH (ref 3.8–10.5)

## 2025-05-29 PROCEDURE — 99291 CRITICAL CARE FIRST HOUR: CPT

## 2025-05-29 PROCEDURE — 99233 SBSQ HOSP IP/OBS HIGH 50: CPT

## 2025-05-29 RX ORDER — ACETAMINOPHEN 500 MG/5ML
600 LIQUID (ML) ORAL ONCE
Refills: 0 | Status: COMPLETED | OUTPATIENT
Start: 2025-05-29 | End: 2025-05-29

## 2025-05-29 RX ORDER — SODIUM CHLORIDE 9 G/1000ML
1000 INJECTION, SOLUTION INTRAVENOUS
Refills: 0 | Status: DISCONTINUED | OUTPATIENT
Start: 2025-05-29 | End: 2025-06-01

## 2025-05-29 RX ORDER — MAGNESIUM, ALUMINUM HYDROXIDE 200-200 MG
30 TABLET,CHEWABLE ORAL EVERY 4 HOURS
Refills: 0 | Status: DISCONTINUED | OUTPATIENT
Start: 2025-05-29 | End: 2025-06-04

## 2025-05-29 RX ORDER — PHENTOLAMINE MESYLATE
5 POWDER (GRAM) MISCELLANEOUS ONCE
Refills: 0 | Status: COMPLETED | OUTPATIENT
Start: 2025-05-29 | End: 2025-05-29

## 2025-05-29 RX ADMIN — Medication 600 MILLIGRAM(S): at 14:04

## 2025-05-29 RX ADMIN — Medication 5 MILLIGRAM(S): at 11:00

## 2025-05-29 RX ADMIN — Medication 30 MILLILITER(S): at 14:25

## 2025-05-29 RX ADMIN — Medication 10 MG/HR: at 13:30

## 2025-05-29 RX ADMIN — Medication 10 MG/HR: at 05:38

## 2025-05-29 RX ADMIN — Medication 1 APPLICATION(S): at 05:41

## 2025-05-29 RX ADMIN — Medication 240 MILLIGRAM(S): at 13:49

## 2025-05-29 RX ADMIN — SODIUM CHLORIDE 75 MILLILITER(S): 9 INJECTION, SOLUTION INTRAVENOUS at 11:31

## 2025-05-29 NOTE — CHART NOTE - NSCHARTNOTEFT_GEN_A_CORE
Assessment: Pt seen for nutrition follow-up. Chart reviewed, hospital course noted.    Brief hx: Pt is a "89-year-old female with history of breast cancer with bilateral mastectomy presents today due to vomiting and diarrhea found to have GIB requiring admission to ICU. Downgraded yesterday. Overnight patient had ricky hematemesis and brought over to the ICU."    Visited pt at bedside this am. Pt asleep during visit today. Currently NPO at this time. Receiving IVFs; D5+LR@75ml/hr. Pt with multiple episodes of brown/black emesis yesterday. NGT in place to suction; output noted 5/28. Pt scheduled for EGD today. Pt to remain NPO at this time. Recommend initiating clear liquid diet when medically feasible. RD remains available and will continue to follow-up.     Factors impacting intake: [ ] none [ ] nausea  [ ] vomiting [ ] diarrhea [ ] constipation  [ ]chewing problems [ ] swallowing issues  [X] other: GIB, for EGD today, NPO status    Diet Prescription: Diet, NPO (05-28-25 @ 05:18)    Intake: N/A    Current Weight: Weight (kg): 40.8 (05-25 @ 10:00), 5/26 103.1#, 5/29 98.3# (1+ BL arm edema noted)  % Weight Change    Pertinent Medications: MEDICATIONS  (STANDING):  chlorhexidine 2% Cloths 1 Application(s) Topical <User Schedule>  dextrose 5% + lactated ringers. 1000 milliLiter(s) (75 mL/Hr) IV Continuous <Continuous>  pantoprazole Infusion 8 mG/Hr (10 mL/Hr) IV Continuous <Continuous>  phenylephrine    Infusion 0.5 MICROgram(s)/kG/Min (7.65 mL/Hr) IV Continuous <Continuous>    MEDICATIONS  (PRN):  acetaminophen     Tablet .. 650 milliGRAM(s) Oral every 6 hours PRN Mild Pain (1 - 3)    Pertinent Labs: 05-29 Na148 mmol/L[H] Glu 124 mg/dL[H] K+ 3.5 mmol/L Cr  0.48 mg/dL[L] BUN 22 mg/dL 05-29 Phos 2.7 mg/dL 05-29 Alb 2.1 g/dL[L]    CAPILLARY BLOOD GLUCOSE  POCT Blood Glucose.: 144 mg/dL (29 May 2025 06:00)  POCT Blood Glucose.: 128 mg/dL (29 May 2025 00:43)  POCT Blood Glucose.: 193 mg/dL (28 May 2025 12:04)    Skin: R shin wound, stage I to sacrum, stage I to BL heels    Estimated Needs:   [X] no change since previous assessment: based on #/45.3kg  30-35kcal/kg (1359-1585kcal)  1.2-1.4g pro/kg (54-63gm protein)  [ ] recalculated:     Previous Nutrition Diagnosis:   [ ] Inadequate Energy Intake [ ]Inadequate Oral Intake [ ] Excessive Energy Intake   [ ] Underweight [ ] Increased Nutrient Needs [ ] Overweight/Obesity   [X] Altered GI Function [ ] Unintended Weight Loss [ ] Food & Nutrition Related Knowledge Deficit [X] Malnutrition (moderate, chronic)    Nutrition Diagnosis is [X] ongoing  [ ] resolved [ ] not applicable     New Nutrition Diagnosis: [X] not applicable     Interventions:   Recommend  [ ] Change Diet To:  [ ] Nutrition Supplement  [ ] Nutrition Support  [X] Other: Pt to remain NPO with IVFs at this time; recommend initiating clear liquid diet as medically feasible and advance diet as tolerated  Recommend MVI/minerals daily    Monitoring and Evaluation:   [ ] PO intake [ x ] Tolerance to diet prescription [ x ] weights [ x ] labs[ x ] follow up per protocol  [X] other: s/s GI distress, bowel function, skin integrity/ edema

## 2025-05-29 NOTE — PROVIDER CONTACT NOTE (OTHER) - ASSESSMENT
\upon assessment patient found resting in bed, a&ox4, NAD, denies SOB, chest pain, dizziness. infiltrated L 18 gIV, phenylephrine infusion @ 0.3 mL/hr \upon assessment patient found resting in bed, a&ox4, NAD, denies SOB, chest pain, dizziness. infiltrated L 18 gIV with large bump to lateral L side, tenderness to touch, phenylephrine infusion @ 0.3 mcg mL/hr infusing. \upon assessment patient found resting in bed, a&ox4, NAD, denies SOB, chest pain, dizziness. infiltrated L 18 gIV with large bump to lateral L side, ecchymosis, tenderness to touch, phenylephrine infusion @ 0.3 mcg mL/hr infusing.

## 2025-05-29 NOTE — PROGRESS NOTE ADULT - ASSESSMENT
89F Breast CA and HTN who was on Home Hospice admitted for Acute Blood Loss Anemia and Hypovolemic Shock     Acute Blood Loss Anemia / Hypovolemic Shock  S/P Transfusion of 5U PRBC + 1U Platelets;  Required Pressors on admission, titrated off, but restarted due to persistent hypotension  Given continued melanotic BM's and dark vomitus holding off on any anti-htn medications currently  PPI Infusion and monitor HgB   Initially family did not want to pursue EGD given the requirement for General Anesthesia but with the recurrent episodes now considering  GI was reconsulted and plan of care to be updated accordingly to decision made    Electrolyte Abnormalities  Hypernatremia - Free Water Deficit; Increase free water intake if possible or will substitute with D5W  Hypokalemia - Replete  Check Mag    Breast CA  History of Bilateral Mastectomy  Was on Home Hospice     HTN  Hold all anti-hypertensives given Acute Blood Loss    Diet  NPO for now, NGT in place    DVT Prophylaxis  Pharm agents contraindicated in setting of GI bleed  SCDs    Disposition   Guarded prognosis, plan per ICU

## 2025-05-29 NOTE — PROGRESS NOTE ADULT - SUBJECTIVE AND OBJECTIVE BOX
Interval Events: Pt seen and examined. She feels much improved this AM. She denies any further abdominal pain, nausea or vomiting overnight. Minimal output in the NGT overnight. Afebrile this AM.  Will defer EGD this AM per GI.     Review of Systems:  Constitutional: No fever, chills, fatigue  Neuro: No headache, numbness, weakness  Resp: No cough, wheezing, shortness of breath  CVS: No chest pain, palpitations, leg swelling  GI: No abdominal pain, nausea, vomiting, diarrhea   : No dysuria, frequency, incontinence  Skin: No itching, burning, rashes, or lesions   Msk: No joint pain or swelling  Psych: No depression, anxiety, mood swings    ICU Vital Signs Last 24 Hrs  T(C): 37.2 (29 May 2025 11:59), Max: 37.6 (28 May 2025 16:56)  T(F): 98.9 (29 May 2025 11:59), Max: 99.6 (28 May 2025 16:56)  HR: 92 (29 May 2025 16:00) (84 - 113)  BP: 137/67 (29 May 2025 16:00) (75/54 - 150/68)  BP(mean): 89 (29 May 2025 16:00) (54 - 103)  ABP: --  ABP(mean): --  RR: 20 (29 May 2025 16:00) (13 - 29)  SpO2: 99% (29 May 2025 16:00) (77% - 100%)    O2 Parameters below as of 29 May 2025 07:00  Patient On (Oxygen Delivery Method): room air              05-28-25 @ 07:01  -  05-29-25 @ 07:00  --------------------------------------------------------  IN: 599.5 mL / OUT: 1600 mL / NET: -1000.5 mL    05-29-25 @ 07:01  -  05-29-25 @ 16:19  --------------------------------------------------------  IN: 682.2 mL / OUT: 0 mL / NET: 682.2 mL        CAPILLARY BLOOD GLUCOSE      POCT Blood Glucose.: 147 mg/dL (29 May 2025 12:05)      I&O's Summary    28 May 2025 07:01  -  29 May 2025 07:00  --------------------------------------------------------  IN: 599.5 mL / OUT: 1600 mL / NET: -1000.5 mL    29 May 2025 07:01  -  29 May 2025 16:19  --------------------------------------------------------  IN: 682.2 mL / OUT: 0 mL / NET: 682.2 mL        Physical Exam:   Gen: Elderly female, lying in bed comfortably   Neuro: AAOX3  HEENT: NC/AT  Resp: Lungs CTA BL. No accessory m. use. Speaking in full sentences  CVS: +S1, S2. RRR.   Abd: soft, NT, ND. Bowel sounds present  Ext: Trace LE edema  Skin:  WWP        Meds:    phenylephrine    Infusion IV Continuous              aluminum hydroxide/magnesium hydroxide/simethicone Suspension Oral PRN  pantoprazole Infusion IV Continuous      dextrose 5% + lactated ringers. IV Continuous      chlorhexidine 2% Cloths Topical                              9.6    20.44 )-----------( 149      ( 29 May 2025 05:35 )             28.3       05-29    148[H]  |  107  |  22  ----------------------------<  124[H]  3.5   |  36[H]  |  0.48[L]    Ca    7.6[L]      29 May 2025 05:35  Phos  2.7     05-29  Mg     1.7     05-29    TPro  4.8[L]  /  Alb  2.1[L]  /  TBili  0.4  /  DBili  x   /  AST  15  /  ALT  10[L]  /  AlkPhos  50  05-29          PT/INR - ( 28 May 2025 04:36 )   PT: 12.0 sec;   INR: 1.02 ratio         PTT - ( 28 May 2025 04:36 )  PTT:22.9 sec  Urinalysis Basic - ( 29 May 2025 05:35 )    Color: x / Appearance: x / SG: x / pH: x  Gluc: 124 mg/dL / Ketone: x  / Bili: x / Urobili: x   Blood: x / Protein: x / Nitrite: x   Leuk Esterase: x / RBC: x / WBC x   Sq Epi: x / Non Sq Epi: x / Bacteria: x      Blood Blood-Peripheral   No growth at 24 hours -- 05-28 @ 11:20  Blood Blood-Peripheral   No growth at 24 hours -- 05-28 @ 11:00  Blood Blood-Peripheral   No growth at 4 days -- 05-25 @ 10:20  Blood Blood-Peripheral   No growth at 4 days -- 05-25 @ 10:10          Culture Results:   No growth at 24 hours (05-28-25 @ 11:20)  Culture Results:   No growth at 24 hours (05-28-25 @ 11:00)          Radiology:    Xray Chest 1 View-PORTABLE IMMEDIATE:   ACC: [id number] EXAM:  XR CHEST PORTABLE IMMED 1V   ORDERED BY: MICHELA MARTIN     PROCEDURE DATE:  05/28/2025          INTERPRETATION:  EXAM:XR CHEST IMMEDIATE.     CLINICAL INDICATION: GI bleed .     TECHNIQUE: Portable semiupright AP view.     PRIOR EXAM: 05/25/2025.     FINDINGS:     No significant change. Redemonstration of a hiatal hernia and left   basilar atelectasis. No new abnormal pulmonary opacity. Partially   visualized cardiac silhouette is stable. Evidence of prior left axillary   surgery.      IMPRESSION:    No acute finding.    --- End of Report ---            PAUL AHN MD; Attending Radiologist  This document has been electronically signed. May 28 2025  1:26PM (05-28-25 @ 11:15)      Bedside Ultrasound:    Tubes/Lines: NGT      GLOBAL ISSUE/BEST PRACTICE:  Analgesia:  Sedation:  HOB elevation: Y  Stress ulcer prophylaxis:  VTE prophylaxis:  Glycemic control:  Nutrition:    CODE STATUS: DNR/DNI

## 2025-05-29 NOTE — PROGRESS NOTE ADULT - ATTENDING COMMENTS
Mrs. Morales is an 89-year-old female with history of breast cancer with bilateral mastectomy presents today due to vomiting and diarrhea found to have GIB requiring admission to ICU.   Neuro: AOx3. Complaining of abdominal pain.  CV: BP labile. Off levophed at 5:15pm today.  Respiratory: CHASE  GI/Heme: UGIB. From CT 5/25/25: Foci of active GI bleeding in the lower esophagus/gastroesophageal junction, with intraluminal pooling of contrast on the delayed phase. 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 Pt is s/p 4 units pRBCs and 1 unit platelets on admission. Overnight received 1 unit pRBCs. On protonix gtt. Placed NGT to suction with >1L coffee ground fluid coming out immediate return. Nothing since. NPO. Holding DVT ppx. SCDs. Maintain active type and screen and 2 large bore IVs. Q6hrs CBCs. Discussed with GI. Will hold off on endoscopy for now given risks.  Renal: Hypernatremia improving.   ID: Leukocytosis. Fever earlier. Suspect that it is probably from the active bleed as procalcitonin is very low, but pt at risk for aspiration pneumonia. Will monitor off abx for now. Cultures sent.  Endocrine: No known history of diabetes. Hemoglobin a1c WNL. FS q6hrs while NPO  Ethics: DNR/DNI.

## 2025-05-29 NOTE — PROGRESS NOTE ADULT - SUBJECTIVE AND OBJECTIVE BOX
[facility]  Octavio Bone NP  [address] [place] [address]  [phone]    INTERVAL HPI/OVERNIGHT EVENTS:  Pt s/e in ICU  Overnight events noted.   NGT placed 900 cc yesterday  scan amount in tube today, mostly coffee ground, no fresh blood      MEDICATIONS  (STANDING):  chlorhexidine 2% Cloths 1 Application(s) Topical <User Schedule>  guaiFENesin  milliGRAM(s) Oral every 12 hours  metoclopramide Injectable 10 milliGRAM(s) IV Push once  norepinephrine Infusion 0.03 MICROgram(s)/kG/Min (2.3 mL/Hr) IV Continuous <Continuous>  pantoprazole Infusion 8 mG/Hr (10 mL/Hr) IV Continuous <Continuous>  potassium chloride  10 mEq/100 mL IVPB 10 milliEquivalent(s) IV Intermittent every 1 hour  simethicone 80 milliGRAM(s) Chew two times a day    MEDICATIONS  (PRN):  acetaminophen     Tablet .. 650 milliGRAM(s) Oral every 6 hours PRN Mild Pain (1 - 3)      Allergies  No Known Allergies      PHYSICAL EXAM:   Vital Signs:  Vital Signs Last 24 Hrs  T(C): 35.9 (28 May 2025 08:08), Max: 38.1 (27 May 2025 20:10)  T(F): 96.7 (28 May 2025 08:08), Max: 100.6 (27 May 2025 20:10)  HR: 97 (28 May 2025 11:01) (81 - 111)  BP: 91/67 (28 May 2025 11:01) (56/21 - 166/79)  BP(mean): 75 (28 May 2025 11:01) (31 - 104)  RR: 17 (28 May 2025 11:01) (15 - 23)  SpO2: 97% (28 May 2025 11:01) (90% - 100%)    Parameters below as of 28 May 2025 08:00  Patient On (Oxygen Delivery Method): room air      Daily     Daily Weight in k.4 (28 May 2025 05:18)    GENERAL:  Appears stated age  HEENT:  NC/AT  CHEST:  Full & symmetric excursion  HEART:  Regular rhythm  ABDOMEN:  Soft, non-tender, non-distended  EXTEREMITIES:  no cyanosis  SKIN:  No rash      LABS:                        11.3   x     )-----------( x        ( 28 May 2025 05:02 )             33.0         145  |  103  |  8   ----------------------------<  164[H]  3.3[L]   |  36[H]  |  0.41[L]    Ca    7.8[L]      28 May 2025 04:36  Phos  3.2       Mg     1.7         TPro  5.1[L]  /  Alb  2.4[L]  /  TBili  0.5  /  DBili  x   /  AST  17  /  ALT  15  /  AlkPhos  54  -    PT/INR - ( 28 May 2025 04:36 )   PT: 12.0 sec;   INR: 1.02 ratio         PTT - ( 28 May 2025 04:36 )  PTT:22.9 sec  Urinalysis Basic - ( 28 May 2025 04:36 )    Color: x / Appearance: x / SG: x / pH: x  Gluc: 164 mg/dL / Ketone: x  / Bili: x / Urobili: x   Blood: x / Protein: x / Nitrite: x   Leuk Esterase: x / RBC: x / WBC x   Sq Epi: x / Non Sq Epi: x / Bacteria: x

## 2025-05-29 NOTE — PROVIDER CONTACT NOTE (OTHER) - ACTION/TREATMENT ORDERED:
IV D/Bonilla, provider assessed at bedside - no extravasation, new 20g placed in R wrist for gtt - BP stable at this time. elevated extremity and warm pack applied. no incident report filed as per ANM. IV D/Bonilla, provider assessed at bedside - no extravasation, new 20g placed in R wrist for gtt - BP stable at this time. elevated extremity and warm pack applied. incident report to be filed. IV D/Bonilla, provider assessed at bedside - new 20g placed in R wrist for gtt - BP stable at this time. elevated extremity and warm pack applied. incident report to be filed.

## 2025-05-29 NOTE — PROGRESS NOTE ADULT - ASSESSMENT
GI bleed  acute blood loss anemia    5/28 black vomiting    Plan:  - s/p 4u pRBC transfusion, hgb now stable  - IV PPI  - hold a/c and asa  - case d/w son Oliverio at [phone] on 5/25  - initially planned for  upper gastrointestinal endoscopy today however given scant NGT output and no further bleeding will defer as  upper gastrointestinal endoscopy will require general anesthesia  - d/w son over phone  d/w icu

## 2025-05-29 NOTE — CASE MANAGEMENT PROGRESS NOTE - NSCMPROGRESSNOTE_GEN_ALL_CORE
Patient discussed during rounds and remains acute in ICU on room air. Dx: GI bleed. Patient receiving IV Protonix and IV Phenylephrine. Pending-EGD today. Patient with home hospice, SW following. CM will continue to collaborate with interdisciplinary team and remain available to assist.

## 2025-05-29 NOTE — CHART NOTE - NSCHARTNOTEFT_GEN_A_CORE
Pt noted to have extravasation of phenylephrine into LUE soft tissue. Infusion was stopped immediately, IV removed, and extremity elevated w/ warm compresses. Skin noted to be ecchymotic with blistering. Phentolamine injected surrounding extravasation site. Total of 10cc injected in 1cc aliquots. Patient tolerated the procedure well.

## 2025-05-29 NOTE — PROGRESS NOTE ADULT - ASSESSMENT
Mrs. Morales is an 89-year-old female with history of breast cancer with bilateral mastectomy presents today due to vomiting and diarrhea found to have GIB requiring admission to ICU.     Neuro:  - AOx3.  -Takes tylenol @ home PRN for pain but is currently comfortable.     CV:   -Hypotensive this AM during RR. Received 1L LR & 1u PRBCs. Not requiring pressors as BP responding well to volume resuscitation   - Monitor routine hemodynamics.   -EKG nonischemic. No Cp or SOB.  -Volume - euvolemic.   Rhythm No issues    Respiratory:   -Saturating well on RA.    GI/Heme:   UGIB. From CT 5/25/25: Foci of active GI bleeding in the lower esophagus/gastroesophageal junction, with intraluminal pooling of contrast on the delayed phase. 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 Gastric distention with complex fluid and heterogeneous debris may represent blood products.  - Pt is s/p 4 units pRBCs and 1 unit platelets. Receiving another unit of blood this AM.  - RESTART Protonix gtt given cont'd UGIB this AM.  -NPO  - Daily CBCs unless has further bleeding.   -Holding DVT ppx. SCDs.   -Maintain active type and screen and 2 large bore IVs.   -Since NGT with minimal output and Hgb stable will defer EGB today due to risk of general anesthesia  -GI consulted, recs appreciated    Renal:   -Re-start D5 LR given hypernatremia and NPO status.  -Daily CMPs      Endocrine:   -No known history of diabetes.  - A1C 5.6    ID:  -Pt remains afebrile despite one elevated T of 100.6. WBC up trending but likely reactive 2/2 UGIB.  -BCx NGTD  -F/U UCX  -Cxray no acute pathology   -Procal wnl  -Monitor off abx     Ethics: DNR/DNI. Pt was on hospice, but, as per son, it was mostly for additional support. She had a significant decline after previous admission as [facility] for a fall.

## 2025-05-29 NOTE — PROGRESS NOTE ADULT - SUBJECTIVE AND OBJECTIVE BOX
INTERVAL HPI/OVERNIGHT EVENTS:  Patient seen and examined at bedside. States she is feeling overall better. Denies any chest pain, SOB, abd pain at this time. Complains of sore throat due to the NGT. Minimal output from NGT overnight- EGD deferred per GI after discussing with patient and family.    ROS: All other review of systems is negative unless indicated above.    MEDICATIONS  (STANDING):  chlorhexidine 2% Cloths 1 Application(s) Topical <User Schedule>  dextrose 5% + lactated ringers. 1000 milliLiter(s) (75 mL/Hr) IV Continuous <Continuous>  pantoprazole Infusion 8 mG/Hr (10 mL/Hr) IV Continuous <Continuous>  phenylephrine    Infusion 0.5 MICROgram(s)/kG/Min (7.65 mL/Hr) IV Continuous <Continuous>    MEDICATIONS  (PRN):  aluminum hydroxide/magnesium hydroxide/simethicone Suspension 30 milliLiter(s) Oral every 4 hours PRN Dyspepsia      Allergies    No Known Allergies    Intolerances          Vital Signs Last 24 Hrs  T(C): 36.9 (29 May 2025 17:16), Max: 37.2 (29 May 2025 11:59)  T(F): 98.5 (29 May 2025 17:16), Max: 98.9 (29 May 2025 11:59)  HR: 94 (29 May 2025 19:00) (84 - 112)  BP: 136/66 (29 May 2025 19:00) (75/54 - 156/70)  BP(mean): 85 (29 May 2025 19:00) (54 - 125)  RR: 18 (29 May 2025 19:00) (13 - 25)  SpO2: 100% (29 May 2025 19:00) (77% - 100%)    Parameters below as of 29 May 2025 07:00  Patient On (Oxygen Delivery Method): room air        05-28 @ 07:01  -  05-29 @ 07:00  --------------------------------------------------------  IN: 599.5 mL / OUT: 1600 mL / NET: -1000.5 mL    05-29 @ 07:01  -  05-29 @ 19:35  --------------------------------------------------------  IN: 933.3 mL / OUT: 500 mL / NET: 433.3 mL        Physical Exam:   Gen: Elderly female, frail, lying in bed comfortably   Neuro: AAOX3  HEENT: NC/AT, +NGT with dark material  Resp: Lungs CTA BL. No accessory muscle use. Speaking in full sentences  CVS: +S1, S2. RRR.   Abd: soft, NT, ND. Bowel sounds present  Ext: Trace LE edema, +hypertrophic toenails        LABS:                        9.6    20.44 )-----------( 149      ( 29 May 2025 05:35 )             28.3     05-29    148[H]  |  107  |  22  ----------------------------<  124[H]  3.5   |  36[H]  |  0.48[L]    Ca    7.6[L]      29 May 2025 05:35  Phos  2.7     05-29  Mg     1.7     05-29    TPro  4.8[L]  /  Alb  2.1[L]  /  TBili  0.4  /  DBili  x   /  AST  15  /  ALT  10[L]  /  AlkPhos  50  05-29    PT/INR - ( 28 May 2025 04:36 )   PT: 12.0 sec;   INR: 1.02 ratio         PTT - ( 28 May 2025 04:36 )  PTT:22.9 sec  Urinalysis Basic - ( 29 May 2025 05:35 )    Color: x / Appearance: x / SG: x / pH: x  Gluc: 124 mg/dL / Ketone: x  / Bili: x / Urobili: x   Blood: x / Protein: x / Nitrite: x   Leuk Esterase: x / RBC: x / WBC x   Sq Epi: x / Non Sq Epi: x / Bacteria: x        RADIOLOGY & ADDITIONAL TESTS:

## 2025-05-30 LAB
ALBUMIN SERPL ELPH-MCNC: 2.1 G/DL — LOW (ref 3.3–5)
ALP SERPL-CCNC: 51 U/L — SIGNIFICANT CHANGE UP (ref 40–120)
ALT FLD-CCNC: 12 U/L — SIGNIFICANT CHANGE UP (ref 12–78)
ANION GAP SERPL CALC-SCNC: 5 MMOL/L — SIGNIFICANT CHANGE UP (ref 5–17)
AST SERPL-CCNC: 11 U/L — LOW (ref 15–37)
BASOPHILS # BLD AUTO: 0.02 K/UL — SIGNIFICANT CHANGE UP (ref 0–0.2)
BASOPHILS NFR BLD AUTO: 0.1 % — SIGNIFICANT CHANGE UP (ref 0–2)
BILIRUB SERPL-MCNC: 0.5 MG/DL — SIGNIFICANT CHANGE UP (ref 0.2–1.2)
BUN SERPL-MCNC: 10 MG/DL — SIGNIFICANT CHANGE UP (ref 7–23)
CALCIUM SERPL-MCNC: 7.8 MG/DL — LOW (ref 8.5–10.1)
CHLORIDE SERPL-SCNC: 108 MMOL/L — SIGNIFICANT CHANGE UP (ref 96–108)
CO2 SERPL-SCNC: 33 MMOL/L — HIGH (ref 22–31)
CREAT SERPL-MCNC: 0.31 MG/DL — LOW (ref 0.5–1.3)
CULTURE RESULTS: SIGNIFICANT CHANGE UP
CULTURE RESULTS: SIGNIFICANT CHANGE UP
EGFR: 101 ML/MIN/1.73M2 — SIGNIFICANT CHANGE UP
EGFR: 101 ML/MIN/1.73M2 — SIGNIFICANT CHANGE UP
EOSINOPHIL # BLD AUTO: 0.31 K/UL — SIGNIFICANT CHANGE UP (ref 0–0.5)
EOSINOPHIL NFR BLD AUTO: 2.3 % — SIGNIFICANT CHANGE UP (ref 0–6)
GLUCOSE SERPL-MCNC: 123 MG/DL — HIGH (ref 70–99)
HCT VFR BLD CALC: 23.5 % — LOW (ref 34.5–45)
HCT VFR BLD CALC: 24.7 % — LOW (ref 34.5–45)
HGB BLD-MCNC: 7.8 G/DL — LOW (ref 11.5–15.5)
HGB BLD-MCNC: 8.2 G/DL — LOW (ref 11.5–15.5)
IMM GRANULOCYTES NFR BLD AUTO: 0.7 % — SIGNIFICANT CHANGE UP (ref 0–0.9)
LYMPHOCYTES # BLD AUTO: 1.12 K/UL — SIGNIFICANT CHANGE UP (ref 1–3.3)
LYMPHOCYTES # BLD AUTO: 8.3 % — LOW (ref 13–44)
MAGNESIUM SERPL-MCNC: 1.7 MG/DL — SIGNIFICANT CHANGE UP (ref 1.6–2.6)
MCHC RBC-ENTMCNC: 31.3 PG — SIGNIFICANT CHANGE UP (ref 27–34)
MCHC RBC-ENTMCNC: 31.3 PG — SIGNIFICANT CHANGE UP (ref 27–34)
MCHC RBC-ENTMCNC: 33.2 G/DL — SIGNIFICANT CHANGE UP (ref 32–36)
MCHC RBC-ENTMCNC: 33.2 G/DL — SIGNIFICANT CHANGE UP (ref 32–36)
MCV RBC AUTO: 94.3 FL — SIGNIFICANT CHANGE UP (ref 80–100)
MCV RBC AUTO: 94.4 FL — SIGNIFICANT CHANGE UP (ref 80–100)
MONOCYTES # BLD AUTO: 0.77 K/UL — SIGNIFICANT CHANGE UP (ref 0–0.9)
MONOCYTES NFR BLD AUTO: 5.7 % — SIGNIFICANT CHANGE UP (ref 2–14)
NEUTROPHILS # BLD AUTO: 11.22 K/UL — HIGH (ref 1.8–7.4)
NEUTROPHILS NFR BLD AUTO: 82.9 % — HIGH (ref 43–77)
NRBC BLD AUTO-RTO: 0 /100 WBCS — SIGNIFICANT CHANGE UP (ref 0–0)
NRBC BLD AUTO-RTO: 0 /100 WBCS — SIGNIFICANT CHANGE UP (ref 0–0)
PHOSPHATE SERPL-MCNC: 2 MG/DL — LOW (ref 2.5–4.5)
PLATELET # BLD AUTO: 131 K/UL — LOW (ref 150–400)
PLATELET # BLD AUTO: 132 K/UL — LOW (ref 150–400)
POTASSIUM SERPL-MCNC: 3 MMOL/L — LOW (ref 3.5–5.3)
POTASSIUM SERPL-SCNC: 3 MMOL/L — LOW (ref 3.5–5.3)
PROT SERPL-MCNC: 4.9 G/DL — LOW (ref 6–8.3)
RBC # BLD: 2.49 M/UL — LOW (ref 3.8–5.2)
RBC # BLD: 2.62 M/UL — LOW (ref 3.8–5.2)
RBC # FLD: 17.9 % — HIGH (ref 10.3–14.5)
RBC # FLD: 17.9 % — HIGH (ref 10.3–14.5)
SODIUM SERPL-SCNC: 146 MMOL/L — HIGH (ref 135–145)
SPECIMEN SOURCE: SIGNIFICANT CHANGE UP
SPECIMEN SOURCE: SIGNIFICANT CHANGE UP
WBC # BLD: 13.54 K/UL — HIGH (ref 3.8–10.5)
WBC # BLD: 14.32 K/UL — HIGH (ref 3.8–10.5)
WBC # FLD AUTO: 13.54 K/UL — HIGH (ref 3.8–10.5)
WBC # FLD AUTO: 14.32 K/UL — HIGH (ref 3.8–10.5)

## 2025-05-30 PROCEDURE — 99233 SBSQ HOSP IP/OBS HIGH 50: CPT | Mod: GC

## 2025-05-30 PROCEDURE — 99233 SBSQ HOSP IP/OBS HIGH 50: CPT

## 2025-05-30 RX ORDER — MAGNESIUM SULFATE 500 MG/ML
2 SYRINGE (ML) INJECTION ONCE
Refills: 0 | Status: COMPLETED | OUTPATIENT
Start: 2025-05-30 | End: 2025-05-30

## 2025-05-30 RX ORDER — POTASSIUM PHOSPHATE, MONOBASIC POTASSIUM PHOSPHATE, DIBASIC INJECTION, 236; 224 MG/ML; MG/ML
30 SOLUTION, CONCENTRATE INTRAVENOUS ONCE
Refills: 0 | Status: COMPLETED | OUTPATIENT
Start: 2025-05-30 | End: 2025-05-30

## 2025-05-30 RX ADMIN — Medication 10 MG/HR: at 13:08

## 2025-05-30 RX ADMIN — Medication 25 GRAM(S): at 12:59

## 2025-05-30 RX ADMIN — POTASSIUM PHOSPHATE, MONOBASIC POTASSIUM PHOSPHATE, DIBASIC INJECTION, 83.33 MILLIMOLE(S): 236; 224 SOLUTION, CONCENTRATE INTRAVENOUS at 10:21

## 2025-05-30 RX ADMIN — Medication 10 MG/HR: at 01:34

## 2025-05-30 RX ADMIN — Medication 1 APPLICATION(S): at 05:01

## 2025-05-30 RX ADMIN — Medication 40 MILLIEQUIVALENT(S): at 10:29

## 2025-05-30 NOTE — PROGRESS NOTE ADULT - ASSESSMENT
89F Breast CA and HTN who was on Home Hospice admitted for Acute Blood Loss Anemia and Hypovolemic Shock     Acute Blood Loss Anemia / Hypovolemic Shock  S/P Transfusion of 5u pRBC + 1U Platelets;  Required Pressors on admission, titrated off, but restarted due to persistent hypotension. Now off vasopressors again on 5/30  Given continued melanotic BM's and dark vomitus holding off on any anti-htn medications currently  PPI Infusion and monitor HgB,  transfuse as necessary  Initially family did not want to pursue EGD given the requirement for General Anesthesia  For now, holding off on endoscopic evaluation, will monitor closely    Electrolyte Abnormalities  Hypernatremia - Free Water Deficit; Increase free water intake if possible or will substitute with D5W  Sodium improving  Hypokalemia - Replete  Check Mag  Adv diet  Supplement phos and K     Breast CA  History of Bilateral Mastectomy  Was on Home Hospice prior    HTN  Hold all anti-hypertensives given Acute Blood Loss    Diet  Adv to clear liquids    VTE Prophylaxis: Pharm agents contraindicated in setting of GI bleed SCDs  Disposition: Guarded prognosis, plan per ICU  #GI ppx: cont PPI  #Diet: Diet, Clear Liquid: Moderately Thick Liquids (MODTHICKLIQS) (05-30-25 @ 14:06) [Active]  #Activity: Activity - Increase As Tolerated:   Time/Priority:  Routine (05-28-25 @ 05:18) [Active]  #ACP: DNR/DNI

## 2025-05-30 NOTE — PROGRESS NOTE ADULT - ATTENDING COMMENTS
Mrs. Morales is an 89-year-old female with history of breast cancer with bilateral mastectomy presents today due to vomiting and diarrhea found to have GIB requiring admission to ICU. Now improve.  Neuro: AOx3. Complaining of abdominal pain.  CV: BP labile. Off levophed at 5:15pm today.  Respiratory: CHASE  GI/Heme: UGIB. From CT 5/25/25: Foci of active GI bleeding in the lower esophagus/gastroesophageal junction, with intraluminal pooling of contrast on the delayed phase. 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 Pt is s/p 4 units pRBCs and 1 unit platelets on admission. Overnight received 1 unit pRBCs. On protonix gtt. Placed NGT to suction with >1L coffee ground fluid coming out immediate return. Nothing since. NPO. Holding DVT ppx. SCDs. Maintain active type and screen and 2 large bore IVs. Q6hrs CBCs. Discussed with GI. Will hold off on endoscopy for now given risks.  Renal: Hypernatremia improving.   ID: Leukocytosis. Fever earlier. Suspect that it is probably from the active bleed as procalcitonin is very low, but pt at risk for aspiration pneumonia. Will monitor off abx for now. Cultures sent.  Endocrine: No known history of diabetes. Hemoglobin a1c WNL. FS q6hrs while NPO  Ethics: DNR/DNI. Mrs. Morales is an 89-year-old female with history of breast cancer with bilateral mastectomy presents today due to vomiting and diarrhea found to have GIB requiring admission to ICU. Now improve.  Neuro: AOx3. Complaining of abdominal pain.  CV: Pressure within normal limites with MAP >65 consistently. Off pressors since yesterday  Respiratory: CHASE  GI/Heme: UGIB. From CT 5/25/25: Foci of active GI bleeding in the lower esophagus/gastroesophageal junction, with intraluminal pooling of contrast on the delayed phase. 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 Pt is s/p 5 units pRBCs. On protonix gtt. NGT removed today and thickened clear liquid diet started; pt having a lot of clear oral secretions after having NGT. Holding DVT ppx. SCDs. Maintain active type and screen and 2 large bore IVs. Discussed with GI. Will hold off on endoscopy for now given risks.  Renal: Hypernatremia improving.   ID: Leukocytosis downtrending. Cultures negative. Monitor off antibiotics.  Endocrine: No known history of diabetes. Hemoglobin a1c WNL.   Ethics: DNR/DNI.  Patient stable for downgrade to telemetry.  PT ordered; pt refusing to get out of bed.

## 2025-05-30 NOTE — PROGRESS NOTE ADULT - ASSESSMENT
Mrs. Morales is an 89-year-old female with history of breast cancer with bilateral mastectomy presents today due to vomiting and diarrhea found to have GIB requiring admission to ICU.     Neuro:  - AOx3.  -Takes tylenol @ home PRN for pain but is currently comfortable.     CV:   -Hypotensive this AM during RR. Received 1L LR & 1u PRBCs. Not requiring pressors as BP responding well to volume resuscitation   - Monitor routine hemodynamics.   -EKG nonischemic. No Cp or SOB.  -Volume - euvolemic.   Rhythm No issues    Respiratory:   -Saturating well on RA.    GI/Heme:   UGIB. From CT 5/25/25: Foci of active GI bleeding in the lower esophagus/gastroesophageal junction, with intraluminal pooling of contrast on the delayed phase. 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 Gastric distention with complex fluid and heterogeneous debris may represent blood products.  - Pt is s/p 4 units pRBCs and 1 unit platelets. Receiving another unit of blood this AM.  - RESTART Protonix gtt given cont'd UGIB this AM.  -NPO  - Daily CBCs unless has further bleeding.   -Holding DVT ppx. SCDs.   -Maintain active type and screen and 2 large bore IVs.   -Since NGT with minimal output and Hgb stable will defer EGB today due to risk of general anesthesia  -GI consulted, recs appreciated    Renal:   -Re-start D5 LR given hypernatremia and NPO status.  -Daily CMPs      Endocrine:   -No known history of diabetes.  - A1C 5.6    ID:  -Pt remains afebrile despite one elevated T of 100.6. WBC up trending but likely reactive 2/2 UGIB.  -BCx NGTD  -F/U UCX  -Cxray no acute pathology   -Procal wnl  -Monitor off abx     Ethics: DNR/DNI. Pt was on hospice, but, as per son, it was mostly for additional support. She had a significant decline after previous admission as [facility] for a fall.     Mrs. Morales is an 89-year-old female with history of breast cancer with bilateral mastectomy presents today due to vomiting and diarrhea found to have GIB requiring admission to ICU.     Neuro:  - AOx3. At baseline mental status   -Takes tylenol @ home PRN for pain but is currently comfortable.     CV:   -s/p  1L LR & total of 5 PRBCs. Not requiring pressors as BP responding well to volume resuscitation   - Monitor routine hemodynamics.   -EKG nonischemic. No Cp or SOB.  -Volume - euvolemic.   -Rhythm No issues    Respiratory:   -Saturating well on RA.    GI/Heme:   UGIB. From CT 5/25/25: Foci of active GI bleeding in the lower esophagus/gastroesophageal junction, with intraluminal pooling of contrast on the delayed phase. 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 Gastric distention with complex fluid and heterogeneous debris may represent blood products.  - Pt is s/p 5 units pRBCs and 1 unit platelets.   -C/w protonix gtt  -NPO, will clamp NGT and assess swallowing at bedside today   - Daily CBCs unless has further bleeding.   -Holding DVT ppx. SCDs.   -Maintain active type and screen and 2 large bore IVs.   -Since NGT with minimal output and Hgb stable will defer EGD due to risk of general anesthesia  -GI consulted, recs appreciated    Renal:   -C/w D5 LR given hypernatremia and NPO status.  -Daily CMPs      Endocrine:   -No known history of diabetes.  - A1C 5.6    ID:  -Pt remains afebrile despite one elevated T of 100.6. WBC up trending but likely reactive 2/2 UGIB.  -BCx NGTD  -F/U UCX  -Cxray no acute pathology   -Procal wnl  -Monitor off abx     Ethics: DNR/DNI. Pt was on hospice, but, as per son, it was mostly for additional support. She had a significant decline after previous admission as [facility] for a fall.    Dispo: PT miracleal

## 2025-05-30 NOTE — PROGRESS NOTE ADULT - SUBJECTIVE AND OBJECTIVE BOX
INCOMPLETE NOTE - FULL NOTE TO FOLLOW    Name: KAM FUENTES  MRN: [id number]  LOCATION: \[facility]\"" ICU1 12A    ----  Patient is a 89y old  Female who presents with a chief complaint of GIB (29 May 2025 16:18)      FROM ADMISSION H+P:   HPI:  89-year-old female with past medical history of breast cancer with bilateral mastectomy presents today due to vomiting and diarrhea     ----  INTERVAL HPI/OVERNIGHT EVENTS: Pt seen and evaluated at the bedside. No acute overnight events occurred. The patient reports abd discomfort. She describes it as someone tying a string around her abdomen and pulling. Pain is mild. Pain migrates throughout the abdomen and "comes and goes". No associated nausea or vomiting. She has no fever. She reports feeling intermittent episodes of shortness of breath. She also has had some episodes of "chest pain" but she describes episodic epigastric gnawing which is associated w/ a bad taste in her mouth. She notes foul breath. She has some edema in her peripheral extremities which is causing some discomfort. The pt's long time aide is at bedside. They both asked some questions which I addressed. No new acute concerns verbalized at this time    ----  PAST MEDICAL & SURGICAL HISTORY:  Hypertension      No significant past surgical history          FAMILY HISTORY:      Allergies    No Known Allergies    Intolerances        ----  REVIEW OF SYSTEMS:  CONSTITUTIONAL: denies fever, chills, fatigue, weakness  HEENT: denies blurred vision, sore throat  CARDIOVASCULAR: denies chest pain, chest pressure, palpitations  RESPIRATORY: denies shortness of breath, sputum production  GASTROINTESTINAL: denies nausea, vomiting, diarrhea, abdominal pain  NEUROLOGICAL: denies numbness, headache, focal weakness  MUSCULOSKELETAL: denies new joint pain, muscle aches    ----  PHYSICAL EXAM:  GENERAL: patient appears well, no acute distress  ENMT: oropharynx clear without erythema, moist mucous membranes  LUNGS: good air entry bilaterally, clear to auscultation, symmetric breath sounds, no wheezing or rhonchi appreciated  HEART: S1/S2, regular rate and rhythm, no murmurs noted, no noted edema to b/l LE  GASTROINTESTINAL: abdomen is soft, nontender, nondistended, normoactive bowel sounds, no palpable masses  MUSCULOSKELETAL: no clubbing or cyanosis, no obvious deformity  NEUROLOGIC: awake, alert, readily interactive, good muscle tone in 4 extremities    T(C): 37 (05-30-25 @ 07:59), Max: 37.3 (05-29-25 @ 20:55)  HR: 88 (05-30-25 @ 07:00) (84 - 111)  BP: 130/72 (05-30-25 @ 07:00) (92/75 - 156/70)  RR: 13 (05-30-25 @ 07:00) (11 - 25)  SpO2: 98% (05-30-25 @ 07:00) (77% - 100%)  Wt(kg): --    ----  INTAKE & OUTPUT:  I&O's Summary    29 May 2025 07:01  -  30 May 2025 07:00  --------------------------------------------------------  IN: 1568.3 mL / OUT: 1000 mL / NET: 568.3 mL        LABS:                        8.2    13.54 )-----------( 131      ( 30 May 2025 05:50 )             24.7     05-30    146[H]  |  108  |  10  ----------------------------<  123[H]  3.0[L]   |  33[H]  |  0.31[L]    Ca    7.8[L]      30 May 2025 05:50  Phos  2.0     05-30  Mg     1.7     05-30    TPro  4.9[L]  /  Alb  2.1[L]  /  TBili  0.5  /  DBili  x   /  AST  11[L]  /  ALT  12  /  AlkPhos  51  05-30      Urinalysis Basic - ( 30 May 2025 05:50 )    Color: x / Appearance: x / SG: x / pH: x  Gluc: 123 mg/dL / Ketone: x  / Bili: x / Urobili: x   Blood: x / Protein: x / Nitrite: x   Leuk Esterase: x / RBC: x / WBC x   Sq Epi: x / Non Sq Epi: x / Bacteria: x      CAPILLARY BLOOD GLUCOSE      POCT Blood Glucose.: 126 mg/dL (30 May 2025 05:00)  POCT Blood Glucose.: 171 mg/dL (29 May 2025 17:33)  POCT Blood Glucose.: 147 mg/dL (29 May 2025 12:05)        Culture - Blood (collected 05-28-25 @ 11:20)  Source: Blood Blood-Peripheral  Preliminary Report (05-29-25 @ 16:01):    No growth at 24 hours    Culture - Blood (collected 05-28-25 @ 11:00)  Source: Blood Blood-Peripheral  Preliminary Report (05-29-25 @ 16:01):    No growth at 24 hours        ----  DATA REVIEW:  Personally reviewed:  -Vital sign trends:    -Laboratory results:    -Radiology results:    -Microbio results:    -Consultant recommendations:              ----  ACTIVE MEDICATIONS:  aluminum hydroxide/magnesium hydroxide/simethicone Suspension 30 milliLiter(s) Oral every 4 hours PRN  chlorhexidine 2% Cloths 1 Application(s) Topical <User Schedule>  dextrose 5% + lactated ringers. 1000 milliLiter(s) IV Continuous <Continuous>  pantoprazole Infusion 8 mG/Hr IV Continuous <Continuous>  phenylephrine    Infusion 0.5 MICROgram(s)/kG/Min IV Continuous <Continuous>  potassium chloride   Powder 40 milliEquivalent(s) Oral once  potassium phosphate IVPB 30 milliMole(s) IV Intermittent once       Name: KAM FUENTES  MRN: [id number]  LOCATION: [facility] 12A    ----  Patient is a 89y old  Female who presents with a chief complaint of GIB (29 May 2025 16:18)      FROM ADMISSION H+P:   HPI:  89-year-old female with past medical history of breast cancer with bilateral mastectomy presents today due to vomiting and diarrhea     ----  INTERVAL HPI/OVERNIGHT EVENTS: Pt seen and evaluated at the bedside. No acute overnight events occurred. The patient reports abd discomfort. She describes it as someone tying a string around her abdomen and pulling. Pain is mild. Pain migrates throughout the abdomen and "comes and goes". No associated nausea or vomiting. She has no fever. She reports feeling intermittent episodes of shortness of breath. She also has had some episodes of "chest pain" but she describes episodic epigastric gnawing which is associated w/ a bad taste in her mouth. She notes foul breath. She has some edema in her peripheral extremities which is causing some discomfort. The pt's long time aide is at bedside. They both asked some questions which I addressed. No new acute concerns verbalized at this time    ----  PAST MEDICAL & SURGICAL HISTORY:  Hypertension      No significant past surgical history          FAMILY HISTORY:      Allergies    No Known Allergies    Intolerances        ----  REVIEW OF SYSTEMS:  CONSTITUTIONAL: denies fever, chills   HEENT: denies blurred vision, sore throat  CARDIOVASCULAR: denies chest pressure, palpitations  RESPIRATORY: As detailed above in the HPI  GASTROINTESTINAL: Patient reports abdominal pain as detailed above  NEUROLOGICAL: denies numbness, headache, focal weakness  MUSCULOSKELETAL: denies new joint pain, muscle aches    ----  PHYSICAL EXAM:  GENERAL: patient appears advanced age, frail, deconditioned  ENMT: oropharynx clear without erythema, dry mucous membranes, NG tube  LUNGS: Reduced air entry bilaterally, clear to auscultation, no accessory muscle use, no focal rhonchi  HEART: S1/S2, regular rate, distant heart sounds, peripheral edema noted, worse in the left upper extremity but affecting all 4 extremities  GASTROINTESTINAL: abdomen is softly distended, hypoactive bowel sounds, minimally tender to palpation  MUSCULOSKELETAL: no clubbing or cyanosis, no obvious deformity  NEUROLOGIC: awake, alert, readily interactive, good muscle tone in 4 extremities    T(C): 37 (05-30-25 @ 07:59), Max: 37.3 (05-29-25 @ 20:55)  HR: 88 (05-30-25 @ 07:00) (84 - 111)  BP: 130/72 (05-30-25 @ 07:00) (92/75 - 156/70)  RR: 13 (05-30-25 @ 07:00) (11 - 25)  SpO2: 98% (05-30-25 @ 07:00) (77% - 100%)  Wt(kg): --    ----  INTAKE & OUTPUT:  I&O's Summary    29 May 2025 07:01  -  30 May 2025 07:00  --------------------------------------------------------  IN: 1568.3 mL / OUT: 1000 mL / NET: 568.3 mL        LABS:                        8.2    13.54 )-----------( 131      ( 30 May 2025 05:50 )             24.7     05-30    146[H]  |  108  |  10  ----------------------------<  123[H]  3.0[L]   |  33[H]  |  0.31[L]    Ca    7.8[L]      30 May 2025 05:50  Phos  2.0     05-30  Mg     1.7     05-30    TPro  4.9[L]  /  Alb  2.1[L]  /  TBili  0.5  /  DBili  x   /  AST  11[L]  /  ALT  12  /  AlkPhos  51  05-30      Urinalysis Basic - ( 30 May 2025 05:50 )    Color: x / Appearance: x / SG: x / pH: x  Gluc: 123 mg/dL / Ketone: x  / Bili: x / Urobili: x   Blood: x / Protein: x / Nitrite: x   Leuk Esterase: x / RBC: x / WBC x   Sq Epi: x / Non Sq Epi: x / Bacteria: x      CAPILLARY BLOOD GLUCOSE      POCT Blood Glucose.: 126 mg/dL (30 May 2025 05:00)  POCT Blood Glucose.: 171 mg/dL (29 May 2025 17:33)  POCT Blood Glucose.: 147 mg/dL (29 May 2025 12:05)        Culture - Blood (collected 05-28-25 @ 11:20)  Source: Blood Blood-Peripheral  Preliminary Report (05-29-25 @ 16:01):    No growth at 24 hours    Culture - Blood (collected 05-28-25 @ 11:00)  Source: Blood Blood-Peripheral  Preliminary Report (05-29-25 @ 16:01):    No growth at 24 hours    ----  DATA REVIEW:  Personally reviewed:  -Vital sign trends: Afebrile, heart rate elevated, BP soft but stable, maintaining O2 sats on room air  -Laboratory results: Hemoglobin downtrending, potassium low, phosphorus low, renal indices are stable  -Radiology results: Chest x-ray reviewed from May 28, enlarged gastric bubble  -Microbio results: Blood cultures negative from admission  -Consultant recommendations: Spoke with the ICU team regarding potential for downgrade later today pending clinical course    ----  ACTIVE MEDICATIONS:  aluminum hydroxide/magnesium hydroxide/simethicone Suspension 30 milliLiter(s) Oral every 4 hours PRN  chlorhexidine 2% Cloths 1 Application(s) Topical <User Schedule>  dextrose 5% + lactated ringers. 1000 milliLiter(s) IV Continuous <Continuous>  pantoprazole Infusion 8 mG/Hr IV Continuous <Continuous>  phenylephrine    Infusion 0.5 MICROgram(s)/kG/Min IV Continuous <Continuous>  potassium chloride   Powder 40 milliEquivalent(s) Oral once  potassium phosphate IVPB 30 milliMole(s) IV Intermittent once

## 2025-05-30 NOTE — CASE MANAGEMENT PROGRESS NOTE - NSCMPROGRESSNOTE_GEN_ALL_CORE
Patient discussed during rounds and remains acute in ICU on room air. Dx: GI bleed. Patient receiving IVPB Magnesium sulfate, IV Protonix and IVP Potassium Phosphate. Pending-s/s eval and possible NGT removal. Possible downgrade to medical floor. Patient with home hospice, SW following. No weekend discharge per medical team. CM will continue to collaborate with interdisciplinary team and remain available to assist.

## 2025-05-30 NOTE — PROGRESS NOTE ADULT - SUBJECTIVE AND OBJECTIVE BOX
Patient is a 89y old  Female who presents with a chief complaint of GIB (30 May 2025 08:22)    SUBJECTIVE/INTERVAL HPI: Pt seen and examined at bedside. No overnight events occurred. Pt c/o increased phlegm this AM. Pt otherwise denies SOB, HA, dizziness, nausea and fever/chills.       REVIEW OF SYSTEMS  Constitutional: No fever, chills, fatigue  Neuro: No headache, numbness, weakness  Resp: No cough, wheezing, shortness of breath, +phlegm  CVS: No chest pain, palpitations, leg swelling  GI: No abdominal pain, nausea, vomiting, diarrhea   : No dysuria, frequency, incontinence  Skin: No itching, burning, rashes, or lesions   Msk: No joint pain or swelling  Psych: No depression, anxiety, mood swings  Heme: No bleeding    T(F): 98.6 (05-30-25 @ 07:59), Max: 99.2 (05-29-25 @ 20:55)  HR: 88 (05-30-25 @ 07:00) (84 - 111)  BP: 130/72 (05-30-25 @ 07:00) (92/75 - 156/70)  RR: 13 (05-30-25 @ 07:00) (11 - 25)  SpO2: 98% (05-30-25 @ 07:00) (77% - 100%)  Wt(kg): --            I&O's Summary    05-29 @ 07:01  -  05-30 @ 07:00  --------------------------------------------------------  IN: 1568.3 mL / OUT: 1000 mL / NET: 568.3 mL      PHYSICAL EXAM  Gen: Elderly female, lying in bed comfortably   Neuro: AAOX3  HEENT: NC/AT  Resp: Lungs CTA BL. No accessory m. use. Speaking in full sentences  CVS: +S1, S2. RRR.   Abd: soft, NT, ND. Bowel sounds present  Ext: Trace LE edema  Skin:  WWP    MEDICATIONS    phenylephrine    Infusion IV Continuous              aluminum hydroxide/magnesium hydroxide/simethicone Suspension Oral PRN  pantoprazole Infusion IV Continuous      dextrose 5% + lactated ringers. IV Continuous  potassium chloride   Powder Oral  potassium phosphate IVPB IV Intermittent      chlorhexidine 2% Cloths Topical                            8.2    13.54 )-----------( 131      ( 30 May 2025 05:50 )             24.7       05-30    146[H]  |  108  |  10  ----------------------------<  123[H]  3.0[L]   |  33[H]  |  0.31[L]    Ca    7.8[L]      30 May 2025 05:50  Phos  2.0     05-30  Mg     1.7     05-30    TPro  4.9[L]  /  Alb  2.1[L]  /  TBili  0.5  /  DBili  x   /  AST  11[L]  /  ALT  12  /  AlkPhos  51  05-30            Urinalysis Basic - ( 30 May 2025 05:50 )    Color: x / Appearance: x / SG: x / pH: x  Gluc: 123 mg/dL / Ketone: x  / Bili: x / Urobili: x   Blood: x / Protein: x / Nitrite: x   Leuk Esterase: x / RBC: x / WBC x   Sq Epi: x / Non Sq Epi: x / Bacteria: x      Blood Blood-Peripheral   No growth at 24 hours -- 05-28 @ 11:20  Blood Blood-Peripheral   No growth at 24 hours -- 05-28 @ 11:00  Blood Blood-Peripheral   No growth at 4 days -- 05-25 @ 10:20  Blood Blood-Peripheral   No growth at 4 days -- 05-25 @ 10:10          Tubes/Lines: NGT      GLOBAL ISSUE/BEST PRACTICE:  Analgesia:  Sedation:  HOB elevation: Y  Stress ulcer prophylaxis:  VTE prophylaxis:  Glycemic control:  Nutrition:    CODE STATUS: DNR/DNI         Patient is a 89y old  Female who presents with a chief complaint of GIB (30 May 2025 08:22)    SUBJECTIVE/INTERVAL HPI: Pt seen and examined at bedside. No overnight events occurred. Pt c/o increased phlegm this AM. Pt otherwise denies SOB, HA, dizziness, nausea and fever/chills.       REVIEW OF SYSTEMS  Constitutional: No fever, chills, fatigue  Neuro: No headache, numbness, weakness  Resp: No cough, wheezing, shortness of breath, +phlegm  CVS: No chest pain, palpitations, leg swelling  GI: No abdominal pain, nausea, vomiting, diarrhea   : No dysuria, frequency, incontinence  Skin: No itching, burning, rashes, or lesions   Msk: No joint pain or swelling  Psych: No depression, anxiety, mood swings  Heme: No bleeding    T(F): 98.6 (05-30-25 @ 07:59), Max: 99.2 (05-29-25 @ 20:55)  HR: 88 (05-30-25 @ 07:00) (84 - 111)  BP: 130/72 (05-30-25 @ 07:00) (92/75 - 156/70)  RR: 13 (05-30-25 @ 07:00) (11 - 25)  SpO2: 98% (05-30-25 @ 07:00) (77% - 100%)  Wt(kg): --            I&O's Summary    05-29 @ 07:01  -  05-30 @ 07:00  --------------------------------------------------------  IN: 1568.3 mL / OUT: 1000 mL / NET: 568.3 mL      PHYSICAL EXAM  Gen: Elderly female, lying in bed comfortably   Neuro: AAOX3, awake and alert   HEENT: NC/AT  Resp: Lungs CTA BL. No accessory m. use. Speaking in full sentences  CVS: +S1, S2. RRR.   Abd: soft, NT, ND. Bowel sounds present  Ext: Trace LE edema  Skin:  WWP    MEDICATIONS    phenylephrine    Infusion IV Continuous              aluminum hydroxide/magnesium hydroxide/simethicone Suspension Oral PRN  pantoprazole Infusion IV Continuous      dextrose 5% + lactated ringers. IV Continuous  potassium chloride   Powder Oral  potassium phosphate IVPB IV Intermittent      chlorhexidine 2% Cloths Topical                            8.2    13.54 )-----------( 131      ( 30 May 2025 05:50 )             24.7       05-30    146[H]  |  108  |  10  ----------------------------<  123[H]  3.0[L]   |  33[H]  |  0.31[L]    Ca    7.8[L]      30 May 2025 05:50  Phos  2.0     05-30  Mg     1.7     05-30    TPro  4.9[L]  /  Alb  2.1[L]  /  TBili  0.5  /  DBili  x   /  AST  11[L]  /  ALT  12  /  AlkPhos  51  05-30            Urinalysis Basic - ( 30 May 2025 05:50 )    Color: x / Appearance: x / SG: x / pH: x  Gluc: 123 mg/dL / Ketone: x  / Bili: x / Urobili: x   Blood: x / Protein: x / Nitrite: x   Leuk Esterase: x / RBC: x / WBC x   Sq Epi: x / Non Sq Epi: x / Bacteria: x      Blood Blood-Peripheral   No growth at 24 hours -- 05-28 @ 11:20  Blood Blood-Peripheral   No growth at 24 hours -- 05-28 @ 11:00  Blood Blood-Peripheral   No growth at 4 days -- 05-25 @ 10:20  Blood Blood-Peripheral   No growth at 4 days -- 05-25 @ 10:10          Tubes/Lines: NGT      GLOBAL ISSUE/BEST PRACTICE:  Analgesia:  Sedation:  HOB elevation: Y  Stress ulcer prophylaxis:  VTE prophylaxis:  Glycemic control:  Nutrition:    CODE STATUS: DNR/DNI

## 2025-05-30 NOTE — PROGRESS NOTE ADULT - SUBJECTIVE AND OBJECTIVE BOX
[facility]    Octavio Bone NP    [address] [id number] [phone]      Chief Complaint:  Patient is a 89y old  Female who presents with a chief complaint of GIB (30 May 2025 08:31)      HPI/ 24 hr events:   Patient seen and examined at bedside  No acute GI complaints  Per nursing staff, NGT with scant output and no overt signs of GI bleeding   Hgb 9.6 --> 8.2 today AM      REVIEW OF SYSTEMS:   General: Negative  HEENT: Negative  CV: Negative  Respiratory: Negative  GI: See HPI  : Negative  MSK: Negative  Hematologic: Negative  Skin: Negative    MEDICATIONS:   MEDICATIONS  (STANDING):  chlorhexidine 2% Cloths 1 Application(s) Topical <User Schedule>  dextrose 5% + lactated ringers. 1000 milliLiter(s) (75 mL/Hr) IV Continuous <Continuous>  pantoprazole Infusion 8 mG/Hr (10 mL/Hr) IV Continuous <Continuous>    MEDICATIONS  (PRN):  aluminum hydroxide/magnesium hydroxide/simethicone Suspension 30 milliLiter(s) Oral every 4 hours PRN Dyspepsia      ALLERGIES:   Allergies    No Known Allergies    Intolerances        VITAL SIGNS:   Vital Signs Last 24 Hrs  T(C): 37 (30 May 2025 11:59), Max: 37.3 (29 May 2025 20:55)  T(F): 98.6 (30 May 2025 11:59), Max: 99.2 (29 May 2025 20:55)  HR: 88 (30 May 2025 10:00) (88 - 107)  BP: 152/55 (30 May 2025 10:00) (92/75 - 156/70)  BP(mean): 85 (30 May 2025 10:00) (74 - 125)  RR: 15 (30 May 2025 10:00) (11 - 25)  SpO2: 100% (30 May 2025 10:00) (77% - 100%)    Parameters below as of 30 May 2025 07:00  Patient On (Oxygen Delivery Method): room air      I&O's Summary    29 May 2025 07:01  -  30 May 2025 07:00  --------------------------------------------------------  IN: 1578.3 mL / OUT: 1000 mL / NET: 578.3 mL    30 May 2025 07:01  -  30 May 2025 13:34  --------------------------------------------------------  IN: 20 mL / OUT: 0 mL / NET: 20 mL        PHYSICAL EXAM:   GENERAL:  No acute distress  HEENT:  NC/AT, NGT  CHEST:  No increased effort  HEART:  Regular rate  ABDOMEN:  Soft, non-tender, non-distended  EXTREMITIES: No cyanosis  SKIN:  Warm, dry  NEURO:  Calm, cooperative    LABS:                        8.2    13.54 )-----------( 131      ( 30 May 2025 05:50 )             24.7     05-30    146[H]  |  108  |  10  ----------------------------<  123[H]  3.0[L]   |  33[H]  |  0.31[L]    Ca    7.8[L]      30 May 2025 05:50  Phos  2.0     05-30  Mg     1.7     05-30    TPro  4.9[L]  /  Alb  2.1[L]  /  TBili  0.5  /  DBili  x   /  AST  11[L]  /  ALT  12  /  AlkPhos  51  05-30    LIVER FUNCTIONS - ( 30 May 2025 05:50 )  Alb: 2.1 g/dL / Pro: 4.9 g/dL / ALK PHOS: 51 U/L / ALT: 12 U/L / AST: 11 U/L / GGT: x               Culture - Blood (collected 28 May 2025 11:20)  Source: Blood Blood-Peripheral  Preliminary Report (29 May 2025 16:01):    No growth at 24 hours    Culture - Blood (collected 28 May 2025 11:00)  Source: Blood Blood-Peripheral  Preliminary Report (29 May 2025 16:01):    No growth at 24 hours                                    RADIOLOGY & ADDITIONAL STUDIES:

## 2025-05-30 NOTE — PROGRESS NOTE ADULT - ASSESSMENT
GI bleed  Acute blood loss anemia    5/28 black vomiting    Initial Hgb 10.5  s/p total of 5u pRBC  Today AM Hgb 8.2     Plan:  - Case d/w son Oliverio at [phone] on 5/25  - Initially planned for upper gastrointestinal endoscopy on 5/29, however, given scant NGT output and no further bleeding will defer as EGD will require general anesthesia  - Trend CBC, transfuse PRN   - Monitor stools and NGT for signs of GI bleeding   - Avoid NSAIDs   - IV PPI  - Hold A/C and asa  - Rest of care per ICU   - Will follow

## 2025-05-31 LAB
ALBUMIN SERPL ELPH-MCNC: 2 G/DL — LOW (ref 3.3–5)
ALP SERPL-CCNC: 57 U/L — SIGNIFICANT CHANGE UP (ref 40–120)
ALT FLD-CCNC: 12 U/L — SIGNIFICANT CHANGE UP (ref 12–78)
ANION GAP SERPL CALC-SCNC: 7 MMOL/L — SIGNIFICANT CHANGE UP (ref 5–17)
AST SERPL-CCNC: 20 U/L — SIGNIFICANT CHANGE UP (ref 15–37)
BILIRUB SERPL-MCNC: 0.6 MG/DL — SIGNIFICANT CHANGE UP (ref 0.2–1.2)
BUN SERPL-MCNC: 8 MG/DL — SIGNIFICANT CHANGE UP (ref 7–23)
CALCIUM SERPL-MCNC: 7.9 MG/DL — LOW (ref 8.5–10.1)
CHLORIDE SERPL-SCNC: 109 MMOL/L — HIGH (ref 96–108)
CO2 SERPL-SCNC: 25 MMOL/L — SIGNIFICANT CHANGE UP (ref 22–31)
CREAT SERPL-MCNC: 0.23 MG/DL — LOW (ref 0.5–1.3)
EGFR: 108 ML/MIN/1.73M2 — SIGNIFICANT CHANGE UP
EGFR: 108 ML/MIN/1.73M2 — SIGNIFICANT CHANGE UP
GLUCOSE SERPL-MCNC: 79 MG/DL — SIGNIFICANT CHANGE UP (ref 70–99)
MAGNESIUM SERPL-MCNC: 2.3 MG/DL — SIGNIFICANT CHANGE UP (ref 1.6–2.6)
PHOSPHATE SERPL-MCNC: 2.7 MG/DL — SIGNIFICANT CHANGE UP (ref 2.5–4.5)
POTASSIUM SERPL-MCNC: 4.5 MMOL/L — SIGNIFICANT CHANGE UP (ref 3.5–5.3)
POTASSIUM SERPL-SCNC: 4.5 MMOL/L — SIGNIFICANT CHANGE UP (ref 3.5–5.3)
PROT SERPL-MCNC: 5 G/DL — LOW (ref 6–8.3)
SODIUM SERPL-SCNC: 141 MMOL/L — SIGNIFICANT CHANGE UP (ref 135–145)

## 2025-05-31 PROCEDURE — 99233 SBSQ HOSP IP/OBS HIGH 50: CPT

## 2025-05-31 RX ADMIN — Medication 10 MG/HR: at 17:45

## 2025-05-31 RX ADMIN — Medication 1 APPLICATION(S): at 10:41

## 2025-05-31 RX ADMIN — Medication 10 MG/HR: at 10:40

## 2025-05-31 RX ADMIN — Medication 10 MG/HR: at 10:37

## 2025-05-31 RX ADMIN — Medication 10 MG/HR: at 04:00

## 2025-05-31 NOTE — PROGRESS NOTE ADULT - SUBJECTIVE AND OBJECTIVE BOX
Patient is a 89y old  Female who presents with a chief complaint of GIB (30 May 2025 13:33)      Subjective:  INTERVAL HPI/OVERNIGHT EVENTS: Patient seen and examined at bedside. No overnight events occurred. Patient has no complaints at this time. Denies fevers, chills, headache, lightheadedness, chest pain, dyspnea, abdominal pain, n/v/d/c.    MEDICATIONS  (STANDING):  chlorhexidine 2% Cloths 1 Application(s) Topical <User Schedule>  dextrose 5% + lactated ringers. 1000 milliLiter(s) (75 mL/Hr) IV Continuous <Continuous>  pantoprazole Infusion 8 mG/Hr (10 mL/Hr) IV Continuous <Continuous>    MEDICATIONS  (PRN):  aluminum hydroxide/magnesium hydroxide/simethicone Suspension 30 milliLiter(s) Oral every 4 hours PRN Dyspepsia      Allergies    No Known Allergies    Intolerances        REVIEW OF SYSTEMS:  CONSTITUTIONAL: No fever or chills  RESPIRATORY: No cough, wheezing, or shortness of breath  CARDIOVASCULAR: No chest pain, palpitations  GASTROINTESTINAL: No abd pain, nausea, vomiting, or diarrhea  MUSCULOSKELETAL: b/l arm pain/swelling/tightness - per patient this is from frequent blood draws    Objective:  Vital Signs Last 24 Hrs  T(C): 36.6 (31 May 2025 06:00), Max: 37.3 (30 May 2025 16:18)  T(F): 97.8 (31 May 2025 06:00), Max: 99.2 (30 May 2025 16:18)  HR: 95 (31 May 2025 06:00) (77 - 95)  BP: 133/70 (31 May 2025 06:00) (98/45 - 138/69)  BP(mean): 71 (30 May 2025 19:00) (61 - 86)  RR: 18 (31 May 2025 06:00) (12 - 22)  SpO2: 95% (31 May 2025 06:00) (90% - 100%)    Parameters below as of 31 May 2025 06:00  Patient On (Oxygen Delivery Method): room air        GENERAL: NAD, lying in bed comfortably  HEAD:  Atraumatic, Normocephalic  EYES: EOMI, PERRLA, conjunctiva and sclera clear  ENT: Moist mucous membranes  NECK: Supple, No JVD  CHEST/LUNG: Clear to auscultation bilaterally; No rales, rhonchi, wheezing, or rubs. Unlabored respirations  HEART: Regular rate and rhythm; No murmurs, rubs, or gallops  ABDOMEN: Bowel sounds present; Soft, Nontender, Nondistended. No hepatomegaly  EXTREMITIES:  2+ Peripheral Pulses, brisk capillary refill. No clubbing, cyanosis, or edema  NERVOUS SYSTEM:  Alert & Oriented X3, speech clear. No deficits   MSK: FROM all 4 extremities, full and equal strength  SKIN: No rashes or lesions    LABS:                        7.8    14.32 )-----------( 132      ( 30 May 2025 15:30 )             23.5     CBC Full  -  ( 30 May 2025 15:30 )  WBC Count : 14.32 K/uL  Hemoglobin : 7.8 g/dL  Hematocrit : 23.5 %  Platelet Count - Automated : 132 K/uL  Mean Cell Volume : 94.4 fl  Mean Cell Hemoglobin : 31.3 pg  Mean Cell Hemoglobin Concentration : 33.2 g/dL  Auto Neutrophil # : x  Auto Lymphocyte # : x  Auto Monocyte # : x  Auto Eosinophil # : x  Auto Basophil # : x  Auto Neutrophil % : x  Auto Lymphocyte % : x  Auto Monocyte % : x  Auto Eosinophil % : x  Auto Basophil % : x    31 May 2025 06:55    141    |  109    |  8      ----------------------------<  79     4.5     |  25     |  0.23     Ca    7.9        31 May 2025 06:55  Phos  2.7       31 May 2025 06:55  Mg     2.3       31 May 2025 06:55    TPro  5.0    /  Alb  2.0    /  TBili  0.6    /  DBili  x      /  AST  20     /  ALT  12     /  AlkPhos  57     31 May 2025 06:55      Urinalysis Basic - ( 31 May 2025 06:55 )    Color: x / Appearance: x / SG: x / pH: x  Gluc: 79 mg/dL / Ketone: x  / Bili: x / Urobili: x   Blood: x / Protein: x / Nitrite: x   Leuk Esterase: x / RBC: x / WBC x   Sq Epi: x / Non Sq Epi: x / Bacteria: x      CAPILLARY BLOOD GLUCOSE      POCT Blood Glucose.: 116 mg/dL (30 May 2025 17:13)  POCT Blood Glucose.: 112 mg/dL (30 May 2025 12:49)        Culture - Blood (collected 05-28-25 @ 11:20)  Source: Blood Blood-Peripheral  Preliminary Report (05-30-25 @ 16:01):    No growth at 48 Hours    Culture - Blood (collected 05-28-25 @ 11:00)  Source: Blood Blood-Peripheral  Preliminary Report (05-30-25 @ 16:01):    No growth at 48 Hours    Culture - Blood (collected 05-25-25 @ 10:20)  Source: Blood Blood-Peripheral  Final Report (05-30-25 @ 14:00):    No growth at 5 days    Culture - Blood (collected 05-25-25 @ 10:10)  Source: Blood Blood-Peripheral  Final Report (05-30-25 @ 14:00):    No growth at 5 days        RADIOLOGY & ADDITIONAL TESTS:    Personally reviewed.     Consultant(s) Notes Reviewed:  [x] YES  [ ] NO

## 2025-05-31 NOTE — PROGRESS NOTE ADULT - ASSESSMENT
89y.o. Female with GI bleed  Acute blood loss anemia    5/28 black vomiting    Initial Hgb 10.5  s/p total of 5u pRBC  Yesterday AM Hgb 7.8     Plan:  - Case d/w son Oliverio at [phone] on 5/25  - Initially planned for upper gastrointestinal endoscopy on 5/29, however, given scant NGT output and no further bleeding will defer as EGD will require general anesthesia  - Trend CBC, transfuse PRN   - Monitor stools for signs of GI bleeding   - Avoid NSAIDs   - IV PPI  - Hold A/C and asa  - Will follow       I reviewed the overnight course of events on the unit, re-confirming the patient history. I discussed the care with the patient  Differential diagnosis and plan of care discussed with patient after the evaluation  35 minutes spent on total encounter of which more than fifty percent of the encounter was spent counseling and/or coordinating care by the attending physician.

## 2025-05-31 NOTE — PROGRESS NOTE ADULT - ASSESSMENT
89F Breast CA and HTN who was on Home Hospice admitted for Acute Blood Loss Anemia and Hypovolemic Shock     Acute Blood Loss Anemia / Hypovolemic Shock  S/P Transfusion of 5u pRBC + 1U Platelets;  Required Pressors on admission, titrated off, but restarted due to persistent hypotension. Now off vasopressors again on 5/30  Given continued melanotic BM's and dark vomitus holding off on any anti-htn medications currently  PPI Infusion and monitor HgB,  transfuse as necessary  Initially family did not want to pursue EGD given the requirement for General Anesthesia  For now, holding off on endoscopic evaluation, will monitor closely    Electrolyte Abnormalities  Hypernatremia - Free Water Deficit; Increase free water intake if possible or will substitute with D5W   Sodium improved. RESOLVED  Hypokalemia - RESOLVED  Adv diet  Supplement /trend prn    Breast CA  History of Bilateral Mastectomy  Was on Home Hospice prior    HTN  Hold all anti-hypertensives given Acute Blood Loss    Diet  Adv to clear liquids    VTE Prophylaxis: Pharm agents contraindicated in setting of GI bleed SCDs  Disposition: Guarded prognosis, plan per ICU  #GI ppx: cont PPI  #Diet: Diet, Clear Liquid: Moderately Thick Liquids (MODTHICKLIQS) (05-30-25 @ 14:06) [Active]  #Activity: Activity - Increase As Tolerated:   Time/Priority:  Routine (05-28-25 @ 05:18) [Active]  #ACP: DNR/DNI

## 2025-05-31 NOTE — PROGRESS NOTE ADULT - SUBJECTIVE AND OBJECTIVE BOX
[facility]  Octavio Auguste PA-C  [facility] [place] [address] [phone]    INTERVAL HPI/OVERNIGHT EVENTS:  Pt resting comfortably. Downgraded from ICU.  Endorses hunger.  On clear liquid diet.     MEDICATIONS  (STANDING):  chlorhexidine 2% Cloths 1 Application(s) Topical <User Schedule>  dextrose 5% + lactated ringers. 1000 milliLiter(s) (75 mL/Hr) IV Continuous <Continuous>  pantoprazole Infusion 8 mG/Hr (10 mL/Hr) IV Continuous <Continuous>    MEDICATIONS  (PRN):  aluminum hydroxide/magnesium hydroxide/simethicone Suspension 30 milliLiter(s) Oral every 4 hours PRN Dyspepsia    Vital Signs Last 24 Hrs  T(C): 36.8 (31 May 2025 05:22), Max: 37.3 (30 May 2025 16:18)  T(F): 98.2 (31 May 2025 05:22), Max: 99.2 (30 May 2025 16:18)  HR: 87 (31 May 2025 05:22) (77 - 98)  BP: 136/70 (31 May 2025 05:22) (98/45 - 152/55)  BP(mean): 71 (30 May 2025 19:00) (61 - 98)  RR: 18 (31 May 2025 05:22) (12 - 22)  SpO2: 90% (31 May 2025 05:22) (90% - 100%)    Parameters below as of 31 May 2025 05:22  Patient On (Oxygen Delivery Method): room air    Physical:  General: A&Ox3. NAD.  Abdomen: Soft nondistended, nontender.    I&O's Detail    30 May 2025 07:01  -  31 May 2025 07:00  --------------------------------------------------------  IN:    IV PiggyBack: 50 mL    IV PiggyBack: 581 mL    Pantoprazole: 110 mL  Total IN: 741 mL    OUT:    dextrose 5% + lactated ringers: 0 mL    Phenylephrine: 0 mL    Voided (mL): 950 mL  Total OUT: 950 mL    Total NET: -209 mL      31 May 2025 07:01  -  31 May 2025 09:53  --------------------------------------------------------  IN:    Pantoprazole: 120 mL  Total IN: 120 mL    OUT:  Total OUT: 0 mL    Total NET: 120 mL    LABS:                        7.8    14.32 )-----------( 132      ( 30 May 2025 15:30 )             23.5             05-31    141  |  109[H]  |  8   ----------------------------<  79  4.5   |  25  |  0.23[L]    Ca    7.9[L]      31 May 2025 06:55  Phos  2.7     05-31  Mg     2.3     05-31    TPro  5.0[L]  /  Alb  2.0[L]  /  TBili  0.6  /  DBili  x   /  AST  20  /  ALT  12  /  AlkPhos  57  05-31

## 2025-06-01 PROCEDURE — 99233 SBSQ HOSP IP/OBS HIGH 50: CPT

## 2025-06-01 RX ADMIN — Medication 10 MG/HR: at 05:12

## 2025-06-01 RX ADMIN — Medication 1 APPLICATION(S): at 05:07

## 2025-06-01 NOTE — PROGRESS NOTE ADULT - ASSESSMENT
89y.o. Female with GI bleed  Acute blood loss anemia    5/28 black vomiting    Initial Hgb 10.5  s/p total of 5u pRBC  5/30 AM Hgb 7.8     Plan:  - Case d/w son Oliverio at [phone] on 5/25  - Initially planned for upper gastrointestinal endoscopy on 5/29, however, given scant NGT output and no further bleeding will defer as EGD will require general anesthesia  - Trend CBC, transfuse PRN   - Monitor stools for signs of GI bleeding   - Avoid NSAIDs   - IV PPI  - Hold A/C and asa  - Will follow       I reviewed the overnight course of events on the unit, re-confirming the patient history. I discussed the care with the patient  Differential diagnosis and plan of care discussed with patient after the evaluation  35 minutes spent on total encounter of which more than fifty percent of the encounter was spent counseling and/or coordinating care by the attending physician.

## 2025-06-01 NOTE — PHYSICAL THERAPY INITIAL EVALUATION ADULT - ADDITIONAL COMMENTS
patient states she lives in a senior housing and has 24/7 HHA , uses rolling walker , she did ambulate to short distance with rolling walker Independently but has aide always by her side.

## 2025-06-01 NOTE — CHART NOTE - NSCHARTNOTESELECT_GEN_ALL_CORE
Palliative/Event Note
Rapid Response
Event Note
Nutrition Services
Nutrition Services
Rapid Response

## 2025-06-01 NOTE — PROGRESS NOTE ADULT - SUBJECTIVE AND OBJECTIVE BOX
Patient is a 89y old  Female who presents with a chief complaint of GIB (31 May 2025 12:32)      Subjective:  INTERVAL HPI/OVERNIGHT EVENTS: Patient seen and examined at bedside. No overnight events occurred.   There has been difficulty obtaining labs, pt c/o multiple attempts at blood draws. explained we haven't had full labs since the 30th and hemoglobin was trending down at that time, so it is important to obtain labs today.   Spoke to patient son. if hemoglobin continues to downtrend he would like to discuss moving forward with endoscopy with GI.    MEDICATIONS  (STANDING):  chlorhexidine 2% Cloths 1 Application(s) Topical <User Schedule>  dextrose 5% + lactated ringers. 1000 milliLiter(s) (75 mL/Hr) IV Continuous <Continuous>  pantoprazole Infusion 8 mG/Hr (10 mL/Hr) IV Continuous <Continuous>    MEDICATIONS  (PRN):  aluminum hydroxide/magnesium hydroxide/simethicone Suspension 30 milliLiter(s) Oral every 4 hours PRN Dyspepsia      Allergies    No Known Allergies    Intolerances        REVIEW OF SYSTEMS:  CONSTITUTIONAL: No fever or chills  HEENT:  No headache, no sore throat  RESPIRATORY: No cough, wheezing, or shortness of breath  CARDIOVASCULAR: No chest pain, palpitations  GASTROINTESTINAL: No abd pain, nausea, vomiting, or diarrhea  GENITOURINARY: No dysuria, frequency, or hematuria  NEUROLOGICAL: no focal weakness or dizziness  MUSCULOSKELETAL: +arm swelling/soreness from blood draws    Objective:  Vital Signs Last 24 Hrs  T(C): 37.1 (01 Jun 2025 05:38), Max: 37.1 (01 Jun 2025 05:38)  T(F): 98.7 (01 Jun 2025 05:38), Max: 98.7 (01 Jun 2025 05:38)  HR: 81 (01 Jun 2025 05:38) (81 - 82)  BP: 152/77 (01 Jun 2025 05:38) (121/76 - 152/77)  BP(mean): --  RR: 18 (01 Jun 2025 05:38) (18 - 18)  SpO2: 94% (01 Jun 2025 05:38) (94% - 95%)    Parameters below as of 01 Jun 2025 05:38  Patient On (Oxygen Delivery Method): room air    GEN: elderly, frail  HEAD:  Atraumatic, Normocephalic  EYES: EOMI,  conjunctiva and sclera clear  ENT: dry mucous membranes  CHEST/LUNG: Clear to auscultation bilaterally; No rales, rhonchi, wheezing, or rubs. Unlabored respirations  HEART: Regular rate and rhythm; No murmurs, rubs, or gallops  ABDOMEN: Bowel sounds present; Soft, Nontender, Nondistended.   EXTREMITIES:  2+ Peripheral Pulses, brisk capillary refill. No clubbing, cyanosis, or edema. upper extremity edema, scattered ecchymoses   NERVOUS SYSTEM:  Alert & Oriented X3, speech clear. No deficits   SKIN: warm, dry, pale      LABS:    CBC Full  -  ( 30 May 2025 15:30 )  WBC Count : 14.32 K/uL  Hemoglobin : 7.8 g/dL  Hematocrit : 23.5 %  Platelet Count - Automated : 132 K/uL  Mean Cell Volume : 94.4 fl  Mean Cell Hemoglobin : 31.3 pg  Mean Cell Hemoglobin Concentration : 33.2 g/dL  Auto Neutrophil # : x  Auto Lymphocyte # : x  Auto Monocyte # : x  Auto Eosinophil # : x  Auto Basophil # : x  Auto Neutrophil % : x  Auto Lymphocyte % : x  Auto Monocyte % : x  Auto Eosinophil % : x  Auto Basophil % : x      Ca    7.9        31 May 2025 06:55        Urinalysis Basic - ( 31 May 2025 06:55 )    Color: x / Appearance: x / SG: x / pH: x  Gluc: 79 mg/dL / Ketone: x  / Bili: x / Urobili: x   Blood: x / Protein: x / Nitrite: x   Leuk Esterase: x / RBC: x / WBC x   Sq Epi: x / Non Sq Epi: x / Bacteria: x      CAPILLARY BLOOD GLUCOSE            Culture - Blood (collected 05-28-25 @ 11:20)  Source: Blood Blood-Peripheral  Preliminary Report (05-31-25 @ 16:01):    No growth at 72 Hours    Culture - Blood (collected 05-28-25 @ 11:00)  Source: Blood Blood-Peripheral  Preliminary Report (05-31-25 @ 16:01):    No growth at 72 Hours        RADIOLOGY & ADDITIONAL TESTS:    Personally reviewed.     Consultant(s) Notes Reviewed:  [x] YES  [ ] NO

## 2025-06-01 NOTE — CHART NOTE - NSCHARTNOTEFT_GEN_A_CORE
Assessment: Pt seen for nutrition follow-up. Chart reviewed, hospital course noted.    Brief hx: Pt is an "89-year-old female with history of breast cancer with bilateral mastectomy presents today due to vomiting and diarrhea found to have GIB requiring admission to ICU."  NGT removed 5/30. Transferred to floors 5/31.    Visited pt at bedside this am. Pt resting during visit. Pt remains on moderately thick, clear liquids at this time. Receiving IVFs; D5+LR@75ml/hr. Pt reports fair intake of liquids this morning. Pt states that she feels hungry and would like to consume solid food. Pt initially planned for upper gastrointestinal endoscopy on 5/29, however, given scant NGT output and no further bleeding will defer as EGD will require general anesthesia. NGT was removed 5/30. Pt denies N/V at this time. +BM 5/27. Recommend advancing diet when medically feasible. Consider SLP evaluation. RD remains available and will continue to follow-up.     Factors impacting intake: [ ] none [ ] nausea  [ ] vomiting [ ] diarrhea [ ] constipation  [ ]chewing problems [ ] swallowing issues  [X] other: GI bleed    Diet Prescription: Diet, Clear Liquid:   Moderately Thick Liquids (MODTHICKLIQS) (05-30-25 @ 14:06)    Intake: fair intake of clear liquids this morning    Current Weight: Weight (kg): 40.8 (25 May 2025 10:00), 5/27 96.1#, 5/31 93.6# (1+ generalized edema noted)  % Weight Change    Pertinent Medications: MEDICATIONS  (STANDING):  chlorhexidine 2% Cloths 1 Application(s) Topical <User Schedule>  dextrose 5% + lactated ringers. 1000 milliLiter(s) (75 mL/Hr) IV Continuous <Continuous>  pantoprazole Infusion 8 mG/Hr (10 mL/Hr) IV Continuous <Continuous>    MEDICATIONS  (PRN):  aluminum hydroxide/magnesium hydroxide/simethicone Suspension 30 milliLiter(s) Oral every 4 hours PRN Dyspepsia    Pertinent Labs: 05-31 Na141 mmol/L Glu 79 mg/dL K+ 4.5 mmol/L Cr  0.23 mg/dL[L] BUN 8 mg/dL 05-31 Phos 2.7 mg/dL 05-31 Alb 2.0 g/dL[L]    Skin: R shin wound, stage I to sacrum, stage I to BL heels    Estimated Needs:   [X] no change since previous assessment: based on #/45.3kg  30-35kcal/kg (1359-1585kcal)  1.2-1.4g pro/kg (54-63gm protein)  [ ] recalculated:     Previous Nutrition Diagnosis:   [ ] Inadequate Energy Intake [ ]Inadequate Oral Intake [ ] Excessive Energy Intake   [ ] Underweight [ ] Increased Nutrient Needs [ ] Overweight/Obesity   [X] Altered GI Function [ ] Unintended Weight Loss [ ] Food & Nutrition Related Knowledge Deficit [X] Malnutrition     Nutrition Diagnosis is [X] ongoing  [ ] resolved [ ] not applicable     New Nutrition Diagnosis: [X] not applicable     Interventions:   Recommend  [ ] Change Diet To:  [ ] Nutrition Supplement  [ ] Nutrition Support  [X] Other: Recommend advancing diet to soft/bite size when medically feasible; consider SLP evaluation  If pt no longer requires thickened liquids; recommend ensure plus HP for additional kcal/protein  Recommend MVI daily    Monitoring and Evaluation:   [X] PO intake [ x ] Tolerance to diet prescription [ x ] weights [ x ] labs[ x ] follow up per protocol  [X] other: s/s GI distress, bowel function, skin integrity/ edema

## 2025-06-01 NOTE — PROGRESS NOTE ADULT - SUBJECTIVE AND OBJECTIVE BOX
[facility]  Octavio Auguste PA-C  [facility] [place] [address] [phone]    INTERVAL HPI/OVERNIGHT EVENTS:  Pt resting comfortably. No acute complaints.   Tolerating clear liquid diet.   Feeling hungry and would like solid food.  +flatus/-BM.   Denies N/V.    MEDICATIONS  (STANDING):  chlorhexidine 2% Cloths 1 Application(s) Topical <User Schedule>  dextrose 5% + lactated ringers. 1000 milliLiter(s) (75 mL/Hr) IV Continuous <Continuous>  pantoprazole Infusion 8 mG/Hr (10 mL/Hr) IV Continuous <Continuous>    MEDICATIONS  (PRN):  aluminum hydroxide/magnesium hydroxide/simethicone Suspension 30 milliLiter(s) Oral every 4 hours PRN Dyspepsia      Vital Signs Last 24 Hrs  T(C): 37.1 (01 Jun 2025 05:38), Max: 37.1 (01 Jun 2025 05:38)  T(F): 98.7 (01 Jun 2025 05:38), Max: 98.7 (01 Jun 2025 05:38)  HR: 81 (01 Jun 2025 05:38) (81 - 82)  BP: 152/77 (01 Jun 2025 05:38) (121/76 - 152/77)  BP(mean): --  RR: 18 (01 Jun 2025 05:38) (18 - 18)  SpO2: 94% (01 Jun 2025 05:38) (94% - 95%)    Parameters below as of 01 Jun 2025 05:38  Patient On (Oxygen Delivery Method): room air    Physical:  General: A&Ox3. NAD.  Abdomen: Soft nondistended, nontender.    I&O's Detail    31 May 2025 07:01  -  01 Jun 2025 07:00  --------------------------------------------------------  IN:    Pantoprazole: 240 mL  Total IN: 240 mL    OUT:    Voided (mL): 400 mL  Total OUT: 400 mL    Total NET: -160 mL    LABS:                        7.8    14.32 )-----------( 132      ( 30 May 2025 15:30 )             23.5             05-31    141  |  109[H]  |  8   ----------------------------<  79  4.5   |  25  |  0.23[L]    Ca    7.9[L]      31 May 2025 06:55  Phos  2.7     05-31  Mg     2.3     05-31    TPro  5.0[L]  /  Alb  2.0[L]  /  TBili  0.6  /  DBili  x   /  AST  20  /  ALT  12  /  AlkPhos  57  05-31

## 2025-06-01 NOTE — PROGRESS NOTE ADULT - ASSESSMENT
89F Breast CA and HTN who was on Home Hospice admitted for Acute Blood Loss Anemia and Hypovolemic Shock     Acute Blood Loss Anemia / Hypovolemic Shock  S/P Transfusion of 5u pRBC + 1U Platelets;  Required Pressors on admission, titrated off, but restarted due to persistent hypotension. Now off vasopressors again on 5/30  Given continued melanotic BM's and dark vomitus holding off on any anti-htn medications currently  PPI Infusion and monitor HgB,  transfuse as necessary  Initially family did not want to pursue EGD given the requirement for General Anesthesia  For now, holding off on endoscopic evaluation, will monitor closely  need repeat labs/type and screen. if hemoglobin remains low will need to reassess for EGD- d/w son    Electrolyte Abnormalities  Hypernatremia - Free Water Deficit; Increase free water intake if possible or will substitute with D5W   Sodium improved. RESOLVED  Hypokalemia - RESOLVED  Adv diet  Supplement /trend prn    Breast CA  History of Bilateral Mastectomy  Was on Home Hospice prior    HTN  Hold all anti-hypertensives given Acute Blood Loss    Diet  Adv to clear liquids    VTE Prophylaxis: Pharm agents contraindicated in setting of GI bleed SCDs  Disposition:   #GI ppx: cont PPI  #Diet: Diet, Clear Liquid: Moderately Thick Liquids (MODTHICKLIQS) (05-30-25 @ 14:06) [Active]  #Activity: Activity - Increase As Tolerated:   Time/Priority:  Routine (05-28-25 @ 05:18) [Active]  #ACP: DNR/DNI

## 2025-06-02 LAB
ANION GAP SERPL CALC-SCNC: 12 MMOL/L — SIGNIFICANT CHANGE UP (ref 5–17)
BUN SERPL-MCNC: 5 MG/DL — LOW (ref 7–23)
CALCIUM SERPL-MCNC: 8.3 MG/DL — LOW (ref 8.5–10.1)
CHLORIDE SERPL-SCNC: 103 MMOL/L — SIGNIFICANT CHANGE UP (ref 96–108)
CO2 SERPL-SCNC: 25 MMOL/L — SIGNIFICANT CHANGE UP (ref 22–31)
CREAT SERPL-MCNC: 0.21 MG/DL — LOW (ref 0.5–1.3)
CULTURE RESULTS: SIGNIFICANT CHANGE UP
CULTURE RESULTS: SIGNIFICANT CHANGE UP
EGFR: 110 ML/MIN/1.73M2 — SIGNIFICANT CHANGE UP
EGFR: 110 ML/MIN/1.73M2 — SIGNIFICANT CHANGE UP
GLUCOSE SERPL-MCNC: 93 MG/DL — SIGNIFICANT CHANGE UP (ref 70–99)
HCT VFR BLD CALC: 26.7 % — LOW (ref 34.5–45)
HGB BLD-MCNC: 8.5 G/DL — LOW (ref 11.5–15.5)
MCHC RBC-ENTMCNC: 30.6 PG — SIGNIFICANT CHANGE UP (ref 27–34)
MCHC RBC-ENTMCNC: 31.8 G/DL — LOW (ref 32–36)
MCV RBC AUTO: 96 FL — SIGNIFICANT CHANGE UP (ref 80–100)
NRBC BLD AUTO-RTO: 0 /100 WBCS — SIGNIFICANT CHANGE UP (ref 0–0)
PLATELET # BLD AUTO: 293 K/UL — SIGNIFICANT CHANGE UP (ref 150–400)
POTASSIUM SERPL-MCNC: 3.3 MMOL/L — LOW (ref 3.5–5.3)
POTASSIUM SERPL-SCNC: 3.3 MMOL/L — LOW (ref 3.5–5.3)
RBC # BLD: 2.78 M/UL — LOW (ref 3.8–5.2)
RBC # FLD: 17.2 % — HIGH (ref 10.3–14.5)
SODIUM SERPL-SCNC: 140 MMOL/L — SIGNIFICANT CHANGE UP (ref 135–145)
SPECIMEN SOURCE: SIGNIFICANT CHANGE UP
SPECIMEN SOURCE: SIGNIFICANT CHANGE UP
WBC # BLD: 9.89 K/UL — SIGNIFICANT CHANGE UP (ref 3.8–10.5)
WBC # FLD AUTO: 9.89 K/UL — SIGNIFICANT CHANGE UP (ref 3.8–10.5)

## 2025-06-02 PROCEDURE — 99233 SBSQ HOSP IP/OBS HIGH 50: CPT

## 2025-06-02 RX ADMIN — Medication 40 MILLIEQUIVALENT(S): at 18:45

## 2025-06-02 RX ADMIN — Medication 10 MG/HR: at 05:03

## 2025-06-02 RX ADMIN — Medication 40 MILLIGRAM(S): at 18:45

## 2025-06-02 RX ADMIN — Medication 30 MILLILITER(S): at 14:25

## 2025-06-02 RX ADMIN — Medication 1 APPLICATION(S): at 05:04

## 2025-06-02 RX ADMIN — Medication 40 MILLIEQUIVALENT(S): at 14:18

## 2025-06-02 NOTE — PROGRESS NOTE ADULT - ASSESSMENT
89F Breast CA and HTN who was on Home Hospice admitted for Acute Blood Loss Anemia and Hypovolemic Shock     Acute Blood Loss Anemia / Hypovolemic Shock  S/P Transfusion of 5u pRBC + 1U Platelets;  Required Pressors on admission, titrated off, but restarted due to persistent hypotension. Now off vasopressors again on 5/30  Given continued melanotic BM's and dark vomitus holding off on any anti-htn medications currently  PPI Infusion and monitor HgB,  transfuse as necessary  Initially family did not want to pursue EGD given the requirement for General Anesthesia  For now, holding off on endoscopic evaluation, will monitor closely  6/1- hemoglobin improved from prior. d/w gi will advance diet. if tolerates will start planning for d/c to [facility].    Electrolyte Abnormalities  Hypernatremia - Free Water Deficit; Increase free water intake if possible or will substitute with D5W   Sodium improved. RESOLVED  Hypokalemia - RESOLVED  Adv diet  Supplement /trend prn    Breast CA  History of Bilateral Mastectomy  Was on Home Hospice prior    HTN  Hold all anti-hypertensives given Acute Blood Loss    Diet  Adv diet as tolerated    VTE Prophylaxis: Pharm agents contraindicated in setting of GI bleed SCDs  Disposition:   #GI ppx: cont PPI  #Diet: Diet, Clear Liquid: Moderately Thick Liquids (MODTHICKLIQS) (05-30-25 @ 14:06) [Active]  #Activity: Activity - Increase As Tolerated:   Time/Priority:  Routine (05-28-25 @ 05:18) [Active]  #ACP: DNR/DNI

## 2025-06-02 NOTE — PROGRESS NOTE ADULT - ASSESSMENT
89y.o. Female with GI bleed  Acute blood loss anemia    5/28 black vomiting    Initial Hgb 10.5  s/p total of 5u pRBC  5/30 AM Hgb 7.8     Plan:  - Case d/w son Oliverio at [phone] on 5/25  - Initially planned for upper gastrointestinal endoscopy on 5/29, however, given scant NGT output and no further bleeding will defer as EGD will require general anesthesia  - Trend CBC, transfuse PRN   - Monitor stools for signs of GI bleeding   - Avoid NSAIDs   - IV PPI  - Hold A/C and asa  - Advanced diet to pureed with mod thick liquids  - Will follow

## 2025-06-02 NOTE — PHARMACOTHERAPY INTERVENTION NOTE - COMMENTS
Patient is 89y F presenting with GI bleed being treated with pantoprazole continuous infusion starting 5/28. Discussed with GI, no plan currently for endoscopy, recommended transition to pantoprazole 40 mg IVP BID. Accepted and order entered.    Latha Walter, PharmD  Clinical Pharmacy Specialist   [phone] or Teams

## 2025-06-02 NOTE — CAREGIVER ENGAGEMENT NOTE - CAREGIVER EDUCATION NOTES - FREE TEXT
Per PT, dc recommendations are for [facility] at this time. JOHN met with pt and spoke with son Oliverio. Pt is ambivalent to [facility] and didn't want to decide until she gets blood work back. JOHN also spoke with son Oliverio, he is in agreement with [facility] if pt is also in agreement. JOHN emailed [facility] list to Oliverio at [email or web address] requested. JOHN riggs continue to follow.

## 2025-06-02 NOTE — PATIENT CHOICE NOTE. - NSPTCHOICESTATE_GEN_ALL_CORE

## 2025-06-02 NOTE — PROGRESS NOTE ADULT - SUBJECTIVE AND OBJECTIVE BOX
Patient is a 89y old  Female who presents with a chief complaint of GIB (01 Jun 2025 13:17)      Subjective:  INTERVAL HPI/OVERNIGHT EVENTS: Patient seen and examined at bedside. No overnight events occurred.   Patient had blood drawn and IV replaced yesterday by ultrasound . hemoglobin improved from prior (5/30) pt eager to have diet advanced.     MEDICATIONS  (STANDING):  chlorhexidine 2% Cloths 1 Application(s) Topical <User Schedule>  pantoprazole Infusion 8 mG/Hr (10 mL/Hr) IV Continuous <Continuous>    MEDICATIONS  (PRN):  aluminum hydroxide/magnesium hydroxide/simethicone Suspension 30 milliLiter(s) Oral every 4 hours PRN Dyspepsia      Allergies    No Known Allergies    Intolerances        REVIEW OF SYSTEMS:  CONSTITUTIONAL: No fever or chills  RESPIRATORY: No cough, wheezing, or shortness of breath  CARDIOVASCULAR: No chest pain, palpitations  GASTROINTESTINAL: No abd pain, nausea, vomiting, or diarrhea  NEUROLOGICAL: +generalized weakness   MUSCULOSKELETAL: no myalgias     Objective:  Vital Signs Last 24 Hrs  T(C): 36.7 (02 Jun 2025 04:52), Max: 37 (01 Jun 2025 21:30)  T(F): 98.1 (02 Jun 2025 04:52), Max: 98.6 (01 Jun 2025 21:30)  HR: 89 (02 Jun 2025 04:52) (89 - 96)  BP: 101/62 (02 Jun 2025 04:52) (101/62 - 135/68)  BP(mean): --  RR: 18 (02 Jun 2025 04:52) (18 - 18)  SpO2: 95% (02 Jun 2025 04:52) (91% - 98%)    Parameters below as of 02 Jun 2025 04:52  Patient On (Oxygen Delivery Method): room air    GEN: elderly, frail  HEAD:  Atraumatic, Normocephalic  EYES: EOMI,  conjunctiva and sclera clear  ENT: dry mucous membranes  CHEST/LUNG: Clear to auscultation bilaterally; No rales, rhonchi, wheezing, or rubs. Unlabored respirations  HEART: Regular rate and rhythm; No murmurs, rubs, or gallops  ABDOMEN: Bowel sounds present; Soft, Nontender, Nondistended.   EXTREMITIES:  2+ Peripheral Pulses, brisk capillary refill. No clubbing, cyanosis, or edema. upper extremity edema, scattered ecchymoses   NERVOUS SYSTEM:  Alert & Oriented X3, speech clear. No deficits   SKIN: warm, dry, pale      LABS:                        8.5    9.89  )-----------( 293      ( 02 Jun 2025 08:00 )             26.7     CBC Full  -  ( 02 Jun 2025 08:00 )  WBC Count : 9.89 K/uL  Hemoglobin : 8.5 g/dL  Hematocrit : 26.7 %  Platelet Count - Automated : 293 K/uL  Mean Cell Volume : 96.0 fl  Mean Cell Hemoglobin : 30.6 pg  Mean Cell Hemoglobin Concentration : 31.8 g/dL  Auto Neutrophil # : x  Auto Lymphocyte # : x  Auto Monocyte # : x  Auto Eosinophil # : x  Auto Basophil # : x  Auto Neutrophil % : x  Auto Lymphocyte % : x  Auto Monocyte % : x  Auto Eosinophil % : x  Auto Basophil % : x    02 Jun 2025 08:00    140    |  103    |  5      ----------------------------<  93     3.3     |  25     |  0.21     Ca    8.3        02 Jun 2025 08:00    TPro  5.4    /  Alb  2.2    /  TBili  0.6    /  DBili  x      /  AST  12     /  ALT  11     /  AlkPhos  57     01 Jun 2025 16:40      Urinalysis Basic - ( 02 Jun 2025 08:00 )    Color: x / Appearance: x / SG: x / pH: x  Gluc: 93 mg/dL / Ketone: x  / Bili: x / Urobili: x   Blood: x / Protein: x / Nitrite: x   Leuk Esterase: x / RBC: x / WBC x   Sq Epi: x / Non Sq Epi: x / Bacteria: x      CAPILLARY BLOOD GLUCOSE      POCT Blood Glucose.: 100 mg/dL (02 Jun 2025 07:55)        Culture - Blood (collected 05-28-25 @ 11:20)  Source: Blood Blood-Peripheral  Preliminary Report (06-01-25 @ 16:00):    No growth at 4 days    Culture - Blood (collected 05-28-25 @ 11:00)  Source: Blood Blood-Peripheral  Preliminary Report (06-01-25 @ 16:00):    No growth at 4 days        RADIOLOGY & ADDITIONAL TESTS:    Personally reviewed.     Consultant(s) Notes Reviewed:  [x] YES  [ ] NO

## 2025-06-03 ENCOUNTER — TRANSCRIPTION ENCOUNTER (OUTPATIENT)
Age: 89
End: 2025-06-03

## 2025-06-03 LAB
HCT VFR BLD CALC: 27.3 % — LOW (ref 34.5–45)
HGB BLD-MCNC: 8.8 G/DL — LOW (ref 11.5–15.5)
MCHC RBC-ENTMCNC: 31.1 PG — SIGNIFICANT CHANGE UP (ref 27–34)
MCHC RBC-ENTMCNC: 32.2 G/DL — SIGNIFICANT CHANGE UP (ref 32–36)
MCV RBC AUTO: 96.5 FL — SIGNIFICANT CHANGE UP (ref 80–100)
NRBC BLD AUTO-RTO: 0 /100 WBCS — SIGNIFICANT CHANGE UP (ref 0–0)
PLATELET # BLD AUTO: 285 K/UL — SIGNIFICANT CHANGE UP (ref 150–400)
RBC # BLD: 2.83 M/UL — LOW (ref 3.8–5.2)
RBC # FLD: 17.4 % — HIGH (ref 10.3–14.5)
WBC # BLD: 10.77 K/UL — HIGH (ref 3.8–10.5)
WBC # FLD AUTO: 10.77 K/UL — HIGH (ref 3.8–10.5)

## 2025-06-03 PROCEDURE — 99233 SBSQ HOSP IP/OBS HIGH 50: CPT

## 2025-06-03 RX ORDER — ONDANSETRON HCL/PF 4 MG/2 ML
4 VIAL (ML) INJECTION ONCE
Refills: 0 | Status: COMPLETED | OUTPATIENT
Start: 2025-06-03 | End: 2025-06-03

## 2025-06-03 RX ORDER — ACETAMINOPHEN 500 MG/5ML
600 LIQUID (ML) ORAL ONCE
Refills: 0 | Status: COMPLETED | OUTPATIENT
Start: 2025-06-03 | End: 2025-06-03

## 2025-06-03 RX ADMIN — Medication 30 MILLILITER(S): at 14:21

## 2025-06-03 RX ADMIN — Medication 4 MILLIGRAM(S): at 18:51

## 2025-06-03 RX ADMIN — Medication 40 MILLIGRAM(S): at 18:51

## 2025-06-03 RX ADMIN — Medication 40 MILLIGRAM(S): at 06:37

## 2025-06-03 NOTE — PROGRESS NOTE ADULT - ASSESSMENT
89F Breast CA and HTN who was on Home Hospice admitted for Acute Blood Loss Anemia and Hypovolemic Shock     Acute Blood Loss Anemia / Hypovolemic Shock  S/P Transfusion of 5u pRBC + 1U Platelets;  Required Pressors on admission, titrated off, but restarted due to persistent hypotension. Now off vasopressors again on 5/30  Given continued melanotic BM's and dark vomitus holding off on any anti-htn medications currently  PPI Infusion and monitor HgB,  transfuse as necessary  Initially family did not want to pursue EGD given the requirement for General Anesthesia  For now, holding off on endoscopic evaluation, will monitor closely  6/1- hemoglobin improved from prior. d/w gi will advance diet. if tolerates will start planning for d/c to [facility].  6/3- family prefers d/c home. reeval swallowing to try to advance diet.     Electrolyte Abnormalities  Hypernatremia - Free Water Deficit; Increase free water intake if possible or will substitute with D5W   Sodium improved. RESOLVED  Hypokalemia - RESOLVED  Adv diet  Supplement /trend prn    Breast CA  History of Bilateral Mastectomy  Was on Home Hospice prior    HTN  Hold all anti-hypertensives given Acute Blood Loss    Diet  Adv diet as tolerated    VTE Prophylaxis: Pharm agents contraindicated in setting of GI bleed SCDs  Disposition:   #GI ppx: cont PPI  #Activity: Activity - Increase As Tolerated:   Time/Priority:  Routine (05-28-25 @ 05:18) [Active]  #ACP: DNR/DNI

## 2025-06-03 NOTE — DISCHARGE NOTE NURSING/CASE MANAGEMENT/SOCIAL WORK - PATIENT PORTAL LINK FT
You can access the FollowMyHealth Patient Portal offered by  by registering at the following website: [email or web address]. By joining [employer]’s FollowMyHealth portal, you will also be able to view your health information using other applications (apps) compatible with our system.

## 2025-06-03 NOTE — PROGRESS NOTE ADULT - ASSESSMENT
89y.o. Female with GI bleed  Acute blood loss anemia    5/28 black vomiting    Initial Hgb 10.5  s/p total of 5u pRBC  5/30 AM Hgb 7.8     Plan:  - Case d/w son Oliverio at [phone] on 5/25  - Initially planned for upper gastrointestinal endoscopy on 5/29, however, given scant NGT output and no further bleeding will defer as EGD will require general anesthesia  - Trend CBC, transfuse PRN   - Monitor stools for signs of GI bleeding   - Avoid NSAIDs   - IV PPI  - Hold A/C and asa; may need to hold indefinitely   - Advanced diet to pureed with mod thick liquids  - D/c planning

## 2025-06-03 NOTE — SOCIAL WORK PROGRESS NOTE - NSSWPROGRESSNOTE_GEN_ALL_CORE
JOHN spoke with pt's son Oliverio who wishes for pt to dc home at this time. Pt's son Oliverio reports he has private hire aides that will be able to assist pt at home as needed. CM made aware of pt's son Oliverio's request for dc home. JOHN will remain available as needed.

## 2025-06-03 NOTE — PROGRESS NOTE ADULT - SUBJECTIVE AND OBJECTIVE BOX
Patient is a 89y old  Female who presents with a chief complaint of GIB (02 Jun 2025 10:44)      Subjective:  INTERVAL HPI/OVERNIGHT EVENTS: Patient seen and examined at bedside. No overnight events. initially planned for [facility], son prefers home. Pt diet advanced. tolerating well.     MEDICATIONS  (STANDING):  chlorhexidine 2% Cloths 1 Application(s) Topical <User Schedule>  pantoprazole  Injectable 40 milliGRAM(s) IV Push every 12 hours    MEDICATIONS  (PRN):  aluminum hydroxide/magnesium hydroxide/simethicone Suspension 30 milliLiter(s) Oral every 4 hours PRN Dyspepsia      Allergies    No Known Allergies    Intolerances        REVIEW OF SYSTEMS:  CONSTITUTIONAL: No fever or chills  HEENT:  No headache, no sore throat  RESPIRATORY: No cough, wheezing, or shortness of breath  CARDIOVASCULAR: No chest pain, palpitations  GASTROINTESTINAL: No abd pain, nausea, vomiting, or diarrhea  GENITOURINARY: No dysuria, frequency, or hematuria  NEUROLOGICAL: no focal weakness or dizziness  MUSCULOSKELETAL: no myalgias     Objective:  Vital Signs Last 24 Hrs  T(C): 37.1 (03 Jun 2025 05:20), Max: 37.2 (02 Jun 2025 20:23)  T(F): 98.8 (03 Jun 2025 05:20), Max: 99 (02 Jun 2025 20:23)  HR: 90 (03 Jun 2025 05:20) (90 - 100)  BP: 156/85 (03 Jun 2025 05:20) (132/72 - 156/85)  BP(mean): --  RR: 18 (03 Jun 2025 05:20) (18 - 18)  SpO2: 95% (03 Jun 2025 05:20) (95% - 95%)    Parameters below as of 03 Jun 2025 05:20  Patient On (Oxygen Delivery Method): room air    GEN: elderly, frail  HEAD:  Atraumatic, Normocephalic  EYES: EOMI,  conjunctiva and sclera clear  ENT: moist mucous membranes  CHEST/LUNG: Clear to auscultation bilaterally; No rales, rhonchi, wheezing, or rubs. Unlabored respirations  HEART: Regular rate and rhythm; No murmurs, rubs, or gallops  ABDOMEN: Bowel sounds present; Soft, Nontender, Nondistended.   EXTREMITIES:  2+ Peripheral Pulses, brisk capillary refill. No clubbing, cyanosis, or edema. upper extremity edema, scattered ecchymoses   NERVOUS SYSTEM:  Alert & Oriented X3, speech clear. No deficits   SKIN: warm, dry, pale      LABS:                        8.8    10.77 )-----------( 285      ( 03 Jun 2025 06:30 )             27.3     CBC Full  -  ( 03 Jun 2025 06:30 )  WBC Count : 10.77 K/uL  Hemoglobin : 8.8 g/dL  Hematocrit : 27.3 %  Platelet Count - Automated : 285 K/uL  Mean Cell Volume : 96.5 fl  Mean Cell Hemoglobin : 31.1 pg  Mean Cell Hemoglobin Concentration : 32.2 g/dL  Auto Neutrophil # : x  Auto Lymphocyte # : x  Auto Monocyte # : x  Auto Eosinophil # : x  Auto Basophil # : x  Auto Neutrophil % : x  Auto Lymphocyte % : x  Auto Monocyte % : x  Auto Eosinophil % : x  Auto Basophil % : x      Ca    8.3        02 Jun 2025 08:00        Urinalysis Basic - ( 02 Jun 2025 08:00 )    Color: x / Appearance: x / SG: x / pH: x  Gluc: 93 mg/dL / Ketone: x  / Bili: x / Urobili: x   Blood: x / Protein: x / Nitrite: x   Leuk Esterase: x / RBC: x / WBC x   Sq Epi: x / Non Sq Epi: x / Bacteria: x      CAPILLARY BLOOD GLUCOSE      POCT Blood Glucose.: 121 mg/dL (02 Jun 2025 11:49)        Culture - Blood (collected 05-28-25 @ 11:20)  Source: Blood Blood-Peripheral  Final Report (06-02-25 @ 16:01):    No growth at 5 days    Culture - Blood (collected 05-28-25 @ 11:00)  Source: Blood Blood-Peripheral  Final Report (06-02-25 @ 16:01):    No growth at 5 days        RADIOLOGY & ADDITIONAL TESTS:    Personally reviewed.     Consultant(s) Notes Reviewed:  [x] YES  [ ] NO

## 2025-06-03 NOTE — PROGRESS NOTE ADULT - SUBJECTIVE AND OBJECTIVE BOX
[facility]  Octavio Bone NP  [address] [place] [address]  [phone]      INTERVAL HPI/OVERNIGHT EVENTS:  Pt s/e with aid at bedside  Pt reports no further nausea or vomiting  Tolerating pureed diet    MEDICATIONS  (STANDING):  chlorhexidine 2% Cloths 1 Application(s) Topical <User Schedule>  pantoprazole  Injectable 40 milliGRAM(s) IV Push every 12 hours    MEDICATIONS  (PRN):  aluminum hydroxide/magnesium hydroxide/simethicone Suspension 30 milliLiter(s) Oral every 4 hours PRN Dyspepsia      Allergies  No Known Allergies    PHYSICAL EXAM:   Vital Signs:  Vital Signs Last 24 Hrs  T(C): 37.1 (2025 05:20), Max: 37.2 (2025 20:23)  T(F): 98.8 (2025 05:20), Max: 99 (2025 20:23)  HR: 90 (2025 05:20) (90 - 100)  BP: 156/85 (2025 05:20) (132/72 - 156/85)  BP(mean): --  RR: 18 (2025 05:20) (18 - 18)  SpO2: 95% (2025 05:20) (95% - 95%)    Parameters below as of 2025 05:20  Patient On (Oxygen Delivery Method): room air      Daily     Daily Weight in k.8 (2025 05:20)    GENERAL:  Appears stated age  HEENT:  NC/AT  CHEST:  Full & symmetric excursion  HEART:  Regular rhythm  ABDOMEN:  Soft, non-tender, non-distended  EXTEREMITIES:  no cyanosis  SKIN:  No rash  NEURO:  Alert      LABS:                        8.8    10.77 )-----------( 285      ( 2025 06:30 )             27.3     06-02    140  |  103  |  5[L]  ----------------------------<  93  3.3[L]   |  25  |  0.21[L]    Ca    8.3[L]      2025 08:00    TPro  5.4[L]  /  Alb  2.2[L]  /  TBili  0.6  /  DBili  x   /  AST  12[L]  /  ALT  11[L]  /  AlkPhos  57  06-01      Urinalysis Basic - ( 2025 08:00 )    Color: x / Appearance: x / SG: x / pH: x  Gluc: 93 mg/dL / Ketone: x  / Bili: x / Urobili: x   Blood: x / Protein: x / Nitrite: x   Leuk Esterase: x / RBC: x / WBC x   Sq Epi: x / Non Sq Epi: x / Bacteria: x

## 2025-06-03 NOTE — DISCHARGE NOTE NURSING/CASE MANAGEMENT/SOCIAL WORK - FINANCIAL ASSISTANCE
[facility] provides services at a reduced cost to those who are determined to be eligible through [facility]’s financial assistance program. Information regarding [facility]’s financial assistance program can be found by going to [email or web address] or by calling [phone].

## 2025-06-04 ENCOUNTER — TRANSCRIPTION ENCOUNTER (OUTPATIENT)
Age: 89
End: 2025-06-04

## 2025-06-04 VITALS
TEMPERATURE: 98 F | HEART RATE: 86 BPM | RESPIRATION RATE: 18 BRPM | OXYGEN SATURATION: 97 % | SYSTOLIC BLOOD PRESSURE: 104 MMHG | DIASTOLIC BLOOD PRESSURE: 58 MMHG

## 2025-06-04 LAB
ANION GAP SERPL CALC-SCNC: 6 MMOL/L — SIGNIFICANT CHANGE UP (ref 5–17)
BUN SERPL-MCNC: 8 MG/DL — SIGNIFICANT CHANGE UP (ref 7–23)
CALCIUM SERPL-MCNC: 8.2 MG/DL — LOW (ref 8.5–10.1)
CHLORIDE SERPL-SCNC: 104 MMOL/L — SIGNIFICANT CHANGE UP (ref 96–108)
CO2 SERPL-SCNC: 31 MMOL/L — SIGNIFICANT CHANGE UP (ref 22–31)
CREAT SERPL-MCNC: 0.4 MG/DL — LOW (ref 0.5–1.3)
EGFR: 95 ML/MIN/1.73M2 — SIGNIFICANT CHANGE UP
EGFR: 95 ML/MIN/1.73M2 — SIGNIFICANT CHANGE UP
GLUCOSE SERPL-MCNC: 150 MG/DL — HIGH (ref 70–99)
HCT VFR BLD CALC: 27.4 % — LOW (ref 34.5–45)
HGB BLD-MCNC: 8.7 G/DL — LOW (ref 11.5–15.5)
MCHC RBC-ENTMCNC: 30.9 PG — SIGNIFICANT CHANGE UP (ref 27–34)
MCHC RBC-ENTMCNC: 31.8 G/DL — LOW (ref 32–36)
MCV RBC AUTO: 97.2 FL — SIGNIFICANT CHANGE UP (ref 80–100)
NRBC BLD AUTO-RTO: 0 /100 WBCS — SIGNIFICANT CHANGE UP (ref 0–0)
PLATELET # BLD AUTO: 324 K/UL — SIGNIFICANT CHANGE UP (ref 150–400)
POTASSIUM SERPL-MCNC: 3.8 MMOL/L — SIGNIFICANT CHANGE UP (ref 3.5–5.3)
POTASSIUM SERPL-SCNC: 3.8 MMOL/L — SIGNIFICANT CHANGE UP (ref 3.5–5.3)
RBC # BLD: 2.82 M/UL — LOW (ref 3.8–5.2)
RBC # FLD: 18 % — HIGH (ref 10.3–14.5)
SODIUM SERPL-SCNC: 141 MMOL/L — SIGNIFICANT CHANGE UP (ref 135–145)
WBC # BLD: 9.53 K/UL — SIGNIFICANT CHANGE UP (ref 3.8–10.5)
WBC # FLD AUTO: 9.53 K/UL — SIGNIFICANT CHANGE UP (ref 3.8–10.5)

## 2025-06-04 PROCEDURE — 85014 HEMATOCRIT: CPT

## 2025-06-04 PROCEDURE — 85018 HEMOGLOBIN: CPT

## 2025-06-04 PROCEDURE — 87641 MR-STAPH DNA AMP PROBE: CPT

## 2025-06-04 PROCEDURE — P9073: CPT

## 2025-06-04 PROCEDURE — 85027 COMPLETE CBC AUTOMATED: CPT

## 2025-06-04 PROCEDURE — 86901 BLOOD TYPING SEROLOGIC RH(D): CPT

## 2025-06-04 PROCEDURE — 74177 CT ABD & PELVIS W/CONTRAST: CPT

## 2025-06-04 PROCEDURE — P9016: CPT

## 2025-06-04 PROCEDURE — 80048 BASIC METABOLIC PNL TOTAL CA: CPT

## 2025-06-04 PROCEDURE — 86850 RBC ANTIBODY SCREEN: CPT

## 2025-06-04 PROCEDURE — 71045 X-RAY EXAM CHEST 1 VIEW: CPT

## 2025-06-04 PROCEDURE — 0241U: CPT

## 2025-06-04 PROCEDURE — 92610 EVALUATE SWALLOWING FUNCTION: CPT

## 2025-06-04 PROCEDURE — 85025 COMPLETE CBC W/AUTO DIFF WBC: CPT

## 2025-06-04 PROCEDURE — 84100 ASSAY OF PHOSPHORUS: CPT

## 2025-06-04 PROCEDURE — 99239 HOSP IP/OBS DSCHRG MGMT >30: CPT

## 2025-06-04 PROCEDURE — 97116 GAIT TRAINING THERAPY: CPT

## 2025-06-04 PROCEDURE — 97162 PT EVAL MOD COMPLEX 30 MIN: CPT

## 2025-06-04 PROCEDURE — 84145 PROCALCITONIN (PCT): CPT

## 2025-06-04 PROCEDURE — 87637 SARSCOV2&INF A&B&RSV AMP PRB: CPT

## 2025-06-04 PROCEDURE — 85610 PROTHROMBIN TIME: CPT

## 2025-06-04 PROCEDURE — 83735 ASSAY OF MAGNESIUM: CPT

## 2025-06-04 PROCEDURE — 74230 X-RAY XM SWLNG FUNCJ C+: CPT

## 2025-06-04 PROCEDURE — 80053 COMPREHEN METABOLIC PANEL: CPT

## 2025-06-04 PROCEDURE — 83036 HEMOGLOBIN GLYCOSYLATED A1C: CPT

## 2025-06-04 PROCEDURE — 93306 TTE W/DOPPLER COMPLETE: CPT

## 2025-06-04 PROCEDURE — 87640 STAPH A DNA AMP PROBE: CPT

## 2025-06-04 PROCEDURE — A9698: CPT

## 2025-06-04 PROCEDURE — 71260 CT THORAX DX C+: CPT

## 2025-06-04 PROCEDURE — 36430 TRANSFUSION BLD/BLD COMPNT: CPT

## 2025-06-04 PROCEDURE — 83605 ASSAY OF LACTIC ACID: CPT

## 2025-06-04 PROCEDURE — 92611 MOTION FLUOROSCOPY/SWALLOW: CPT

## 2025-06-04 PROCEDURE — 99285 EMERGENCY DEPT VISIT HI MDM: CPT

## 2025-06-04 PROCEDURE — 85730 THROMBOPLASTIN TIME PARTIAL: CPT

## 2025-06-04 PROCEDURE — 96374 THER/PROPH/DIAG INJ IV PUSH: CPT

## 2025-06-04 PROCEDURE — 74230 X-RAY XM SWLNG FUNCJ C+: CPT | Mod: 26

## 2025-06-04 PROCEDURE — 97530 THERAPEUTIC ACTIVITIES: CPT

## 2025-06-04 PROCEDURE — 86900 BLOOD TYPING SEROLOGIC ABO: CPT

## 2025-06-04 PROCEDURE — 87040 BLOOD CULTURE FOR BACTERIA: CPT

## 2025-06-04 PROCEDURE — 96375 TX/PRO/DX INJ NEW DRUG ADDON: CPT

## 2025-06-04 PROCEDURE — 86923 COMPATIBILITY TEST ELECTRIC: CPT

## 2025-06-04 PROCEDURE — 93005 ELECTROCARDIOGRAM TRACING: CPT

## 2025-06-04 PROCEDURE — 82962 GLUCOSE BLOOD TEST: CPT

## 2025-06-04 PROCEDURE — 36415 COLL VENOUS BLD VENIPUNCTURE: CPT

## 2025-06-04 RX ORDER — ASPIRIN 325 MG
1 TABLET ORAL
Refills: 0 | DISCHARGE

## 2025-06-04 RX ORDER — ACETAMINOPHEN 500 MG/5ML
1000 LIQUID (ML) ORAL
Refills: 0 | DISCHARGE

## 2025-06-04 RX ADMIN — Medication 600 MILLIGRAM(S): at 01:46

## 2025-06-04 RX ADMIN — Medication 40 MILLIGRAM(S): at 05:05

## 2025-06-04 RX ADMIN — Medication 240 MILLIGRAM(S): at 01:12

## 2025-06-04 RX ADMIN — Medication 1 APPLICATION(S): at 05:05

## 2025-06-04 NOTE — DISCHARGE NOTE PROVIDER - NSDCCPCAREPLAN_GEN_ALL_CORE_FT
PRINCIPAL DISCHARGE DIAGNOSIS  Diagnosis: Esophageal bleeding  Assessment and Plan of Treatment: You had GI bleeding requiring 5 units of blood transfusion and 1 platelets. Your family decided not to pursure EGD. You must avoid blood thinners and NSAIDS  STOP Aspirin  START Pantoprazole 40 mg twice per day   Follow up with your primary care provider in 1 week      SECONDARY DISCHARGE DIAGNOSES  Diagnosis: Dysphagia  Assessment and Plan of Treatment: You failed swallow evaluation. Recommended Puree with Thin liquids, as tolerated  You had MBS which showed ___   Follow up with your primary care provider in 1 week     PRINCIPAL DISCHARGE DIAGNOSIS  Diagnosis: Esophageal bleeding  Assessment and Plan of Treatment: You had GI bleeding requiring 5 units of blood transfusion and 1 platelets. Your family decided not to pursure EGD. You must avoid blood thinners and NSAIDS  STOP Aspirin  START Pantoprazole 40 mg twice per day   Follow up with your primary care provider in 1 week      SECONDARY DISCHARGE DIAGNOSES  Diagnosis: Dysphagia  Assessment and Plan of Treatment: You failed swallow evaluation. Recommended Puree with Thin liquids, as tolerated  You had MBS which showed soft bit size thin liquid diet.    Follow up with your primary care provider in 1 week

## 2025-06-04 NOTE — PROGRESS NOTE ADULT - NUTRITIONAL ASSESSMENT
This patient has been assessed with a concern for Malnutrition and has been determined to have a diagnosis/diagnoses of Moderate protein-calorie malnutrition and Underweight (BMI < 19).  
This patient has been assessed with a concern for Malnutrition and has been determined to have a diagnosis/diagnoses of Moderate protein-calorie malnutrition and Underweight (BMI < 19).    This patient is being managed with:   Diet Clear Liquid-  Entered: May 26 2025 10:08AM  
This patient has been assessed with a concern for Malnutrition and has been determined to have a diagnosis/diagnoses of Moderate protein-calorie malnutrition and Underweight (BMI < 19).    This patient is being managed with:   Diet Clear Liquid-  Moderately Thick Liquids (MODTHICKLIQS)  Entered: May 30 2025  2:05PM  
This patient has been assessed with a concern for Malnutrition and has been determined to have a diagnosis/diagnoses of Moderate protein-calorie malnutrition and Underweight (BMI < 19).    This patient is being managed with:   Diet NPO-  Entered: May 28 2025  5:18AM  
This patient has been assessed with a concern for Malnutrition and has been determined to have a diagnosis/diagnoses of Moderate protein-calorie malnutrition and Underweight (BMI < 19).    This patient is being managed with:   Diet Pureed-  Moderately Thick Liquids (MODTHICKLIQS)  Entered: Jun 2 2025 10:02AM  
This patient has been assessed with a concern for Malnutrition and has been determined to have a diagnosis/diagnoses of Moderate protein-calorie malnutrition and Underweight (BMI < 19).    This patient is being managed with:   Diet Soft and Bite Sized-  Entered: May 27 2025  9:09AM  
This patient has been assessed with a concern for Malnutrition and has been determined to have a diagnosis/diagnoses of Moderate protein-calorie malnutrition and Underweight (BMI < 19).    This patient is being managed with:   Diet NPO-  Entered: May 28 2025  5:18AM  
This patient has been assessed with a concern for Malnutrition and has been determined to have a diagnosis/diagnoses of Moderate protein-calorie malnutrition and Underweight (BMI < 19).    This patient is being managed with:   Diet Soft and Bite Sized-  Entered: May 27 2025  9:09AM  
This patient has been assessed with a concern for Malnutrition and has been determined to have a diagnosis/diagnoses of Moderate protein-calorie malnutrition and Underweight (BMI < 19).    This patient is being managed with:   Diet Clear Liquid-  Entered: May 26 2025 10:08AM  
This patient has been assessed with a concern for Malnutrition and has been determined to have a diagnosis/diagnoses of Moderate protein-calorie malnutrition and Underweight (BMI < 19).    This patient is being managed with:   Diet Clear Liquid-  Moderately Thick Liquids (MODTHICKLIQS)  Entered: May 30 2025  2:05PM

## 2025-06-04 NOTE — PROGRESS NOTE ADULT - ASSESSMENT
89y.o. Female with GI bleed  Acute blood loss anemia    5/28 black vomiting    Initial Hgb 10.5  s/p total of 5u pRBC  5/30 AM Hgb 7.8     Plan:  - Case d/w son Oliverio at [phone] on 5/25  - Initially planned for upper gastrointestinal endoscopy on 5/29, however, given scant NGT output and no further bleeding will defer as EGD will require general anesthesia  - Trend CBC, transfuse PRN   - Monitor stools for signs of GI bleeding   - Avoid NSAIDs   - IV PPI  - Hold A/C and asa; may need to hold indefinitely   - Follow MBS

## 2025-06-04 NOTE — SWALLOW VFSS/MBS ASSESSMENT ADULT - RECOMMENDED FEEDING/EATING TECHNIQUES
2-3 subsequent swallows per bite/sip/allow for swallow between intakes/alternate food with liquid/check mouth frequently for oral residue/pocketing/crush medication (when feasible)/maintain upright posture during/after eating for 30 mins/oral hygiene/position upright (90 degrees)/small sips/bites

## 2025-06-04 NOTE — SWALLOW VFSS/MBS ASSESSMENT ADULT - PHARYNGEAL PHASE COMMENTS
Stasis reduced to mild after liquid wash Stasis reduced with subsequent swallows.  SpO2 95%. All stasis from food consistencies reduced to mild after liquid wash. Stasis with liquids reduced to trace-mild with subsequent swallow.

## 2025-06-04 NOTE — PROGRESS NOTE ADULT - NSPROGADDITIONALINFOA_GEN_ALL_CORE
I reviewed the overnight course of events on the unit, re-confirming the patient history. I discussed the care with the patient and their family  The plan of care was discussed with the physician assistant and modifications were made to the notation where appropriate.   Differential diagnosis and plan of care discussed with patient after the evaluation  35 minutes spent on total encounter of which more than fifty percent of the encounter was spent counseling and/or coordinating care by the attending physician.  Advanced care planning was discussed with patient and family.  Advanced care planning forms were reviewed and discussed.  Risks, benefits and alternatives of gastroenterologic procedures were discussed in detail and all questions were answered.
I reviewed the overnight course of events on the unit, re-confirming the patient history. I discussed the care with the patient and their family  The plan of care was discussed with the nurse practitioner and modifications were made to the notation where appropriate.   Differential diagnosis and plan of care discussed with patient after the evaluation  35 minutes spent on total encounter of which more than fifty percent of the encounter was spent counseling and/or coordinating care by the attending physician.  Advanced care planning was discussed with patient and family.  Advanced care planning forms were reviewed and discussed.  Risks, benefits and alternatives of gastroenterologic procedures were discussed in detail and all questions were answered.
I reviewed the overnight course of events on the unit, re-confirming the patient history. I discussed the care with the patient and their family  The plan of care was discussed with the physician assistant and modifications were made to the notation where appropriate.   Differential diagnosis and plan of care discussed with patient after the evaluation  35 minutes spent on total encounter of which more than fifty percent of the encounter was spent counseling and/or coordinating care by the attending physician.  Advanced care planning was discussed with patient and family.  Advanced care planning forms were reviewed and discussed.  Risks, benefits and alternatives of gastroenterologic procedures were discussed in detail and all questions were answered.
pt c/o extremity swelling and difficult blood draws- elevate b/l upper extremities as tolerated. d/w nurse
pt c/o extremity swelling and difficult blood draws- elevate b/l upper extremities as tolerated. d/w nurse    6/1- son updated over phone
I reviewed the overnight course of events on the unit, re-confirming the patient history. I discussed the care with the patient and their family  The plan of care was discussed with the physician assistant and modifications were made to the notation where appropriate.   Differential diagnosis and plan of care discussed with patient after the evaluation  35 minutes spent on total encounter of which more than fifty percent of the encounter was spent counseling and/or coordinating care by the attending physician.  Advanced care planning was discussed with patient and family.  Advanced care planning forms were reviewed and discussed.  Risks, benefits and alternatives of gastroenterologic procedures were discussed in detail and all questions were answered.

## 2025-06-04 NOTE — DISCHARGE NOTE PROVIDER - DETAILS OF MALNUTRITION DIAGNOSIS/DIAGNOSES
This patient has been assessed with a concern for Malnutrition and was treated during this hospitalization for the following Nutrition diagnosis/diagnoses:     -  05/26/2025: Moderate protein-calorie malnutrition   -  05/26/2025: Underweight (BMI < 19)

## 2025-06-04 NOTE — DISCHARGE NOTE PROVIDER - PROVIDER TOKENS
Problem: At Risk for Falls  Goal: # Takes action to control fall-related risks  Patient refuses bed/ chair alarm, belongings within reach, call light within reach, frequent checks       FREE:[LAST:[Elizabeth],PHONE:[(   )    -],FAX:[(   )    -],FOLLOWUP:[1 week],ESTABLISHEDPATIENT:[T]]

## 2025-06-04 NOTE — CASE MANAGEMENT PROGRESS NOTE - NSCMPROGRESSNOTE_GEN_ALL_CORE
CM spoke to S/S department who stated they will evaluate the patient today.  CM spoke to S/S department who stated they will evaluate the patient this morning.

## 2025-06-04 NOTE — CAREGIVER ENGAGEMENT NOTE - CAREGIVER EDUCATION - TYPES DISCUSSED
Discharge plan/Home Care Services/Transportation coordination
Discharge plan/Home Care Services
Discharge plan

## 2025-06-04 NOTE — SWALLOW VFSS/MBS ASSESSMENT ADULT - ADDITIONAL RECOMMENDATIONS
1) Consider GOC conversation with pt/family regarding nutrition/hydration, as pt requesting regular diet   2) Dysphagia tx

## 2025-06-04 NOTE — SWALLOW BEDSIDE ASSESSMENT ADULT - SLP GENERAL OBSERVATIONS
Pt received upright in bed A&A Ox3, on RA, private aide present at end of evaluation, pain scale 0/10 pre & post eval.

## 2025-06-04 NOTE — SWALLOW VFSS/MBS ASSESSMENT ADULT - ROSENBEK'S PENETRATION ASPIRATION SCALE
(1) no aspiration, contrast does not enter airway ; 5=contrast contacts vocal cords visible residue remains (penetration)/(3) contrast remains above the vocal cords, visible residue remains (penetration)

## 2025-06-04 NOTE — PROGRESS NOTE ADULT - SUBJECTIVE AND OBJECTIVE BOX
Patient is a 89y old  Female who presents with a chief complaint of GIB (03 Jun 2025 10:57)      INTERVAL HPI/OVERNIGHT EVENTS: No acute overnight events. Pt was seen and examined at bedside. Pt states that  Pt denies headache, dizziness, lightheadedness, fever, chills, body aches, CP, SOB, palpitations, abdominal pain, n/v, numbness/tingling.  No other complaints at this time.     MEDICATIONS  (STANDING):  chlorhexidine 2% Cloths 1 Application(s) Topical <User Schedule>  pantoprazole  Injectable 40 milliGRAM(s) IV Push every 12 hours    MEDICATIONS  (PRN):  aluminum hydroxide/magnesium hydroxide/simethicone Suspension 30 milliLiter(s) Oral every 4 hours PRN Dyspepsia      Allergies    No Known Allergies    Intolerances        REVIEW OF SYSTEMS:  CONSTITUTIONAL: No fever or chills  HEENT:  No headache, no sore throat  RESPIRATORY: No cough, wheezing, or shortness of breath  CARDIOVASCULAR: No chest pain, palpitations  GASTROINTESTINAL: No abd pain, nausea, vomiting, or diarrhea  GENITOURINARY: No dysuria, frequency, or hematuria  NEUROLOGICAL: no focal weakness or dizziness  MUSCULOSKELETAL: no myalgias     Vital Signs Last 24 Hrs  T(C): 36.9 (04 Jun 2025 05:22), Max: 37.1 (03 Jun 2025 19:32)  T(F): 98.4 (04 Jun 2025 05:22), Max: 98.7 (03 Jun 2025 19:32)  HR: 80 (04 Jun 2025 05:22) (80 - 91)  BP: 136/79 (04 Jun 2025 05:22) (97/60 - 136/79)  BP(mean): --  RR: 18 (04 Jun 2025 05:22) (18 - 18)  SpO2: 94% (04 Jun 2025 05:22) (91% - 94%)    Parameters below as of 04 Jun 2025 05:22  Patient On (Oxygen Delivery Method): room air        PHYSICAL EXAM:  GENERAL: NAD  HEENT:  anicteric, moist mucous membranes  CHEST/LUNG:  CTA b/l, no rales, wheezes, or rhonchi  HEART:  RRR, S1, S2  ABDOMEN:  BS+, soft, nontender, nondistended  EXTREMITIES: no edema, cyanosis, or calf tenderness  NERVOUS SYSTEM: answers questions and follows commands appropriately    LABS:    CBC Full  -  ( 03 Jun 2025 06:30 )  WBC Count : 10.77 K/uL  Hemoglobin : 8.8 g/dL  Hematocrit : 27.3 %  Platelet Count - Automated : 285 K/uL  Mean Cell Volume : 96.5 fl  Mean Cell Hemoglobin : 31.1 pg  Mean Cell Hemoglobin Concentration : 32.2 g/dL  Auto Neutrophil # : x  Auto Lymphocyte # : x  Auto Monocyte # : x  Auto Eosinophil # : x  Auto Basophil # : x  Auto Neutrophil % : x  Auto Lymphocyte % : x  Auto Monocyte % : x  Auto Eosinophil % : x  Auto Basophil % : x              CAPILLARY BLOOD GLUCOSE            Culture - Blood (collected 05-28-25 @ 11:20)  Source: Blood Blood-Peripheral  Final Report (06-02-25 @ 16:01):    No growth at 5 days    Culture - Blood (collected 05-28-25 @ 11:00)  Source: Blood Blood-Peripheral  Final Report (06-02-25 @ 16:01):    No growth at 5 days        RADIOLOGY & ADDITIONAL TESTS: ____    Personally reviewed.     Consultant(s) Notes Reviewed:  [x] YES  [ ] NO     Patient is a 89y old  Female who presents with a chief complaint of GIB (03 Jun 2025 10:57)      INTERVAL HPI/OVERNIGHT EVENTS: No acute overnight events. Pt was seen and examined at bedside. Pt denies headache, dizziness, lightheadedness, fever, chills, body aches, CP, SOB, palpitations, abdominal pain, n/v, numbness/tingling.  No other complaints at this time.     MEDICATIONS  (STANDING):  chlorhexidine 2% Cloths 1 Application(s) Topical <User Schedule>  pantoprazole  Injectable 40 milliGRAM(s) IV Push every 12 hours    MEDICATIONS  (PRN):  aluminum hydroxide/magnesium hydroxide/simethicone Suspension 30 milliLiter(s) Oral every 4 hours PRN Dyspepsia      Allergies    No Known Allergies    Intolerances        REVIEW OF SYSTEMS:  CONSTITUTIONAL: No fever or chills  HEENT:  No headache, no sore throat  RESPIRATORY: No cough, wheezing, or shortness of breath  CARDIOVASCULAR: No chest pain, palpitations  GASTROINTESTINAL: No abd pain, nausea, vomiting, or diarrhea  GENITOURINARY: No dysuria, frequency, or hematuria  NEUROLOGICAL: no focal weakness or dizziness  MUSCULOSKELETAL: no myalgias     Vital Signs Last 24 Hrs  T(C): 36.9 (04 Jun 2025 05:22), Max: 37.1 (03 Jun 2025 19:32)  T(F): 98.4 (04 Jun 2025 05:22), Max: 98.7 (03 Jun 2025 19:32)  HR: 80 (04 Jun 2025 05:22) (80 - 91)  BP: 136/79 (04 Jun 2025 05:22) (97/60 - 136/79)  BP(mean): --  RR: 18 (04 Jun 2025 05:22) (18 - 18)  SpO2: 94% (04 Jun 2025 05:22) (91% - 94%)    Parameters below as of 04 Jun 2025 05:22  Patient On (Oxygen Delivery Method): room air        PHYSICAL EXAM:  GENERAL: NAD  HEENT:  anicteric, moist mucous membranes  CHEST/LUNG:  CTA b/l, no rales, wheezes, or rhonchi  HEART:  RRR, S1, S2  ABDOMEN:  BS+, soft, nontender, nondistended  EXTREMITIES: no edema, cyanosis, or calf tenderness  NERVOUS SYSTEM: answers questions and follows commands appropriately    LABS:    CBC Full  -  ( 03 Jun 2025 06:30 )  WBC Count : 10.77 K/uL  Hemoglobin : 8.8 g/dL  Hematocrit : 27.3 %  Platelet Count - Automated : 285 K/uL  Mean Cell Volume : 96.5 fl  Mean Cell Hemoglobin : 31.1 pg  Mean Cell Hemoglobin Concentration : 32.2 g/dL  Auto Neutrophil # : x  Auto Lymphocyte # : x  Auto Monocyte # : x  Auto Eosinophil # : x  Auto Basophil # : x  Auto Neutrophil % : x  Auto Lymphocyte % : x  Auto Monocyte % : x  Auto Eosinophil % : x  Auto Basophil % : x              CAPILLARY BLOOD GLUCOSE            Culture - Blood (collected 05-28-25 @ 11:20)  Source: Blood Blood-Peripheral  Final Report (06-02-25 @ 16:01):    No growth at 5 days    Culture - Blood (collected 05-28-25 @ 11:00)  Source: Blood Blood-Peripheral  Final Report (06-02-25 @ 16:01):    No growth at 5 days        RADIOLOGY & ADDITIONAL TESTS: ____    Personally reviewed.     Consultant(s) Notes Reviewed:  [x] YES  [ ] NO

## 2025-06-04 NOTE — DISCHARGE NOTE PROVIDER - NSDCMRMEDTOKEN_GEN_ALL_CORE_FT
acetaminophen: 1,000 milligram(s) 2 times a day  aspirin 81 mg oral capsule: 1 cap(s) orally   pantoprazole 40 mg oral delayed release tablet: 1 tab(s) orally 2 times a day

## 2025-06-04 NOTE — CAREGIVER ENGAGEMENT NOTE - CAREGIVER OUTREACH NOTES - FREE TEXT
JOHN made CM aware that the patient's son is declining [facility]. CM spoke to the patient's son Oliverio over the phone who confirmed the patient has a RW, wheelchair, hospital bed, cane, and commode PTA. Patient lives in a private home. 1 step to enter and then on the main level. Patient has a 24/7 private hire aide who is present at the bedside. CM discussed home care services for a visiting nurse/PT. Home care choice list provided. Patient is followed by [facility] and son is in agreement for CM to send a referral to [facility]. Referral sent to [facility] to review for acceptance. Patient's son requesting another s/s eval regarding diet. MD made aware and order for s/s re-eval noted. CM met with the patient and her aide at the bedside and discussed the stated above. Patient is also declining [facility] and prefers home with home care. Ambulance to be arranged. CM remains available.   
Per MD, patient is medically cleared for discharge home today. Referral to [facility] has been accepted for SOC 6/5/25. CM met with the patient and spoke to the patient's son Oliverio over the phone to discuss the transition plan as stated above. Oliverio is in agreement for CM to arrange transportation. Ambulance arranged for 4:30pm today. 24/7 private hire aide present at bedside. Patient and Oliverio verbalized understanding of the transition plan and are in agreement. IMM reviewed over the phone with Oliverio. Bedside RN aware of plan. CM remains available.

## 2025-06-04 NOTE — PROGRESS NOTE ADULT - TIME BILLING
Reviewing chart notes and data, face to face time counseling the patient, coordinating care
Reviewing chart notes and data, face to face time counseling the patient, coordinating care
Reviewing chart notes and data, face to face time counseling the patient, coordinating care with SW/CM and ICU team.
Reviewing chart notes and data, face to face time counseling the patient, coordinating care
pt seen and examine see above plan -  admitted with icu  Acute Blood Loss Anemia / Hypovolemic Shock  possible upper gi  esophageal  source  - pressure support   post multiple blood transfusion   stable transfer floor  not candidate for endoscopy   -   hb remain stable no further upper gi bleed    dc home  asa  . dc plan home.
Reviewing chart notes and data, face to face time counseling the patient, coordinating care
Reviewing chart notes and data, face to face time counseling the patient, coordinating care with SW/CM and ICU team.
I spent 55 minutes providing medical care for this patient. This includes reviewing labs, consultant notes, vital signs, ins and outs, medication list, any imaging obtained, examining and assessing patient, discussing with patient and/or HCP or representative of patient. This does not include any procedure related time.
I spent 55 minutes providing medical care for this patient. This includes reviewing labs, consultant notes, vital signs, ins and outs, medication list, any imaging obtained, examining and assessing patient, discussing with patient and/or HCP or representative of patient. This does not include any procedure related time.

## 2025-06-04 NOTE — DISCHARGE NOTE PROVIDER - HOSPITAL COURSE
HPI:  89-year-old female with past medical history of breast cancer with bilateral mastectomy presents today due to vomiting and diarrhea.  Patient is with aide at the bedside who reports numerous episodes of vomiting.  No prior abdominal surgery.  Patient has also been having intermittent small soft stools.  Patient takes Tylenol every morning.  No known fever currently.  Patient denies chest pain, shortness of breath, cough, known sick contacts, recent travel, recent antibiotics, or any other complaints.    ED COURSE:  Vitals: T 97.3  F , HR 91 , BP 77 /79 , RR 23 , SpO2  100% on 2LNC  Labs significant for: wbc 19.19, hgb 10.5, mcv 108.5, lactate 5.4, BUN 48  Imaging: CT chest and abdomen/pelvis: Foci of active GI bleeding in the lower esophagus/gastroesophageal junction, with intraluminal pooling of contrast on the delayed phase images. 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 Gastric distention with complex fluid and heterogeneous debris may represent blood products.  EKG: Line Sinus tachycardia with premature atrial complexes, LVH, qtc 470  Pt received: zofran 4 mg IVP, protonix 40 mg IVP, zosyn 3.375, phenylephrine gtt, NS 2750 cc   (25 May 2025 16:02)      ---  HOSPITAL COURSE:   89F Breast CA and HTN who was on Home Hospice admitted for Acute Blood Loss Anemia and Hypovolemic Shock. S/P Transfusion of 5u pRBC + 1U Platelets; Required Pressors on admission, titrated off, but restarted due to persistent hypotension. Now off vasopressors again on 5/30. Given continued melanotic BM's and dark vomitus holding off on any anti-htn medications currently. she was on PPI Infusion and monitor HgB. family did not want to pursue EGD given the requirement for General Anesthesia.   6/1- hemoglobin improved from prior. 6/3- family prefers d/c home. reeval swallowing to try to advance diet.   She had hypernatremia, improved.   Pt had dysphagia, failed speech and swallow eval. MBS _____      Patient is stable for discharge as per primary medical team and consultants.    PT consulted, recommends discharge ______    Patient showed improvement throughout hospitalization. Patient was seen and examined on day of discharge. Patient was medically optimized for discharge with close outpatient follow up.      PE on day of discharge    PHYSICAL EXAM:  GENERAL: NAD  HEENT:  anicteric, moist mucous membranes  CHEST/LUNG:  CTA b/l, no rales, wheezes, or rhonchi  HEART:  RRR, S1, S2  ABDOMEN:  BS+, soft, nontender, nondistended  EXTREMITIES: no edema, cyanosis, or calf tenderness  NERVOUS SYSTEM: answers questions and follows commands appropriately    ---  CONSULTANTS:     ---  TIME SPENT:  I, the attending physician, was physically present for the key portions of the evaluation and management (E/M) service provided. The total amount of time spent reviewing the hospital notes, laboratory values, imaging findings, assessing/counseling the patient, discussing with consultant physicians, social work, nursing staff was -- minutes    ---  Primary care provider was made aware of plan for discharge:      [  ] NO     [  ] YES   HPI:  89-year-old female with past medical history of breast cancer with bilateral mastectomy presents today due to vomiting and diarrhea.  Patient is with aide at the bedside who reports numerous episodes of vomiting.  No prior abdominal surgery.  Patient has also been having intermittent small soft stools.  Patient takes Tylenol every morning.  No known fever currently.  Patient denies chest pain, shortness of breath, cough, known sick contacts, recent travel, recent antibiotics, or any other complaints.    ED COURSE:  Vitals: T 97.3  F , HR 91 , BP 77 /79 , RR 23 , SpO2  100% on 2LNC  Labs significant for: wbc 19.19, hgb 10.5, mcv 108.5, lactate 5.4, BUN 48  Imaging: CT chest and abdomen/pelvis: Foci of active GI bleeding in the lower esophagus/gastroesophageal junction, with intraluminal pooling of contrast on the delayed phase images. 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 Gastric distention with complex fluid and heterogeneous debris may represent blood products.  EKG: Line Sinus tachycardia with premature atrial complexes, LVH, qtc 470  Pt received: zofran 4 mg IVP, protonix 40 mg IVP, zosyn 3.375, phenylephrine gtt, NS 2750 cc   (25 May 2025 16:02)      ---  HOSPITAL COURSE:   89F Breast CA and HTN who was on Home Hospice admitted for Acute Blood Loss Anemia and Hypovolemic Shock admitted to ICU. S/P Transfusion of 5u pRBC + 1U Platelets; Required Pressors on admission, titrated off, but restarted due to persistent hypotension. Now off vasopressors again on 5/30. Given continued melanotic BM's and dark vomitus holding off on any anti-htn medications currently. she was on PPI Infusion and monitor HgB. family did not want to pursue EGD given the requirement for General Anesthesia.   6/1- hemoglobin improved from prior. 6/3- family prefers d/c home. reeval swallowing to try to advance diet. Family insisted on regular food. Pt had dysphagia,  speech and swallow eval recommended Puree with Thin liquids, as tolerated. MBS _____  She had hypernatremia, improved.  plan to discharge home avoid aspirin, avoid NSAIDS. follow up with PCP      Patient is stable for discharge as per primary medical team and consultants.    PT consulted, recommends discharge ______    Patient showed improvement throughout hospitalization. Patient was seen and examined on day of discharge. Patient was medically optimized for discharge with close outpatient follow up.      PE on day of discharge    PHYSICAL EXAM:  GENERAL: NAD  HEENT:  anicteric, moist mucous membranes  CHEST/LUNG:  CTA b/l, no rales, wheezes, or rhonchi  HEART:  RRR, S1, S2  ABDOMEN:  BS+, soft, nontender, nondistended  EXTREMITIES: no edema, cyanosis, or calf tenderness  NERVOUS SYSTEM: answers questions and follows commands appropriately    ---  CONSULTANTS:     ---  TIME SPENT:  I, the attending physician, was physically present for the key portions of the evaluation and management (E/M) service provided. The total amount of time spent reviewing the hospital notes, laboratory values, imaging findings, assessing/counseling the patient, discussing with consultant physicians, social work, nursing staff was -- minutes    ---  Primary care provider was made aware of plan for discharge:      [  ] NO     [  ] YES   HPI:  89-year-old female with past medical history of breast cancer with bilateral mastectomy presents today due to vomiting and diarrhea.  Patient is with aide at the bedside who reports numerous episodes of vomiting.  No prior abdominal surgery.  Patient has also been having intermittent small soft stools.  Patient takes Tylenol every morning.  No known fever currently.  Patient denies chest pain, shortness of breath, cough, known sick contacts, recent travel, recent antibiotics, or any other complaints.    ED COURSE:  Vitals: T 97.3  F , HR 91 , BP 77 /79 , RR 23 , SpO2  100% on 2LNC  Labs significant for: wbc 19.19, hgb 10.5, mcv 108.5, lactate 5.4, BUN 48  Imaging: CT chest and abdomen/pelvis: Foci of active GI bleeding in the lower esophagus/gastroesophageal junction, with intraluminal pooling of contrast on the delayed phase images. 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 Gastric distention with complex fluid and heterogeneous debris may represent blood products.  EKG: Line Sinus tachycardia with premature atrial complexes, LVH, qtc 470  Pt received: zofran 4 mg IVP, protonix 40 mg IVP, zosyn 3.375, phenylephrine gtt, NS 2750 cc   (25 May 2025 16:02)      ---  HOSPITAL COURSE:   89F Breast CA and HTN  admitted for Acute Blood Loss Anemia   and Hypovolemic Shock    sec to upper gi bleed possible esophageal source   admitted to ICU. S/P Transfusion of 5u pRBC + 1U Platelets; Required Pressors on admission, titrated off, but restarted due to persistent hypotension. Now off vasopressors again on 5/30. Given continued melanotic BM's and dark vomitus holding off on any anti-htn medications currently. she was on PPI Infusion and monitor HgB. family did not want to pursue EGD given the requirement for General Anesthesia.   hemoglobin improved from prior   remain stable  .   swallowing to try to advance diet. Family insisted on regular food. Pt  notice to have  dysphagia,  speech and swallow eval recommended Puree with Thin liquids, as tolerated. MBS  done recommended  soft bit size thin liquid.   She had hypernatremia, improved .  plan to discharge home avoid aspirin, avoid NSAIDS. follow up with PCP    PT consulted, recommends discharge  home pt.     Patient showed improvement throughout hospitalization. Patient was seen and examined on day of discharge. Patient was medically optimized for discharge with close outpatient follow up.  son rosa hood dc  tt plan .         ---  TIME SPENT:  I, the attending physician, was physically present for the key portions of the evaluation and management (E/M) service provided. The total amount of time spent reviewing the hospital notes, laboratory values, imaging findings, assessing/counseling the patient, discussing with consultant physicians, social work, nursing staff was 45- minutes       HPI:  89-year-old female with past medical history of breast cancer with bilateral mastectomy presents today due to vomiting and diarrhea.  Patient is with aide at the bedside who reports numerous episodes of vomiting.  No prior abdominal surgery.  Patient has also been having intermittent small soft stools.  Patient takes Tylenol every morning.  No known fever currently.  Patient denies chest pain, shortness of breath, cough, known sick contacts, recent travel, recent antibiotics, or any other complaints.    ED COURSE:  Vitals: T 97.3  F , HR 91 , BP 77 /79 , RR 23 , SpO2  100% on 2LNC  Labs significant for: wbc 19.19, hgb 10.5, mcv 108.5, lactate 5.4, BUN 48  Imaging: CT chest and abdomen/pelvis: Foci of active GI bleeding in the lower esophagus/gastroesophageal junction, with intraluminal pooling of contrast on the delayed phase images. 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 Gastric distention with complex fluid and heterogeneous debris may represent blood products.  EKG: Line Sinus tachycardia with premature atrial complexes, LVH, qtc 470  Pt received: zofran 4 mg IVP, protonix 40 mg IVP, zosyn 3.375, phenylephrine gtt, NS 2750 cc   (25 May 2025 16:02)      ---  HOSPITAL COURSE:   89F Breast CA and HTN  admitted for Acute Blood Loss Anemia   and Hypovolemic Shock    sec to upper gi bleed possible esophageal source   admitted to ICU. S/P Transfusion of 5u pRBC + 1U Platelets; Required Pressors on admission, titrated off, but restarted due to persistent hypotension. Now off vasopressors again on 5/30. Given continued melanotic BM's and dark vomitus holding off on any anti-htn medications currently. she was on PPI Infusion and monitor HgB. family did not want to pursue EGD given the requirement for General Anesthesia.   hemoglobin improved from prior   remain stable  .   swallowing to try to advance diet. Family insisted on regular food. Pt  notice to have  dysphagia,  speech and swallow eval recommended Puree with Thin liquids, as tolerated. MBS  done recommended  soft bit size thin liquid.   She had hypernatremia sec to    dehydration   improved -with iv fluid.   .  plan to discharge home avoid aspirin, avoid NSAIDS. follow up with PCP    PT consulted, recommends discharge  home pt.     Patient showed improvement throughout hospitalization. Patient was seen and examined on day of discharge. Patient was medically optimized for discharge with close outpatient follow up.  son rosa aware dc  tt plan .         ---  TIME SPENT:  I, the attending physician, was physically present for the key portions of the evaluation and management (E/M) service provided. The total amount of time spent reviewing the hospital notes, laboratory values, imaging findings, assessing/counseling the patient, discussing with consultant physicians, social work, nursing staff was 45- minutes

## 2025-06-04 NOTE — PROGRESS NOTE ADULT - SUBJECTIVE AND OBJECTIVE BOX
[facility]  Octavio Bone NP  [address] [place] [address]  [phone]      INTERVAL HPI/OVERNIGHT EVENTS:  Pt s/e  Hgb 8.8  Swallow eval noted, recommended MBS    MEDICATIONS  (STANDING):  chlorhexidine 2% Cloths 1 Application(s) Topical <User Schedule>  pantoprazole  Injectable 40 milliGRAM(s) IV Push every 12 hours    MEDICATIONS  (PRN):  aluminum hydroxide/magnesium hydroxide/simethicone Suspension 30 milliLiter(s) Oral every 4 hours PRN Dyspepsia      Allergies  No Known Allergies    PHYSICAL EXAM:   Vital Signs:  Vital Signs Last 24 Hrs  T(C): 36.8 (2025 11:55), Max: 37.1 (2025 19:32)  T(F): 98.3 (2025 11:55), Max: 98.7 (2025 19:32)  HR: 86 (2025 11:55) (80 - 91)  BP: 104/58 (2025 11:55) (97/60 - 136/79)  BP(mean): --  RR: 18 (2025 11:55) (18 - 18)  SpO2: 97% (2025 11:55) (92% - 97%)    Parameters below as of 2025 11:55  Patient On (Oxygen Delivery Method): room air      Daily     Daily Weight in k.3 (2025 05:22)    GENERAL:  Appears stated age  HEENT:  NC/AT  CHEST:  Full & symmetric excursion  HEART:  Regular rhythm  ABDOMEN:  Soft, non-tender, non-distended  EXTEREMITIES:  no cyanosis  SKIN:  No rash  NEURO:  Alert      LABS:                        8.7    9.53  )-----------( 324      ( 2025 08:30 )             27.4     06-04    141  |  104  |  8   ----------------------------<  150[H]  3.8   |  31  |  0.40[L]    Ca    8.2[L]      2025 08:30        Urinalysis Basic - ( 2025 08:30 )    Color: x / Appearance: x / SG: x / pH: x  Gluc: 150 mg/dL / Ketone: x  / Bili: x / Urobili: x   Blood: x / Protein: x / Nitrite: x   Leuk Esterase: x / RBC: x / WBC x   Sq Epi: x / Non Sq Epi: x / Bacteria: x

## 2025-06-04 NOTE — SWALLOW BEDSIDE ASSESSMENT ADULT - COMMENTS
Chart reviewed order received for swallow eval.  Pt cleared for solids and thin liquids by GI PA.  Pt received upright in bed A&A Ox3, on RA, private aide present at end of evaluation, pain scale 0/10 pre & post eval.  Swallow eval completed see below for details.  Pt educated on rx's, verbalized understanding, pt left as received NAD JAROD Haq, Dr. Dave notified and in agreement for MBS, to be scheduled for this morning.  Will follow.     Per charting, pt is a "89-year-old female with past medical history of breast cancer with bilateral mastectomy presents today due to vomiting and diarrhea.  Patient is with aide at the bedside who reports numerous episodes of vomiting.  No prior abdominal surgery.  Patient has also been having intermittent small soft stools.  Patient takes Tylenol every morning.  No known fever currently.  Patient denies chest pain, shortness of breath, cough, known sick contacts, recent travel, recent antibiotics, or any other complaints."    CT Chest & Abdomen 5/25/25: "IMPRESSION:  Foci of active GI bleeding in the lower esophagus/gastroesophageal   junction, with intraluminal pooling of contrast on the delayed phase   images.    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 Gastric distention with   complex fluid and heterogeneous debris may represent blood products.    Findings were discussed with Dr. Mccallum on 5/25/2025 1:14 PM by Dr. Partida with read back confirmation."

## 2025-06-04 NOTE — PROGRESS NOTE ADULT - PROVIDER SPECIALTY LIST ADULT
Critical Care
Gastroenterology
Gastroenterology
Hospitalist
Hospitalist
Critical Care
Gastroenterology
Gastroenterology
Hospitalist
Gastroenterology
Gastroenterology
Hospitalist
Hospitalist
Critical Care
Gastroenterology
Gastroenterology
Hospitalist
Critical Care
Gastroenterology
Hospitalist
Gastroenterology
Hospitalist

## 2025-06-04 NOTE — SWALLOW VFSS/MBS ASSESSMENT ADULT - DIAGNOSTIC IMPRESSIONS
1. Mild oral dysphagia with puree, soft & bite sized, regular solid, moderately thick, mildly thick and thin liquids marked by reduced lingual coordination resulting in posterior loss to the oropharynx and piecemeal deglutition.    2. Mild-moderate pharyngeal dysphagia with puree, soft & bite sized, regular solid, moderately thick and mildly thick liquids marked by reduced to absent epiglottic deflection, reduced tongue base retraction, reduced hyolaryngeal excursion, +mild-moderate stasis in the valleculae and pyriform sinuses with puree, soft & bite sized, moderately thick and mildly thick liquids, +moderate stasis in the valleculae and mild-moderate stasis in the pyriform sinuses with regular solid.  All stasis from food consistencies reduced to mild after liquid wash. Stasis with liquids reduced to trace-mild with subsequent swallow.  No penetration, no aspiration observed.   3. Moderate pharyngeal dysphagia with thin liquid marked by reduced to absent epiglottic deflection, reduced tongue base retraction, reduced hyolaryngeal excursion, reduced laryngeal elevation, reduced airway closure, +mild stasis in the valleculae, mild-moderate stasis in the pyriform sinuses, stasis reduced with subsequent swallows.  +Penetration above and contacting the vocal cords during and after the swallow, pt cued to cough, unable to clear, SpO2 95%.  Pt was unable to complete postures despite cues.

## 2025-06-04 NOTE — SWALLOW BEDSIDE ASSESSMENT ADULT - SWALLOW EVAL: DIAGNOSIS
Oral stage notable for suspected posterior loss with puree, soft & bite sized, moderately thick and thin liquids.  Pharyngeal stage deemed functional for puree, moderately thick and thin liquids.  Pt with c/o pharyngeal stasis with soft & bite sized, did not improve after liquid trials.  Rx MBS for objective view of pharyngeal stage for further assessment.  Discussed with Dr. Tenzin MD in agreement, will follow for MBS today.

## 2025-06-05 ENCOUNTER — NON-APPOINTMENT (OUTPATIENT)
Age: 89
End: 2025-06-05

## 2025-06-05 RX ORDER — PANTOPRAZOLE SODIUM 40 MG/1
40 TABLET, DELAYED RELEASE ORAL
Qty: 1 | Refills: 0 | Status: ACTIVE | COMMUNITY
Start: 2025-06-05

## 2025-06-09 ENCOUNTER — APPOINTMENT (OUTPATIENT)
Dept: HOME HEALTH SERVICES | Facility: HOME HEALTH | Age: 89
End: 2025-06-09
Payer: MEDICARE

## 2025-06-09 VITALS
RESPIRATION RATE: 17 BRPM | HEART RATE: 91 BPM | TEMPERATURE: 99.7 F | SYSTOLIC BLOOD PRESSURE: 118 MMHG | DIASTOLIC BLOOD PRESSURE: 58 MMHG | OXYGEN SATURATION: 99 %

## 2025-06-09 PROBLEM — E87.1 HYPONATREMIA: Status: RESOLVED | Noted: 2025-06-09 | Resolved: 2025-06-09

## 2025-06-09 PROCEDURE — 99495 TRANSJ CARE MGMT MOD F2F 14D: CPT

## 2025-06-12 ENCOUNTER — LABORATORY RESULT (OUTPATIENT)
Age: 89
End: 2025-06-12

## 2025-06-13 ENCOUNTER — APPOINTMENT (OUTPATIENT)
Dept: HOME HEALTH SERVICES | Facility: HOME HEALTH | Age: 89
End: 2025-06-13

## 2025-06-13 VITALS
TEMPERATURE: 97.5 F | HEART RATE: 91 BPM | OXYGEN SATURATION: 98 % | DIASTOLIC BLOOD PRESSURE: 58 MMHG | RESPIRATION RATE: 18 BRPM | SYSTOLIC BLOOD PRESSURE: 100 MMHG

## 2025-06-13 PROCEDURE — 99350 HOME/RES VST EST HIGH MDM 60: CPT | Mod: 25

## 2025-06-13 PROCEDURE — 69210 REMOVE IMPACTED EAR WAX UNI: CPT

## 2025-06-13 PROCEDURE — 99497 ADVNCD CARE PLAN 30 MIN: CPT

## 2025-06-13 RX ORDER — DICLOFENAC SODIUM 10 MG/G
1 GEL TOPICAL TWICE DAILY
Qty: 3 | Refills: 3 | Status: ACTIVE | COMMUNITY
Start: 2025-06-13 | End: 1900-01-01

## 2025-06-13 RX ORDER — POLYETHYLENE GLYCOL 3350 17 G/17G
17 POWDER, FOR SOLUTION ORAL DAILY
Qty: 1 | Refills: 5 | Status: ACTIVE | COMMUNITY
Start: 2025-06-13 | End: 1900-01-01

## 2025-06-15 PROBLEM — E87.0 HYPERNATREMIA: Status: ACTIVE | Noted: 2025-06-09

## 2025-06-15 PROBLEM — Z87.19 HISTORY OF GASTROINTESTINAL HEMORRHAGE: Status: RESOLVED | Noted: 2025-06-09 | Resolved: 2025-06-15

## 2025-06-17 ENCOUNTER — NON-APPOINTMENT (OUTPATIENT)
Age: 89
End: 2025-06-17

## 2025-06-17 RX ORDER — SENNOSIDES 8.6 MG/1
8.6 TABLET ORAL
Qty: 60 | Refills: 0 | Status: ACTIVE | COMMUNITY
Start: 2025-06-17 | End: 1900-01-01

## 2025-06-30 ENCOUNTER — TRANSCRIPTION ENCOUNTER (OUTPATIENT)
Age: 89
End: 2025-06-30

## 2025-07-01 ENCOUNTER — APPOINTMENT (OUTPATIENT)
Dept: HOME HEALTH SERVICES | Facility: HOME HEALTH | Age: 89
End: 2025-07-01

## 2025-07-07 ENCOUNTER — TRANSCRIPTION ENCOUNTER (OUTPATIENT)
Age: 89
End: 2025-07-07

## 2025-08-26 ENCOUNTER — APPOINTMENT (OUTPATIENT)
Dept: HOME HEALTH SERVICES | Facility: HOME HEALTH | Age: 89
End: 2025-08-26

## 2025-08-26 VITALS
SYSTOLIC BLOOD PRESSURE: 128 MMHG | OXYGEN SATURATION: 98 % | DIASTOLIC BLOOD PRESSURE: 68 MMHG | RESPIRATION RATE: 18 BRPM | TEMPERATURE: 98.3 F | HEART RATE: 82 BPM

## 2025-09-03 ENCOUNTER — NON-APPOINTMENT (OUTPATIENT)
Age: 89
End: 2025-09-03

## 2025-09-05 ENCOUNTER — APPOINTMENT (OUTPATIENT)
Dept: HOME HEALTH SERVICES | Facility: HOME HEALTH | Age: 89
End: 2025-09-05
Payer: MEDICARE

## 2025-09-05 DIAGNOSIS — H61.23 IMPACTED CERUMEN, BILATERAL: ICD-10-CM

## 2025-09-05 PROCEDURE — 99347 HOME/RES VST EST SF MDM 20: CPT

## 2025-09-08 ENCOUNTER — TRANSCRIPTION ENCOUNTER (OUTPATIENT)
Age: 89
End: 2025-09-08

## 2025-09-08 ENCOUNTER — EMERGENCY (EMERGENCY)
Facility: HOSPITAL | Age: 89
LOS: 1 days | End: 2025-09-08
Attending: EMERGENCY MEDICINE | Admitting: EMERGENCY MEDICINE
Payer: MEDICARE

## 2025-09-08 VITALS
DIASTOLIC BLOOD PRESSURE: 87 MMHG | TEMPERATURE: 99 F | WEIGHT: 89.95 LBS | SYSTOLIC BLOOD PRESSURE: 170 MMHG | OXYGEN SATURATION: 97 % | HEART RATE: 94 BPM | RESPIRATION RATE: 16 BRPM

## 2025-09-08 LAB
ALBUMIN SERPL ELPH-MCNC: 3.5 G/DL — SIGNIFICANT CHANGE UP (ref 3.3–5)
ALP SERPL-CCNC: 91 U/L — SIGNIFICANT CHANGE UP (ref 40–120)
ALT FLD-CCNC: 13 U/L — SIGNIFICANT CHANGE UP (ref 12–78)
ANION GAP SERPL CALC-SCNC: 4 MMOL/L — LOW (ref 5–17)
AST SERPL-CCNC: 22 U/L — SIGNIFICANT CHANGE UP (ref 15–37)
BASOPHILS # BLD AUTO: 0.02 K/UL — SIGNIFICANT CHANGE UP (ref 0–0.2)
BASOPHILS NFR BLD AUTO: 0.2 % — SIGNIFICANT CHANGE UP (ref 0–2)
BILIRUB SERPL-MCNC: 0.3 MG/DL — SIGNIFICANT CHANGE UP (ref 0.2–1.2)
BUN SERPL-MCNC: 19 MG/DL — SIGNIFICANT CHANGE UP (ref 7–23)
CALCIUM SERPL-MCNC: 9.9 MG/DL — SIGNIFICANT CHANGE UP (ref 8.5–10.1)
CHLORIDE SERPL-SCNC: 101 MMOL/L — SIGNIFICANT CHANGE UP (ref 96–108)
CO2 SERPL-SCNC: 37 MMOL/L — HIGH (ref 22–31)
CREAT SERPL-MCNC: 0.56 MG/DL — SIGNIFICANT CHANGE UP (ref 0.5–1.3)
EGFR: 87 ML/MIN/1.73M2 — SIGNIFICANT CHANGE UP
EGFR: 87 ML/MIN/1.73M2 — SIGNIFICANT CHANGE UP
EOSINOPHIL # BLD AUTO: 0 K/UL — SIGNIFICANT CHANGE UP (ref 0–0.5)
EOSINOPHIL NFR BLD AUTO: 0 % — SIGNIFICANT CHANGE UP (ref 0–6)
GLUCOSE SERPL-MCNC: 147 MG/DL — HIGH (ref 70–99)
HCT VFR BLD CALC: 38.3 % — SIGNIFICANT CHANGE UP (ref 34.5–45)
HGB BLD-MCNC: 12.1 G/DL — SIGNIFICANT CHANGE UP (ref 11.5–15.5)
IMM GRANULOCYTES # BLD AUTO: 0.04 K/UL — SIGNIFICANT CHANGE UP (ref 0–0.07)
IMM GRANULOCYTES NFR BLD AUTO: 0.4 % — SIGNIFICANT CHANGE UP (ref 0–0.9)
LACTATE SERPL-SCNC: 1.5 MMOL/L — SIGNIFICANT CHANGE UP (ref 0.7–2)
LIDOCAIN IGE QN: 26 U/L — SIGNIFICANT CHANGE UP (ref 13–75)
LYMPHOCYTES # BLD AUTO: 0.57 K/UL — LOW (ref 1–3.3)
LYMPHOCYTES NFR BLD AUTO: 5.3 % — LOW (ref 13–44)
MCHC RBC-ENTMCNC: 30 PG — SIGNIFICANT CHANGE UP (ref 27–34)
MCHC RBC-ENTMCNC: 31.6 G/DL — LOW (ref 32–36)
MCV RBC AUTO: 95 FL — SIGNIFICANT CHANGE UP (ref 80–100)
MONOCYTES # BLD AUTO: 0.24 K/UL — SIGNIFICANT CHANGE UP (ref 0–0.9)
MONOCYTES NFR BLD AUTO: 2.2 % — SIGNIFICANT CHANGE UP (ref 2–14)
NEUTROPHILS # BLD AUTO: 9.94 K/UL — HIGH (ref 1.8–7.4)
NEUTROPHILS NFR BLD AUTO: 91.9 % — HIGH (ref 43–77)
NRBC # BLD AUTO: 0 K/UL — SIGNIFICANT CHANGE UP (ref 0–0)
NRBC # FLD: 0 K/UL — SIGNIFICANT CHANGE UP (ref 0–0)
NRBC BLD AUTO-RTO: 0 /100 WBCS — SIGNIFICANT CHANGE UP (ref 0–0)
PLATELET # BLD AUTO: 333 K/UL — SIGNIFICANT CHANGE UP (ref 150–400)
PMV BLD: 9.3 FL — SIGNIFICANT CHANGE UP (ref 7–13)
POTASSIUM SERPL-MCNC: 3.9 MMOL/L — SIGNIFICANT CHANGE UP (ref 3.5–5.3)
POTASSIUM SERPL-SCNC: 3.9 MMOL/L — SIGNIFICANT CHANGE UP (ref 3.5–5.3)
PROT SERPL-MCNC: 7.8 G/DL — SIGNIFICANT CHANGE UP (ref 6–8.3)
RBC # BLD: 4.03 M/UL — SIGNIFICANT CHANGE UP (ref 3.8–5.2)
RBC # FLD: 15 % — HIGH (ref 10.3–14.5)
SODIUM SERPL-SCNC: 142 MMOL/L — SIGNIFICANT CHANGE UP (ref 135–145)
WBC # BLD: 10.81 K/UL — HIGH (ref 3.8–10.5)
WBC # FLD AUTO: 10.81 K/UL — HIGH (ref 3.8–10.5)

## 2025-09-08 PROCEDURE — 71045 X-RAY EXAM CHEST 1 VIEW: CPT | Mod: 26

## 2025-09-08 PROCEDURE — 74177 CT ABD & PELVIS W/CONTRAST: CPT

## 2025-09-08 PROCEDURE — 83690 ASSAY OF LIPASE: CPT

## 2025-09-08 PROCEDURE — 83605 ASSAY OF LACTIC ACID: CPT

## 2025-09-08 PROCEDURE — 85025 COMPLETE CBC W/AUTO DIFF WBC: CPT

## 2025-09-08 PROCEDURE — 36415 COLL VENOUS BLD VENIPUNCTURE: CPT

## 2025-09-08 PROCEDURE — 71045 X-RAY EXAM CHEST 1 VIEW: CPT

## 2025-09-08 PROCEDURE — 71260 CT THORAX DX C+: CPT

## 2025-09-08 PROCEDURE — 71260 CT THORAX DX C+: CPT | Mod: 26

## 2025-09-08 PROCEDURE — 74177 CT ABD & PELVIS W/CONTRAST: CPT | Mod: 26

## 2025-09-08 PROCEDURE — 99291 CRITICAL CARE FIRST HOUR: CPT

## 2025-09-08 PROCEDURE — 80053 COMPREHEN METABOLIC PANEL: CPT

## 2025-09-08 RX ORDER — ONDANSETRON HCL/PF 4 MG/2 ML
4 VIAL (ML) INJECTION ONCE
Refills: 0 | Status: COMPLETED | OUTPATIENT
Start: 2025-09-08 | End: 2025-09-08

## 2025-09-08 RX ADMIN — Medication 4 MILLIGRAM(S): at 17:49

## 2025-09-08 RX ADMIN — Medication 4 MILLIGRAM(S): at 19:20

## 2025-09-08 RX ADMIN — Medication 1000 MILLILITER(S): at 17:48

## 2025-09-09 ENCOUNTER — TRANSCRIPTION ENCOUNTER (OUTPATIENT)
Age: 89
End: 2025-09-09

## 2025-09-09 ENCOUNTER — NON-APPOINTMENT (OUTPATIENT)
Age: 89
End: 2025-09-09

## 2025-09-09 ENCOUNTER — RESULT REVIEW (OUTPATIENT)
Age: 89
End: 2025-09-09

## 2025-09-09 VITALS
HEART RATE: 103 BPM | SYSTOLIC BLOOD PRESSURE: 132 MMHG | OXYGEN SATURATION: 95 % | RESPIRATION RATE: 18 BRPM | DIASTOLIC BLOOD PRESSURE: 80 MMHG | TEMPERATURE: 98 F

## 2025-09-09 PROCEDURE — 96376 TX/PRO/DX INJ SAME DRUG ADON: CPT | Mod: XU

## 2025-09-09 PROCEDURE — 83605 ASSAY OF LACTIC ACID: CPT

## 2025-09-09 PROCEDURE — 71260 CT THORAX DX C+: CPT

## 2025-09-09 PROCEDURE — 83690 ASSAY OF LIPASE: CPT

## 2025-09-09 PROCEDURE — 43753 TX GASTRO INTUB W/ASP: CPT

## 2025-09-09 PROCEDURE — 85025 COMPLETE CBC W/AUTO DIFF WBC: CPT

## 2025-09-09 PROCEDURE — 96374 THER/PROPH/DIAG INJ IV PUSH: CPT | Mod: XU

## 2025-09-09 PROCEDURE — 74177 CT ABD & PELVIS W/CONTRAST: CPT

## 2025-09-09 PROCEDURE — 80053 COMPREHEN METABOLIC PANEL: CPT

## 2025-09-09 PROCEDURE — 99285 EMERGENCY DEPT VISIT HI MDM: CPT | Mod: 25

## 2025-09-09 PROCEDURE — 71045 X-RAY EXAM CHEST 1 VIEW: CPT

## 2025-09-09 PROCEDURE — 36415 COLL VENOUS BLD VENIPUNCTURE: CPT

## 2025-09-09 RX ADMIN — Medication 1000 MILLILITER(S): at 02:34

## 2025-09-12 ENCOUNTER — TRANSCRIPTION ENCOUNTER (OUTPATIENT)
Age: 89
End: 2025-09-12

## 2025-09-13 ENCOUNTER — TRANSCRIPTION ENCOUNTER (OUTPATIENT)
Age: 89
End: 2025-09-13

## 2025-09-18 ENCOUNTER — APPOINTMENT (OUTPATIENT)
Dept: HOME HEALTH SERVICES | Facility: HOME HEALTH | Age: 89
End: 2025-09-18

## 2025-09-19 ENCOUNTER — APPOINTMENT (OUTPATIENT)
Dept: HOME HEALTH SERVICES | Facility: HOME HEALTH | Age: 89
End: 2025-09-19
Payer: MEDICARE

## 2025-09-19 VITALS
RESPIRATION RATE: 18 BRPM | SYSTOLIC BLOOD PRESSURE: 132 MMHG | HEART RATE: 90 BPM | DIASTOLIC BLOOD PRESSURE: 78 MMHG | OXYGEN SATURATION: 98 % | TEMPERATURE: 98.4 F

## 2025-09-19 DIAGNOSIS — K56.2 VOLVULUS: ICD-10-CM

## 2025-09-19 PROCEDURE — 99495 TRANSJ CARE MGMT MOD F2F 14D: CPT

## 2025-09-22 PROBLEM — K56.2 VOLVULUS OF COLON: Status: ACTIVE | Noted: 2025-09-22

## 2025-09-27 PROBLEM — K31.89 VOLVULUS OF STOMACH: Status: ACTIVE | Noted: 2025-09-27

## 2025-09-27 PROBLEM — K92.2 UPPER GI BLEED: Status: ACTIVE | Noted: 2025-09-22

## 2025-09-27 PROBLEM — K22.6 MALLORY-WEISS TEAR: Status: ACTIVE | Noted: 2025-09-27
